# Patient Record
Sex: FEMALE | Race: BLACK OR AFRICAN AMERICAN | Employment: OTHER | URBAN - METROPOLITAN AREA
[De-identification: names, ages, dates, MRNs, and addresses within clinical notes are randomized per-mention and may not be internally consistent; named-entity substitution may affect disease eponyms.]

---

## 2019-01-07 ENCOUNTER — HOSPITAL ENCOUNTER (OUTPATIENT)
Dept: WOUND CARE | Age: 84
Discharge: HOME | End: 2019-01-07
Payer: MEDICARE

## 2019-01-07 VITALS — RESPIRATION RATE: 18 BRPM | OXYGEN SATURATION: 99 % | HEART RATE: 78 BPM | TEMPERATURE: 98.2 F

## 2019-01-07 PROCEDURE — 99201 HC NEW PT LEVEL I: CPT

## 2019-01-07 NOTE — WOUND CARE
01/07/19 1447 Wound Leg Lower Right;Lateral;Superior Date First Assessed/Time First Assessed: 01/07/19 1340   POA: Yes  Wound Type: Venous  Location: Leg Lower  Orientation: Right;Lateral;Superior DRESSING STATUS Other (comment) (home dressing) DRESSING TYPE Gauze wrap (sophie) Non-Pressure Injury Full thickness (subcut/muscle) Wound Length (cm) 2 cm Wound Width (cm) 2 cm Wound Depth (cm) 0.2 Wound Surface area (cm^2) 4 cm^2 Condition of Base Eschar;Slough Condition of Edges Open Tissue Type Percent Black 50 % Tissue Type Percent Yellow 50 Drainage Amount  Moderate Wound Odor Mild Periwound Skin Condition Erythema, blanchable Cleansing and Cleansing Agents  (foam wash, vashe) Dressing Type Applied (mesalt,mepitel one,4x4,sophie,softroll,coban) Procedure Tolerated Well Wound Leg Lower Lateral;Right; Lower Date First Assessed/Time First Assessed: 01/07/19 1000   POA: Yes  Wound Type: Venous  Location: Leg Lower  Orientation: Lateral;Right; Lower DRESSING STATUS Other (comment) (home dressing) DRESSING TYPE Gauze wrap (sophie) Non-Pressure Injury Full thickness (subcut/muscle) Wound Length (cm) 4.5 cm Wound Width (cm) 4.5 cm Wound Depth (cm) 0.2 Wound Surface area (cm^2) 20.25 cm^2 Condition of Base Eschar;Slough Condition of Edges Open Tissue Type Percent Black 50 % Tissue Type Percent Yellow 50 Drainage Amount  Moderate Drainage Color Serosanguinous Wound Odor Mild Periwound Skin Condition Erythema, blanchable Cleansing and Cleansing Agents  (foam wash,vashe) Dressing Type Applied (mesalt,mepitel one, 4x4,sophie,softroll,coban) Procedure Tolerated Well Wound Leg Lower Right; Anterior; Lower Date First Assessed/Time First Assessed: 01/07/19 1000   POA: Yes  Wound Type: Venous  Location: Leg Lower  Orientation: Right; Anterior; Lower DRESSING STATUS Other (comment) (home dressing) DRESSING TYPE Gauze wrap (sophie) Non-Pressure Injury Full thickness (subcut/muscle) Wound Length (cm) 2 cm Wound Width (cm) 2 cm Wound Depth (cm) 0.2 Wound Surface area (cm^2) 4 cm^2 Condition of Base Eschar;Slough Condition of Edges Open Tissue Type Percent Black 50 % Tissue Type Percent Yellow 50 Drainage Amount  Small Drainage Color Serosanguinous Wound Odor Mild Periwound Skin Condition Erythema, blanchable Cleansing and Cleansing Agents  (foam wash,vashe) Dressing Type Applied (mesalt,mepitel one,4x4,sophie,softroll,coban) Procedure Tolerated Well

## 2019-01-15 ENCOUNTER — HOSPITAL ENCOUNTER (OUTPATIENT)
Dept: VASCULAR SURGERY | Age: 84
Discharge: HOME | End: 2019-01-15
Payer: MEDICARE

## 2019-01-15 ENCOUNTER — HOSPITAL ENCOUNTER (OUTPATIENT)
Dept: WOUND CARE | Age: 84
Discharge: HOME | End: 2019-01-15
Payer: MEDICARE

## 2019-01-15 VITALS
OXYGEN SATURATION: 99 % | HEART RATE: 100 BPM | DIASTOLIC BLOOD PRESSURE: 109 MMHG | RESPIRATION RATE: 17 BRPM | SYSTOLIC BLOOD PRESSURE: 156 MMHG | TEMPERATURE: 97.6 F

## 2019-01-15 DIAGNOSIS — L97.812 ULCER OF RIGHT PRETIBIAL REGION, WITH FAT LAYER EXPOSED (HCC): ICD-10-CM

## 2019-01-15 LAB
LEFT ABI: 0.7
LEFT ANTERIOR TIBIAL: 138 MMHG
LEFT ARM BP: 198 MMHG
LEFT POSTERIOR TIBIAL: 122 MMHG
LEFT TBI: 0.36
LEFT TOE PRESSURE: 72 MMHG
RIGHT ABI: 1.05
RIGHT ANTERIOR TIBIAL: 208 MMHG
RIGHT ARM BP: 189 MMHG
RIGHT POSTERIOR TIBIAL: 208 MMHG
RIGHT TBI: 0.55
RIGHT TOE PRESSURE: 109 MMHG

## 2019-01-15 PROCEDURE — 99211 OFF/OP EST MAY X REQ PHY/QHP: CPT

## 2019-01-15 PROCEDURE — 93922 UPR/L XTREMITY ART 2 LEVELS: CPT

## 2019-01-15 NOTE — WOUND CARE
01/15/19 1100 Wound Leg Lower Right;Lateral;Superior Date First Assessed/Time First Assessed: 01/07/19 1340   POA: Yes  Wound Type: Venous  Location: Leg Lower  Orientation: Right;Lateral;Superior DRESSING STATUS Other (comment) 
(slipped down) DRESSING TYPE Other (Comment) (coban, soft roll, gauze, mepitel, mesalt) Non-Pressure Injury Full thickness (subcut/muscle) Wound Length (cm) 2 cm Wound Width (cm) 2 cm Wound Depth (cm) 0.2 Wound Surface area (cm^2) 4 cm^2 Change in Wound Size % 0 Condition of Base Slough;Granulation Condition of Edges Open Tissue Type Percent Red 25 Tissue Type Percent Yellow 75 Drainage Amount  Moderate Drainage Color Serosanguinous Wound Odor None Periwound Skin Condition Erythema, blanchable Cleansing and Cleansing Agents  Other (comment) (vashe) Dressing Type Applied Other (Comment) (silcone border, mesalt, tubigrip F) Procedure Tolerated Well Wound Leg Lower Lateral;Right; Lower Date First Assessed/Time First Assessed: 01/07/19 1000   POA: Yes  Wound Type: Venous  Location: Leg Lower  Orientation: Lateral;Right; Lower DRESSING STATUS Other (comment) 
(slipped down) DRESSING TYPE Other (Comment) (coban, soft roll, gauze, mepitel one, mesalt) Non-Pressure Injury Full thickness (subcut/muscle) Wound Length (cm) 4.5 cm Wound Width (cm) 5 cm Wound Depth (cm) 0.2 Wound Surface area (cm^2) 22.5 cm^2 Change in Wound Size % -11.11 Condition of Base Slough;Granulation Condition of Edges Open Tissue Type Percent Red 25 Tissue Type Percent Yellow 75 Drainage Amount  Moderate Drainage Color Serosanguinous Wound Odor None Periwound Skin Condition Erythema, blanchable Cleansing and Cleansing Agents  Other (comment) (vashe) Dressing Type Applied Other (Comment) (silcone border, mesalt, tubigrip f ) Procedure Tolerated Well Wound Leg Lower Right; Anterior; Lower Date First Assessed/Time First Assessed: 01/07/19 1000   POA: Yes  Wound Type: Venous  Location: Leg Lower  Orientation: Right; Anterior; Lower DRESSING STATUS Other (comment) 
(slipped down) DRESSING TYPE Other (Comment) (coban, soft roll, gauze, mepitel one, mesalt ) Non-Pressure Injury Full thickness (subcut/muscle) Wound Length (cm) 2.5 cm Wound Width (cm) 2.5 cm Wound Depth (cm) 0.2 Wound Surface area (cm^2) 6.25 cm^2 Change in Wound Size % -56.25 Condition of Base Slough;Granulation Condition of Edges Open Tissue Type Percent Red 25 Tissue Type Percent Yellow 75 Drainage Amount  Moderate Drainage Color Serosanguinous Wound Odor None Periwound Skin Condition Erythema, blanchable Cleansing and Cleansing Agents  Other (comment) (vashe) Dressing Type Applied Other (Comment) (silcone border, mesalt, tubigrip F) Procedure Tolerated Well Superior  
 
lateral 
 
Anterior

## 2019-01-15 NOTE — DISCHARGE INSTRUCTIONS
May shower, keep dressing dry and covered   Leave dressings in place until next visit    PLEASE CONTACT OFFICE AS SOON AS POSSIBLE IF UNABLE TO [address]. Inspect your wounds, looking for signs of infection which may include the following:  Increase in redness  Red \"streaks\" from wound  Increase in swelling  Fever  Unusual odor  Change in the amount of wound drainage. Should you experience any significant changes in your wound(s) or have any questions regarding your home care instructions please contact the wound center or your home health company. If after regular business hours, please call your family doctor or local emergency room. Edema Control:   Elevate legs as much as possible. Avoid standing in one position for more than 10 minutes. Avoid setting with legs down. Do not cross legs while sitting. Off-Loading:     Frequent position changes. Do not cross legs while sitting. Shift weight every 20 minutes or more when sitting for prolonged periods of time.

## 2019-01-18 ENCOUNTER — HOSPITAL ENCOUNTER (OUTPATIENT)
Dept: WOUND CARE | Age: 84
Discharge: HOME | End: 2019-01-18
Payer: MEDICARE

## 2019-01-18 VITALS
TEMPERATURE: 97.7 F | RESPIRATION RATE: 18 BRPM | DIASTOLIC BLOOD PRESSURE: 94 MMHG | OXYGEN SATURATION: 100 % | SYSTOLIC BLOOD PRESSURE: 147 MMHG | HEART RATE: 103 BPM

## 2019-01-18 PROCEDURE — 97597 DBRDMT OPN WND 1ST 20 CM/<: CPT

## 2019-01-18 PROCEDURE — 29581 APPL MULTLAYER CMPRN SYS LEG: CPT

## 2019-01-18 NOTE — DISCHARGE INSTRUCTIONS
Patient to return for wound care in: Wednesday    PLEASE CONTACT OFFICE AS SOON AS POSSIBLE IF UNABLE TO MAKE THIS APPOINTMENT. Inspect your wounds, looking for signs of infection which may include the following:  Increase in redness  Red \"streaks\" from wound  Increase in swelling  Fever  Unusual odor  Change in the amount of wound drainage. Should you experience any significant changes in your wound(s) or have any questions regarding your home care instructions please contact the wound center or your home health company. If after regular business hours, please call your family doctor or local emergency room. Edema Control:   Elevate legs as much as possible. Avoid standing in one position for more than 10 minutes. Avoid setting with legs down. Do not cross legs while sitting. Off-Loading:     Frequent position changes. Do not cross legs while sitting. Shift weight every 20 minutes or more when sitting for prolonged periods of time.

## 2019-01-18 NOTE — WOUND CARE
01/18/19 1618 Wound Leg Lower Right;Lateral;Superior Date First Assessed/Time First Assessed: 01/07/19 1340   POA: Yes  Wound Type: Venous  Location: Leg Lower  Orientation: Right;Lateral;Superior DRESSING STATUS Intact DRESSING TYPE Tubular dressing 
(tubigrip, allevyn, mesalt ) Non-Pressure Injury Full thickness (subcut/muscle) Wound Length (cm) 2 cm Wound Width (cm) 2 cm Wound Depth (cm) 0.2 Wound Surface area (cm^2) 4 cm^2 Change in Wound Size % 0 Condition of Base Slough;Granulation Condition of Edges Open Tissue Type Percent Red 25 Tissue Type Percent Yellow 75 Drainage Amount  Moderate Drainage Color Serosanguinous Wound Odor None Periwound Skin Condition Erythema, blanchable Cleansing and Cleansing Agents  Other (comment) (vashe) Dressing Type Applied Other (Comment) (profore w/o coban, mextra, sophie, mep 1, mesalt) Procedure Tolerated Well Wound Leg Lower Lateral;Right; Lower Date First Assessed/Time First Assessed: 01/07/19 1000   POA: Yes  Wound Type: Venous  Location: Leg Lower  Orientation: Lateral;Right; Lower DRESSING STATUS Clean, dry, and intact DRESSING TYPE Tubular dressing 
(Trent Lei) Non-Pressure Injury Full thickness (subcut/muscle) Wound Length (cm) 4.5 cm Wound Width (cm) 4.5 cm Wound Depth (cm) 0.2 Wound Surface area (cm^2) 20.25 cm^2 Change in Wound Size % 0 Condition of Base Slough;Granulation Condition of Edges Open Tissue Type Percent Red 25 Tissue Type Percent Yellow 75 Drainage Amount  Moderate Drainage Color Serosanguinous Wound Odor None Periwound Skin Condition Erythema, blanchable Cleansing and Cleansing Agents  Other (comment) (vashe) Dressing Type Applied Other (Comment) (profore w/o coban, mextra, sophie, mep 1, mesalt) Procedure Tolerated Well Wound Leg Lower Right; Anterior; Lower Date First Assessed/Time First Assessed: 01/07/19 1000   POA: Yes  Wound Type: Venous  Location: Leg Lower  Orientation: Right; Anterior; Lower DRESSING STATUS Clean, dry, and intact DRESSING TYPE Tubular dressing 
(Cheryle Richer) Non-Pressure Injury Full thickness (subcut/muscle) Wound Length (cm) 2.5 cm Wound Width (cm) 2.5 cm Wound Depth (cm) 0.2 Wound Surface area (cm^2) 6.25 cm^2 Change in Wound Size % -56.25 Condition of Base Slough;Granulation Condition of Edges Open Tissue Type Percent Red 25 Tissue Type Percent Yellow 75 Drainage Amount  Moderate Drainage Color Serosanguinous Wound Odor None Periwound Skin Condition Erythema, blanchable Cleansing and Cleansing Agents  Other (comment) (vashe) Dressing Type Applied Other (Comment) (profore w/o coban, rafiq, sophie, mep 1, mesalt) Procedure Tolerated Well

## 2019-01-23 ENCOUNTER — HOSPITAL ENCOUNTER (OUTPATIENT)
Dept: WOUND CARE | Age: 84
Discharge: HOME | End: 2019-01-23
Payer: MEDICARE

## 2019-01-23 VITALS
RESPIRATION RATE: 18 BRPM | HEART RATE: 70 BPM | TEMPERATURE: 98.1 F | OXYGEN SATURATION: 98 % | DIASTOLIC BLOOD PRESSURE: 71 MMHG | SYSTOLIC BLOOD PRESSURE: 144 MMHG

## 2019-01-23 PROCEDURE — 29581 APPL MULTLAYER CMPRN SYS LEG: CPT

## 2019-01-25 NOTE — WOUND CARE
01/23/19 1416 Wound Leg Lower Right;Lateral;Superior Date First Assessed/Time First Assessed: 01/07/19 1340   POA: Yes  Wound Type: Venous  Location: Leg Lower  Orientation: Right;Lateral;Superior DRESSING STATUS Clean, dry, and intact DRESSING TYPE (profore no coban, ace,mesalt,4x4,mextra,sophie) Non-Pressure Injury Full thickness (subcut/muscle) Wound Length (cm) 2 cm Wound Width (cm) 2 cm Wound Depth (cm) 0.2 Wound Surface area (cm^2) 4 cm^2 Change in Wound Size % 0 Condition of Base Slough;Granulation Condition of Edges Open Tissue Type Percent Red 25 Tissue Type Percent Yellow 75 Drainage Amount  Moderate Drainage Color Serosanguinous Wound Odor None Periwound Skin Condition Edematous; Erythema, blanchable Cleansing and Cleansing Agents  (vashe) Dressing Type Applied (profore no coban,ace,mextra,sophie,mesalt) Procedure Tolerated Well Wound Leg Lower Lateral;Right; Lower Date First Assessed/Time First Assessed: 01/07/19 1000   POA: Yes  Wound Type: Venous  Location: Leg Lower  Orientation: Lateral;Right; Lower DRESSING STATUS Clean, dry, and intact DRESSING TYPE (profore no coban,sophie,mextra,mesalt) Non-Pressure Injury Full thickness (subcut/muscle) Wound Length (cm) 4.4 cm Wound Width (cm) 4.5 cm Wound Depth (cm) 0.2 Wound Surface area (cm^2) 19.8 cm^2 Change in Wound Size % 2.22 Condition of Base Slough;Granulation Condition of Edges Open Tissue Type Percent Red 25 Tissue Type Percent Yellow 75 Drainage Amount  Moderate Wound Odor None Periwound Skin Condition Erythema, blanchable Cleansing and Cleansing Agents  (foam wash, vashe) Dressing Type Applied (profore no coban,ace,sophie,mextra,4x4,mesalt) Wound Leg Lower Right; Anterior; Lower Date First Assessed/Time First Assessed: 01/07/19 1000   POA: Yes  Wound Type: Venous  Location: Leg Lower  Orientation: Right; Anterior; Lower DRESSING STATUS Clean, dry, and intact Non-Pressure Injury Full thickness (subcut/muscle) Wound Length (cm) 2.5 cm Wound Width (cm) 2.5 cm Wound Depth (cm) 0.2 Wound Surface area (cm^2) 6.25 cm^2 Change in Wound Size % -56.25 Condition of Base Slough;Granulation Condition of Edges Open Tissue Type Percent Red 25 Tissue Type Percent Yellow 75 Drainage Amount  Moderate Drainage Color Serosanguinous Wound Odor None Periwound Skin Condition Erythema, blanchable Cleansing and Cleansing Agents  (profore no coban,ace,sophie,mextra,4x4,mesalt) Dressing Type Applied (vashe,foam wash)

## 2019-01-29 ENCOUNTER — HOSPITAL ENCOUNTER (OUTPATIENT)
Dept: WOUND CARE | Age: 84
Discharge: HOME | End: 2019-01-29
Payer: MEDICARE

## 2019-01-29 VITALS
TEMPERATURE: 97.4 F | RESPIRATION RATE: 18 BRPM | DIASTOLIC BLOOD PRESSURE: 94 MMHG | OXYGEN SATURATION: 98 % | HEART RATE: 70 BPM | SYSTOLIC BLOOD PRESSURE: 167 MMHG

## 2019-01-29 PROCEDURE — 29581 APPL MULTLAYER CMPRN SYS LEG: CPT

## 2019-01-30 NOTE — WOUND CARE
01/29/19 1203 Wound Leg Lower Right;Lateral;Superior Date First Assessed/Time First Assessed: 01/07/19 1340   POA: Yes  Wound Type: Venous  Location: Leg Lower  Orientation: Right;Lateral;Superior Dressing Status  Clean, dry, and intact Dressing Type  (ace wraps, multilayer wrap) Non-Pressure Injury Full thickness (subcut/muscle) Wound Length (cm) 7.3 cm Wound Width (cm) 4 cm Wound Depth (cm) 0.3 Wound Surface area (cm^2) 29.2 cm^2 Change in Wound Size % -630 Condition of Base Granulation;Slough Condition of Edges Open Tissue Type Percent Red 40 Tissue Type Percent Yellow 60 Drainage Amount  Moderate Drainage Color Serosanguinous Wound Odor None Periwound Skin Condition Edematous; Erythema, blanchable Cleansing and Cleansing Agents  (foam wash, vashe and barrier wipes) Dressing Type Applied (mesalt, gauze, mextra, profore wrap, no coban) Procedure Tolerated Well Wound Leg Lower Lateral;Right; Lower Date First Assessed/Time First Assessed: 01/07/19 1000   POA: Yes  Wound Type: Venous  Location: Leg Lower  Orientation: Lateral;Right; Lower Dressing Status  Clean, dry, and intact Dressing Type  (ace wraps, multilayer wrap) Splint Type/Material (wounds have merged) Wound Leg Lower Right; Anterior; Lower Date First Assessed/Time First Assessed: 01/07/19 1000   POA: Yes  Wound Type: Venous  Location: Leg Lower  Orientation: Right; Anterior; Lower Dressing Status  Clean, dry, and intact Non-Pressure Injury Full thickness (subcut/muscle) Wound Length (cm) 2.3 cm Wound Width (cm) 2 cm Wound Depth (cm) 0.2 Wound Surface area (cm^2) 4.6 cm^2 Change in Wound Size % -15 Condition of Base Granulation;Slough Condition of Edges Open Tissue Type Percent Red 30 Tissue Type Percent Yellow 70 Drainage Amount  Moderate Drainage Color Serosanguinous Wound Odor None Periwound Skin Condition Erythema, blanchable Cleansing and Cleansing Agents  (foam wash, vashe and barrier wipes) Dressing Type Applied (mesalt, gauze, mextra, profore wrap, no coban) Procedure Tolerated Well

## 2019-02-01 ENCOUNTER — HOSPITAL ENCOUNTER (OUTPATIENT)
Dept: WOUND CARE | Age: 84
Discharge: HOME | End: 2019-02-01
Attending: PODIATRIST
Payer: MEDICARE

## 2019-02-01 VITALS
DIASTOLIC BLOOD PRESSURE: 121 MMHG | RESPIRATION RATE: 18 BRPM | SYSTOLIC BLOOD PRESSURE: 193 MMHG | OXYGEN SATURATION: 100 % | TEMPERATURE: 98 F | HEART RATE: 110 BPM

## 2019-02-01 PROBLEM — L97.812 ULCER OF RIGHT PRETIBIAL REGION, WITH FAT LAYER EXPOSED (HCC): Status: ACTIVE | Noted: 2019-02-01

## 2019-02-01 PROCEDURE — 15271 SKIN SUB GRAFT TRNK/ARM/LEG: CPT

## 2019-02-01 PROCEDURE — 15272 SKIN SUB GRAFT T/A/L ADD-ON: CPT

## 2019-02-01 NOTE — WOUND CARE
02/01/19 1553 Wound Leg Lower Right;Lateral;Superior Date First Assessed/Time First Assessed: 01/07/19 1340   POA: Yes  Wound Type: Venous  Location: Leg Lower  Orientation: Right;Lateral;Superior Dressing Status  Intact; Old drainage Dressing Type  4 x 4;Elastic bandage Non-Pressure Injury Full thickness (subcut/muscle) Wound Length (cm) 7.3 cm Wound Width (cm) 4 cm Wound Depth (cm) 0.3 Wound Surface area (cm^2) 29.2 cm^2 Change in Wound Size % -630 Condition of Base Granulation;Slough Condition of Edges Open Tissue Type Percent Red 40 Tissue Type Percent Yellow 60 Drainage Amount  Moderate Drainage Color Serosanguinous Wound Odor None Periwound Skin Condition Erythema, blanchable Cleansing and Cleansing Agents  Dermal wound cleanser Dressing Type Applied Compression dressing; Other (Comment) (Mesalt, exudry, soft roll, Profore compression wrap.) Wound Procedure Type Skin Substitue Consent Obtained  Yes Procedure Instrument  Blade Debridement Procedure Performed by Sherri Francisco) Post-Procedure Length (cm) 7.3 cm Post-Procedure Width (cm) 4 cm Post-Procedure Depth (cm) 0.3 cm Post-Procedure Volume (cm^3) 8.76 cm^3 Post-Procedure Surface Area (cm^2) 29.2 cm^2 Skin Substitute Applied  Theraskin Skin Sub Application  3/10 Post Procedure Procedure Pain Scale Numeric 1/10 Assisted Physcian in procedure  Yes Procedure Tolerated Well Wound Leg Lower Lateral;Right; Lower Date First Assessed/Time First Assessed: 01/07/19 1000   POA: Yes  Wound Type: Venous  Location: Leg Lower  Orientation: Lateral;Right; Lower Dressing Status  Intact; Old drainage Dressing Type  (ace wraps multilayer wraps) Wound Leg Lower Right; Anterior; Lower Date First Assessed/Time First Assessed: 01/07/19 1000   POA: Yes  Wound Type: Venous  Location: Leg Lower  Orientation: Right; Anterior; Lower Dressing Status  Intact; Old drainage Non-Pressure Injury Full thickness (subcut/muscle) Wound Length (cm) 2.3 cm Wound Width (cm) 2 cm Wound Depth (cm) 0.2 Wound Surface area (cm^2) 4.6 cm^2 Change in Wound Size % -15 Condition of Base Granulation;Slough Condition of Edges Open Tissue Type Percent Red 30 Tissue Type Percent Yellow 70 Drainage Amount  Small Drainage Color Sanguinous Wound Odor None Periwound Skin Condition Erythema, blanchable Cleansing and Cleansing Agents  Dermal wound cleanser Dressing Type Applied Pressure dressing Theraskin:  Applied to right lower leg Lot# [id number] Exp: 2022-04-03 Normal Saline 0.9% Lot# [id number]  EXP: [id number].

## 2019-02-01 NOTE — PROGRESS NOTES
[address] [address] Subjective: Chief Complaint: Maylin Bolden is a 80 y.o. female who returns to [facility] today, for follow up treatment of open wound on the right leg. It started over a month ago and has continued to get bigger, despite proper wound care. She states that she would like to try a skin graft today. Past Medical History:  
Diagnosis Date  Menopause Past Surgical History:  
Procedure Laterality Date  HX BREAST BIOPSY Left br. Benign  HX HYSTERECTOMY Age 52 Current Medications: 
Prior to Admission medications Not on File Allergies Allergen Reactions  Petrolatum,White Rash Family History Problem Relation Age of Onset  Breast Cancer Maternal Aunt  Breast Cancer Maternal Grandmother Social History Socioeconomic History  Marital status:  Spouse name: Not on file  Number of children: Not on file  Years of education: Not on file  Highest education level: Not on file Social Needs  Financial resource strain: Not on file  Food insecurity - worry: Not on file  Food insecurity - inability: Not on file  Transportation needs - medical: Not on file  Transportation needs - non-medical: Not on file Occupational History  Not on file Tobacco Use  Smoking status: Not on file Substance and Sexual Activity  Alcohol use: Not on file  Drug use: Not on file  Sexual activity: Not on file Other Topics Concern [address] Service Not Asked  Blood Transfusions Not Asked  Caffeine Concern Not Asked  Occupational Exposure Not Asked Nancy Bui Hazards Not Asked  Sleep Concern Not Asked  Stress Concern Not Asked  Weight Concern Not Asked  Special Diet Not Asked  Back Care Not Asked  Exercise Not Asked  Bike Helmet Not Asked  Seat Belt Not Asked  Self-Exams Not Asked Social History Narrative  Not on file Objective:  
 
Physical Assessment: 
 
Vitals:  
 02/01/19 1545 BP: (!) 193/121 Pulse: (!) 110 Resp: 18 Temp: 98 °F (36.7 °C) SpO2: 100% Lower Extremity Exam: 
 
Vascular Exam: 
Dorsalis Pedis Pulse: 1/4  Bilateral Lower Extremities Posterior Tibial Pulse: 1/4 Bilateral Lower Extremities Capillary Refill is < 3 seconds Bilateral Lower Extremities Temperature of extremity from proximal to distal is warm and perfused Bilateral Lower Extremities Recent LYNNETTE results: Right=1.05       Left=0.7 on 1- Integumentary Exam: 
Skin Texture is WNL Bilateral Lower Extremities Pedal Hair is present Bilateral Lower Extremities Examination of lower extremity reveals open ulcers right leg Etiology: Started as a blister Wound Description: Wound Type: Venous stasis Indication:    Chronic >30days Status:   no change Measurements: 
  Wound Length: 7.3 cm, 2.3 cm Wound Width :4 cm, 2 cm Wound Depth :0.3, 0.2 Tissue Type: Ulcer bed: Red granular base Periwound: Surrounding erythema with intact dermal layer Exudate: No drainage noted Epithelial: with out necrosis Granulation: with out necrosis Subcutaneous: with out necrosis Slough: no 
Eschar: no 
Tendon: exposed no -with out necrosis Fascia: exposed no -with out necrosis Muscle: exposed no -with out necrosis Bone: exposed no -with out necrosis Drainage:  None Debridement:   --not required Infection: no 
Edema: no  
 
Compression: no  
Offloading:  No 
Nicotine/Tobacco Use:  No  
 
Nutrition: 
Status malnourished  WNL Laboratory Results: 
@ Basic Metabolic Profile@ Lab Results Component Value Date  09/15/2011 CO2 32 09/15/2011 BUN 17 09/15/2011 Data Review: No results found for this or any previous visit (from the past 24 hour(s)). Assessment:  
 
80 y.o. female with Patient Active Problem List  
Diagnosis Code  Ulcer of right pretibial region, with fat layer exposed ([facility] [address] H04.284 Open venous stasis ulcers right leg Venous insufficiency bilateral lower extremities. Recommendation:  
 
Plan:  
 
Plan/Procedure: 
Needs : 
 
Initial application: Bioengineered skin substitute product Theraskin was applied today on the Right leg. This is a chronic venous ulcer, that has not responded to standard wound treatment. The wound has not changed or has increased in size/depth, and has no indication of improvement towards healing. The following interventions have failed: More than 4 weeks of proper wound care consisting of debridement, compression, specialized wound care products. The patient has adequate circulation to support tissue growth and wound healing as evidence by Recent LYNNETTE results: Right=1.05       Left=0.7 on 1-. Patient is also compliant with the following areas: 
 
Smoking: patient has never smoked Edema control with compression, offloading appropriately, and the wound is free from infection or underlying osteomyelitis. The wound has been receiving appropriate debridement prior to application of bioengineered skin substitute. Time Taken out: yes Location: Right leg Wound assessment: as noted above Application area: 39 -sq cm Product applied:  39 -sq cm of total product applied Wasted: 0 -sq cm of any unused product Lot #: refer to nursing input information Order #:  refer to nursing input information Expiration date:  refer to nursing input information Was the product fenestrated?: yes The product was secured with steri-strips, sutures, and covered with a dressing. Pre procedural pain was 4/10 rating, and post procedural pain was 5/10 rating). Patient responded well to treatment. Good local wound care Nutrition optimization In wound care clinic today: 
Cleanse wound with commercial wound cleanser Apply the following topically to wound bed: Mepatel-1 Apply the following to juancho-wound: NA Apply the following dressings: Homero  Profore compressive dressing For Home Care/Self Care: 
Cleanse wound with NS or soap and water or commercial wound cleanser Keep dressing dry and intact when bathing Apply the same dressings as above. Leave dressings in place until next visit Patient to return for wound care in: 7  Days Follow up with Nurse visit as recommended. PLEASE CONTACT OFFICE AS SOON AS POSSIBLE IF UNABLE TO MAKE THIS APPOINTMENT. Inspect your wounds, looking for signs of infection which may include the following:  Increase in redness  Red \"streaks\" from wound  Increase in swelling Fever  Unusual odor Change in the amount of wound drainage. Should you experience any significant changes in your wound(s) or have any questions regarding your home care instructions please contact the wound center or your home health company. If after regular business hours, please call your family doctor or local emergency room. Edema Control:   Elevate legs as much as possible. Avoid standing in one position for more than 10 minutes. Avoid setting with legs down. Do not cross legs while sitting. Off-Loading:     Frequent position changes. Do not cross legs while sitting. Shift weight every 20 minutes or more when sitting for prolonged periods of time.  
 
 
Signed By: Cleveland Bolden DPM 
  
 February 1, 2019, 6:07 PM

## 2019-02-05 ENCOUNTER — HOSPITAL ENCOUNTER (OUTPATIENT)
Dept: WOUND CARE | Age: 84
Discharge: HOME | End: 2019-02-05
Attending: PODIATRIST
Payer: MEDICARE

## 2019-02-05 VITALS
TEMPERATURE: 97.6 F | SYSTOLIC BLOOD PRESSURE: 183 MMHG | OXYGEN SATURATION: 100 % | DIASTOLIC BLOOD PRESSURE: 116 MMHG | HEART RATE: 97 BPM | RESPIRATION RATE: 18 BRPM

## 2019-02-05 PROCEDURE — 29581 APPL MULTLAYER CMPRN SYS LEG: CPT

## 2019-02-05 NOTE — WOUND CARE
02/05/19 1404 Wound Leg Lower Right;Lateral;Superior Date First Assessed/Time First Assessed: 01/07/19 1340   POA: Yes  Wound Type: Venous  Location: Leg Lower  Orientation: Right;Lateral;Superior Dressing Status  Clean, dry, and intact Dressing Type  (sophie, mextra, profore wrap(no coban) ace wrap) Non-Pressure Injury Full thickness (subcut/muscle) Wound Length (cm) 7.3 cm Wound Width (cm) 4 cm Wound Depth (cm) 0.3 Wound Surface area (cm^2) 29.2 cm^2 Change in Wound Size % -630 Condition of Base (skin graft intact) Condition of Edges Open Tissue Type Percent Red (skin graft intact) Drainage Amount  Small Drainage Color Serous Wound Odor None Periwound Skin Condition Intact Cleansing and Cleansing Agents  (foam wash, saline and barrier wipes) Dressing Type Applied (mextra, sophie, profore wrap (no coban) ace wrap) Procedure Tolerated Well Wound Leg Lower Lateral;Right; Lower Date First Assessed/Time First Assessed: 01/07/19 1000   POA: Yes  Wound Type: Venous  Location: Leg Lower  Orientation: Lateral;Right; Lower Dressing Status  Clean, dry, and intact Dressing Type  (sophie, mextra, profore wrap(no coban) ace wrap) Wound Leg Lower Right; Anterior; Lower Date First Assessed/Time First Assessed: 01/07/19 1000   POA: Yes  Wound Type: Venous  Location: Leg Lower  Orientation: Right; Anterior; Lower Dressing Status  Clean, dry, and intact Dressing Type  (sophie, mextra, profore wrap(no coban) ace wrap) Non-Pressure Injury Full thickness (subcut/muscle) Wound Length (cm) 2.3 cm Wound Width (cm) 2 cm Wound Depth (cm) 0.2 Wound Surface area (cm^2) 4.6 cm^2 Change in Wound Size % -15 Condition of Base (skin graft) Condition of Edges Open Tissue Type Percent Red (skin graft) Drainage Amount  Small Drainage Color Serous Wound Odor None Periwound Skin Condition Intact Cleansing and Cleansing Agents  (foam wash, saline and barrier wipes) Dressing Type Applied (mextra. sophie, profore wrap ( no coban) ace wrap) Procedure Tolerated Well

## 2019-02-11 ENCOUNTER — HOSPITAL ENCOUNTER (OUTPATIENT)
Dept: WOUND CARE | Age: 84
Discharge: HOME | End: 2019-02-11
Attending: PODIATRIST
Payer: MEDICARE

## 2019-02-11 VITALS
OXYGEN SATURATION: 100 % | SYSTOLIC BLOOD PRESSURE: 175 MMHG | TEMPERATURE: 97.5 F | DIASTOLIC BLOOD PRESSURE: 112 MMHG | HEART RATE: 93 BPM | RESPIRATION RATE: 17 BRPM

## 2019-02-11 PROCEDURE — 29581 APPL MULTLAYER CMPRN SYS LEG: CPT

## 2019-02-11 NOTE — DISCHARGE INSTRUCTIONS
For Home Care/Self Care  Keep dressing dry and intact when bathing    Leave dressings in place until next visit    [facility]. Inspect your wounds, looking for signs of infection which may include the following:  Increase in redness  Red \"streaks\" from wound  Increase in swelling  Fever  Unusual odor  Change in the amount of wound drainage. Should you experience any significant changes in your wound(s) or have any questions regarding your home care instructions please contact the wound center or your home health company. If after regular business hours, please call your family doctor or local emergency room. Edema Control:   Elevate legs as much as possible. Avoid standing in one position for more than 10 minutes. Avoid setting with legs down. Do not cross legs while sitting. Off-Loading:     Frequent position changes. Do not cross legs while sitting. Shift weight every 20 minutes or more when sitting for prolonged periods of time.

## 2019-02-11 NOTE — WOUND CARE
02/11/19 1134 Wound Leg Lower Right;Lateral;Superior Date First Assessed/Time First Assessed: 01/07/19 1340   POA: Yes  Wound Type: Venous  Location: Leg Lower  Orientation: Right;Lateral;Superior Dressing Status  Clean, dry, and intact Dressing Type  Absorptive; Compression dressing Non-Pressure Injury Full thickness (subcut/muscle) Wound Length (cm) (Graft in place) Condition of Base Pink;Slough Condition of Edges Open Tissue Type Yellow;Pink Drainage Amount  Moderate Drainage Color Serosanguinous; Tan  
Wound Odor Mild Periwound Skin Condition Intact; Other (comment) (dry) Cleansing and Cleansing Agents  Soap and water;Normal saline Dressing Type Applied Other (Comment) (pita Starks with ACE wrap ) Procedure Tolerated Well

## 2019-02-15 ENCOUNTER — HOSPITAL ENCOUNTER (OUTPATIENT)
Dept: WOUND CARE | Age: 84
Discharge: HOME | End: 2019-02-15
Attending: PODIATRIST
Payer: MEDICARE

## 2019-02-15 VITALS
TEMPERATURE: 97.5 F | OXYGEN SATURATION: 100 % | HEART RATE: 101 BPM | SYSTOLIC BLOOD PRESSURE: 187 MMHG | RESPIRATION RATE: 18 BRPM | DIASTOLIC BLOOD PRESSURE: 96 MMHG

## 2019-02-15 PROCEDURE — 29581 APPL MULTLAYER CMPRN SYS LEG: CPT

## 2019-02-15 NOTE — WOUND CARE
02/15/19 1332 Wound Leg Lower Right;Lateral;Superior Date First Assessed/Time First Assessed: 01/07/19 1340   POA: Yes  Wound Type: Venous  Location: Leg Lower  Orientation: Right;Lateral;Superior Dressing Status  Clean, dry, and intact Dressing Type  Absorptive Non-Pressure Injury Full thickness (subcut/muscle) Condition of Base Pink;Slough Condition of Edges Open Tissue Type Pink;Yellow Drainage Amount  Moderate Drainage Color Serosanguinous; Tan  
Wound Odor Mild Periwound Skin Condition Intact; Other (comment) Cleansing and Cleansing Agents  Soap and water Dressing Type Applied Pressure dressing; Other (Comment) 
(sorbion sachet, sophie, coban) Procedure Tolerated Well Wound Leg Lower Right; Anterior; Lower Date First Assessed/Time First Assessed: 01/07/19 1000   POA: Yes  Wound Type: Venous  Location: Leg Lower  Orientation: Right; Anterior; Lower Dressing Status  Clean, dry, and intact Non-Pressure Injury Full thickness (subcut/muscle) Wound Length (cm) 2.3 cm Wound Width (cm) 2 cm Wound Depth (cm) 0.2 Wound Surface area (cm^2) 4.6 cm^2 Change in Wound Size % -15 Condition of Edges Open Tissue Type Percent Red 30 Tissue Type Percent Yellow 70 Drainage Amount  Small Drainage Color Serous Wound Odor None Periwound Skin Condition Intact Cleansing and Cleansing Agents  Dermal wound cleanser Dressing Type Applied Pressure dressing Procedure Tolerated Well Wound Leg Lower Lateral;Right; Lower Date First Assessed/Time First Assessed: 01/07/19 1000   POA: Yes  Wound Type: Venous  Location: Leg Lower  Orientation: Lateral;Right; Lower Dressing Status  Clean, dry, and intact Dressing Type  (sorbion sachet)

## 2019-02-22 ENCOUNTER — HOSPITAL ENCOUNTER (OUTPATIENT)
Dept: WOUND CARE | Age: 84
Discharge: HOME | End: 2019-02-22
Attending: PODIATRIST
Payer: MEDICARE

## 2019-02-22 VITALS
SYSTOLIC BLOOD PRESSURE: 187 MMHG | TEMPERATURE: 98 F | OXYGEN SATURATION: 100 % | HEART RATE: 93 BPM | DIASTOLIC BLOOD PRESSURE: 112 MMHG | RESPIRATION RATE: 19 BRPM | BODY MASS INDEX: 25.18 KG/M2 | WEIGHT: 170 LBS | HEIGHT: 69 IN

## 2019-02-22 PROCEDURE — 29581 APPL MULTLAYER CMPRN SYS LEG: CPT

## 2019-02-22 NOTE — WOUND CARE
02/22/19 1053 Wound Leg Lower Right;Lateral;Superior Date First Assessed/Time First Assessed: 01/07/19 1340   POA: Yes  Wound Type: Venous  Location: Leg Lower  Orientation: Right;Lateral;Superior Dressing Status  Intact Dressing Type  Absorptive Non-Pressure Injury Full thickness (subcut/muscle) Condition of Base Pink;Slough Condition of Edges Open Tissue Type Pink;Red Drainage Amount  Moderate Drainage Color Serosanguinous; Tan  
Wound Odor Mild Periwound Skin Condition Intact; Other (comment) Cleansing and Cleansing Agents  Soap and water Dressing Type Applied Pressure dressing 
(russ, sorbion sachet, profore lite, sophie) Wound Leg Lower Lateral;Right; Lower Date First Assessed/Time First Assessed: 01/07/19 1000   POA: Yes  Wound Type: Venous  Location: Leg Lower  Orientation: Lateral;Right; Lower Dressing Status  Intact; Old drainage Dressing Type  Absorptive; Other (Comment) (ace wraps) Wound Leg Lower Right; Anterior; Lower Date First Assessed/Time First Assessed: 01/07/19 1000   POA: Yes  Wound Type: Venous  Location: Leg Lower  Orientation: Right; Anterior; Lower Dressing Status  Intact; Old drainage 
(russ, profore compression wrap lite) Non-Pressure Injury Full thickness (subcut/muscle) Wound Length (cm) 2.3 cm Wound Width (cm) 2 cm Wound Depth (cm) 0.2 Wound Surface area (cm^2) 4.6 cm^2 Change in Wound Size % -15 Condition of Edges Open Tissue Type Percent Red 30 Tissue Type Percent Yellow 70 Drainage Amount  Small Drainage Color Serous Wound Odor None Periwound Skin Condition Intact Cleansing and Cleansing Agents  Dermal wound cleanser Dressing Type Applied Pressure dressing Procedure Tolerated Well  
 
 
 02/22/19 1053 Wound Leg Lower Right;Lateral;Superior Date First Assessed/Time First Assessed: 01/07/19 1340   POA: Yes  Wound Type: Venous  Location: Leg Lower  Orientation: Right;Lateral;Superior Dressing Status  Intact Dressing Type  Absorptive Non-Pressure Injury Full thickness (subcut/muscle) Condition of Base Pink;Slough Condition of Edges Open Tissue Type Pink;Red Drainage Amount  Moderate Drainage Color Serosanguinous; Tan  
Wound Odor Mild Periwound Skin Condition Intact; Other (comment) Cleansing and Cleansing Agents  Soap and water Dressing Type Applied Pressure dressing 
(russ, sorbion sachet, profore lite, sophie) Wound Leg Lower Lateral;Right; Lower Date First Assessed/Time First Assessed: 01/07/19 1000   POA: Yes  Wound Type: Venous  Location: Leg Lower  Orientation: Lateral;Right; Lower Dressing Status  Intact; Old drainage Dressing Type  Absorptive; Other (Comment) (ace wraps) Wound Leg Lower Right; Anterior; Lower Date First Assessed/Time First Assessed: 01/07/19 1000   POA: Yes  Wound Type: Venous  Location: Leg Lower  Orientation: Right; Anterior; Lower Dressing Status  Intact; Old drainage 
(russ, profore compression wrap lite) Non-Pressure Injury Full thickness (subcut/muscle) Wound Length (cm) 2.3 cm Wound Width (cm) 2 cm Wound Depth (cm) 0.2 Wound Surface area (cm^2) 4.6 cm^2 Change in Wound Size % -15 Condition of Edges Open Tissue Type Percent Red 30 Tissue Type Percent Yellow 70 Drainage Amount  Small Drainage Color Serous Wound Odor None Periwound Skin Condition Intact Cleansing and Cleansing Agents  Dermal wound cleanser Dressing Type Applied Pressure dressing Procedure Tolerated Well

## 2019-02-22 NOTE — DISCHARGE INSTRUCTIONS
Comments     In wound care clinic today:  Cleanse wound with NS or soap and water or commercial wound cleanser  Apply the following topically to wound bed: Graft in place  Apply the following dressings: Dry absorbent dressing, profore with ACE wrap    For Home Care/Self Care  Keep dressing dry and intact when bathing    Leave dressings in place until next visit    [facility]. Inspect your wounds, looking for signs of infection which may include the following:  Increase in redness  Red \"streaks\" from wound  Increase in swelling  Fever  Unusual odor  Change in the amount of wound drainage. Should you experience any significant changes in your wound(s) or have any questions regarding your home care instructions please contact the wound center or your home health company. If after regular business hours, please call your family doctor or local emergency room. Edema Control:   Elevate legs as much as possible. Avoid standing in one position for more than 10 minutes. Avoid setting with legs down. Do not cross legs while sitting. Off-Loading:     Frequent position changes. Do not cross legs while sitting.  Shift weight every 20 minutes or more when sitting for prolonged periods of time.

## 2019-02-28 ENCOUNTER — HOSPITAL ENCOUNTER (OUTPATIENT)
Dept: WOUND CARE | Age: 84
Discharge: HOME | End: 2019-02-28
Attending: PODIATRIST
Payer: MEDICARE

## 2019-02-28 VITALS
HEART RATE: 72 BPM | SYSTOLIC BLOOD PRESSURE: 130 MMHG | DIASTOLIC BLOOD PRESSURE: 56 MMHG | RESPIRATION RATE: 18 BRPM | TEMPERATURE: 98.6 F | OXYGEN SATURATION: 100 %

## 2019-02-28 PROCEDURE — 29581 APPL MULTLAYER CMPRN SYS LEG: CPT

## 2019-03-08 ENCOUNTER — HOSPITAL ENCOUNTER (OUTPATIENT)
Dept: WOUND CARE | Age: 84
Discharge: HOME | End: 2019-03-08
Payer: MEDICARE

## 2019-03-08 VITALS
TEMPERATURE: 98.2 F | SYSTOLIC BLOOD PRESSURE: 152 MMHG | DIASTOLIC BLOOD PRESSURE: 65 MMHG | OXYGEN SATURATION: 100 % | HEART RATE: 77 BPM | RESPIRATION RATE: 18 BRPM

## 2019-03-08 PROCEDURE — 29581 APPL MULTLAYER CMPRN SYS LEG: CPT

## 2019-03-08 NOTE — DISCHARGE INSTRUCTIONS
For Home Care/Self Care  Keep dressing dry and intact when bathing    Leave dressings in place until next visit    Patient to return for wound care in: [address] [place]. Inspect your wounds, looking for signs of infection which may include the following:  Increase in redness  Red \"streaks\" from wound  Increase in swelling  Fever  Unusual odor  Change in the amount of wound drainage. Should you experience any significant changes in your wound(s) or have any questions regarding your home care instructions please contact the wound center or your home health company. If after regular business hours, please call your family doctor or local emergency room. Edema Control:   Elevate legs as much as possible. Avoid standing in one position for more than 10 minutes. Avoid setting with legs down. Do not cross legs while sitting. Off-Loading:     Frequent position changes. Do not cross legs while sitting. Shift weight every 20 minutes or more when sitting for prolonged periods of time.

## 2019-03-08 NOTE — WOUND CARE
03/08/19 1341   Wound Leg Lower Right; Anterior; Lower   Date First Assessed/Time First Assessed: 01/07/19 1000   POA: Yes  Wound Type: Venous  Location: Leg Lower  Orientation: Right; Anterior; Lower   Dressing Status  Clean, dry, and intact   Dressing Type  Absorptive; Compression dressing   Non-Pressure Injury Full thickness (subcut/muscle)   Wound Length (cm) 1.2 cm   Wound Width (cm) 1 cm   Wound Depth (cm) 0.1   Wound Surface area (cm^2) 1.2 cm^2   Change in Wound Size % 70   Condition of Edges Open   Drainage Amount  Moderate   Drainage Color Serosanguinous; Tan   Wound Odor Mild   Periwound Skin Condition Macerated; Other (comment)  (dry)   Cleansing and Cleansing Agents  Other (comment); Soap and water  (vashe)   Dressing Type Applied Other (Comment)  (Proshield, WCL, exu-dry, sophie, profore)   Procedure Tolerated Well   Wound Leg Lower Lateral;Right; Lower   Date First Assessed/Time First Assessed: 01/07/19 1000   POA: Yes  Wound Type: Venous  Location: Leg Lower  Orientation: Lateral;Right; Lower   Dressing Status  Clean, dry, and intact   Dressing Type  Absorptive; Compression dressing   Non-Pressure Injury Full thickness (subcut/muscle)   Wound Length (cm) 3.7 cm   Wound Width (cm) 2.5 cm   Wound Depth (cm) 0.1   Wound Surface area (cm^2) 9.25 cm^2   Change in Wound Size % 54.32   Condition of Base Pink;Slough   Tissue Type Pink;Yellow   Drainage Amount  Moderate   Drainage Color Serosanguinous; Tan   Wound Odor Mild   Periwound Skin Condition Macerated   Cleansing and Cleansing Agents  Other (comment); Soap and water  (vashe)   Dressing Type Applied Other (Comment)  (Proshield, WCL, exu-dry, sophie, profore)   Procedure Tolerated Well   Wound Leg Lower Right;Lateral;Superior   Date First Assessed/Time First Assessed: 01/07/19 1340   POA: Yes  Wound Type: Venous  Location: Leg Lower  Orientation: Right;Lateral;Superior   Dressing Status  Clean, dry, and intact   Dressing Type  Absorptive; Compression dressing Non-Pressure Injury Full thickness (subcut/muscle)   Wound Length (cm) 2 cm   Wound Width (cm) 1.6 cm   Wound Depth (cm) 0.1   Wound Surface area (cm^2) 3.2 cm^2   Change in Wound Size % 20   Condition of Base Pink;Slough   Condition of Edges Open   Tissue Type Red;Pink;Yellow   Drainage Amount  Moderate   Drainage Color Serosanguinous; Tan   Wound Odor Mild   Periwound Skin Condition Macerated   Cleansing and Cleansing Agents  Other (comment); Soap and water  (vashe)   Dressing Type Applied Other (Comment)  (Proshield, WCL, exu-dry, sophie, pita)   Procedure Tolerated Well

## 2019-03-14 ENCOUNTER — HOSPITAL ENCOUNTER (OUTPATIENT)
Dept: WOUND CARE | Age: 84
Discharge: HOME | End: 2019-03-14
Payer: MEDICARE

## 2019-03-14 VITALS
OXYGEN SATURATION: 99 % | RESPIRATION RATE: 18 BRPM | SYSTOLIC BLOOD PRESSURE: 137 MMHG | TEMPERATURE: 97.9 F | DIASTOLIC BLOOD PRESSURE: 63 MMHG

## 2019-03-14 PROCEDURE — 29581 APPL MULTLAYER CMPRN SYS LEG: CPT

## 2019-03-14 NOTE — WOUND CARE
03/14/19 1419   Wound Leg Lower Right;Lateral;Superior   Date First Assessed/Time First Assessed: 01/07/19 1340   POA: Yes  Wound Type: Venous  Location: Leg Lower  Orientation: Right;Lateral;Superior   Dressing Status  Clean, dry, and intact   Dressing Type  Absorptive; Compression dressing   Non-Pressure Injury Full thickness (subcut/muscle)   Wound Length (cm) 1.5 cm   Wound Width (cm) 1 cm   Wound Depth (cm) 0.1   Wound Surface area (cm^2) 1.5 cm^2   Change in Wound Size % 62.5   Condition of Base Pink;Slough   Condition of Edges Open   Tissue Type Pink;Red;Yellow   Drainage Amount  Small    Drainage Color Serosanguinous   Wound Odor Mild   Periwound Skin Condition Intact   Cleansing and Cleansing Agents  (foam wash, vashe)   Dressing Type Applied (WCL,sorbian,sophie,profore)   Procedure Tolerated Well   Wound Leg Lower Lateral;Right; Lower   Date First Assessed/Time First Assessed: 01/07/19 1000   POA: Yes  Wound Type: Venous  Location: Leg Lower  Orientation: Lateral;Right; Lower   Dressing Status  Clean, dry, and intact   Dressing Type  Absorptive; Compression dressing   Non-Pressure Injury Full thickness (subcut/muscle)   Wound Length (cm) 3 cm   Wound Width (cm) 2 cm   Wound Depth (cm) 0.1   Wound Surface area (cm^2) 6 cm^2   Change in Wound Size % 70.37   Condition of Base Pink;Slough   Tissue Type Pink;Yellow   Drainage Amount  Small    Drainage Color Serosanguinous   Wound Odor Mild   Periwound Skin Condition Intact   Cleansing and Cleansing Agents  (foam wash, vashe)   Dressing Type Applied (WCL,sorbian,sophie,profore)   Procedure Tolerated Well   Wound Leg Lower Right; Anterior; Lower   Date First Assessed/Time First Assessed: 01/07/19 1000   POA: Yes  Wound Type: Venous  Location: Leg Lower  Orientation: Right; Anterior; Lower   Dressing Status  Clean, dry, and intact   Dressing Type  Absorptive; Compression dressing   Wound Length (cm) 0 cm   Wound Width (cm) 0 cm   Wound Depth (cm) 0   Wound Surface area (cm^2) 0 cm^2   Change in Wound Size % 100   Condition of Edges Closed   Drainage Amount  None   Cleansing and Cleansing Agents  (foam wash,vashe)   Procedure Tolerated Well

## 2019-03-21 ENCOUNTER — HOSPITAL ENCOUNTER (OUTPATIENT)
Dept: WOUND CARE | Age: 84
Discharge: HOME | End: 2019-03-21
Payer: MEDICARE

## 2019-03-21 VITALS
OXYGEN SATURATION: 100 % | HEART RATE: 110 BPM | DIASTOLIC BLOOD PRESSURE: 84 MMHG | TEMPERATURE: 97.6 F | RESPIRATION RATE: 18 BRPM | SYSTOLIC BLOOD PRESSURE: 154 MMHG

## 2019-03-21 PROCEDURE — 29581 APPL MULTLAYER CMPRN SYS LEG: CPT

## 2019-03-21 NOTE — WOUND CARE
03/21/19 1411 Wound Leg Lower Right;Lateral;Superior Date First Assessed/Time First Assessed: 01/07/19 1340   POA: Yes  Wound Type: Venous  Location: Leg Lower  Orientation: Right;Lateral;Superior Dressing Status  Clean, dry, and intact Dressing Type   
(sophie, sorbion satchet s, wcl, profore wrap) Non-Pressure Injury Full thickness (subcut/muscle) Wound Length (cm)  
(scabbed over) Condition of Base Pink;Slough Condition of Edges Open Tissue Type Pink;Yellow Drainage Amount  Small Drainage Color Serosanguinous Wound Odor None Periwound Skin Condition Intact Cleansing and Cleansing Agents (foam wash, vashe and barrier wipes) Dressing Type Applied (promogran, wcl, sorbion, sophie and profore wrap (no coban)) Procedure Tolerated Well Wound Leg Lower Lateral;Right; Lower Date First Assessed/Time First Assessed: 01/07/19 1000   POA: Yes  Wound Type: Venous  Location: Leg Lower  Orientation: Lateral;Right; Lower Dressing Status  Clean, dry, and intact Dressing Type   
(sophie, sorbion, wcl and profore wrap) Non-Pressure Injury Full thickness (subcut/muscle) Wound Length (cm) 3 cm Wound Width (cm) 1.9 cm Wound Depth (cm) 0.1 Wound Surface area (cm^2) 5.7 cm^2 Change in Wound Size % 71.85 Condition of Base Pink;Slough Tissue Type Pink;Yellow Drainage Amount  Small Drainage Color Serosanguinous Wound Odor Mild Periwound Skin Condition Intact Cleansing and Cleansing Agents (foam wash, vashe and barrier wipes) Dressing Type Applied  
(sophie, sorbion satchet s, wcl, promogran and profore wrap)

## 2019-03-28 ENCOUNTER — HOSPITAL ENCOUNTER (OUTPATIENT)
Dept: WOUND CARE | Age: 84
Discharge: HOME | End: 2019-03-28
Payer: MEDICARE

## 2019-03-28 VITALS
OXYGEN SATURATION: 90 % | DIASTOLIC BLOOD PRESSURE: 96 MMHG | TEMPERATURE: 98.1 F | HEART RATE: 82 BPM | SYSTOLIC BLOOD PRESSURE: 186 MMHG

## 2019-03-28 PROCEDURE — 29581 APPL MULTLAYER CMPRN SYS LEG: CPT

## 2019-03-28 NOTE — WOUND CARE
03/28/19 1134 Wound Leg Lower Right;Lateral;Superior Date First Assessed/Time First Assessed: 01/07/19 1340   POA: Yes  Wound Type: Venous  Location: Leg Lower  Orientation: Right;Lateral;Superior Dressing Status  Clean, dry, and intact Dressing Type  Absorptive Non-Pressure Injury (scabbed over) Condition of Base Epithelializing Condition of Edges Closed Tissue Type Pink Drainage Amount  None Wound Odor None Periwound Skin Condition Intact Cleansing and Cleansing Agents  Dermal wound cleanser Procedure Tolerated Well Wound Leg Lower Lateral;Right; Lower Date First Assessed/Time First Assessed: 01/07/19 1000   POA: Yes  Wound Type: Venous  Location: Leg Lower  Orientation: Lateral;Right; Lower Dressing Status  Intact; Old drainage Dressing Type  Absorptive Non-Pressure Injury Full thickness (subcut/muscle) Wound Length (cm) 3 cm Wound Width (cm) 1.3 cm Wound Depth (cm) 0.1 Wound Surface area (cm^2) 3.9 cm^2 Change in Wound Size % 80.74 Condition of Base Pink Tissue Type Pink Drainage Amount  Small Drainage Color Serosanguinous Wound Odor Mild Periwound Skin Condition Intact Cleansing and Cleansing Agents (vashe wound cleanser) Dressing Type Applied Absorptive; Compression dressing 
(sorbion sachet) Procedure Tolerated Well

## 2019-03-28 NOTE — DISCHARGE INSTRUCTIONS
Leave dressings in place until next visit    Patient to return for wound care in: 1 week for nurse assessment and dressing change    PLEASE CONTACT OFFICE AS SOON AS POSSIBLE IF UNABLE TO MAKE THIS APPOINTMENT. Inspect your wounds, looking for signs of infection which may include the following:  Increase in redness  Red \"streaks\" from wound  Increase in swelling  Fever  Unusual odor  Change in the amount of wound drainage. Should you experience any significant changes in your wound(s) or have any questions regarding your home care instructions please contact the wound center or your home health company. If after regular business hours, please call your family doctor or local emergency room. Edema Control:   Elevate legs as much as possible. Avoid standing in one position for more than 10 minutes. Avoid setting with legs down. Do not cross legs while sitting. Off-Loading:     Frequent position changes. Do not cross legs while sitting.  Shift weight every 20 minutes or more when sitting for prolonged periods of time.

## 2019-04-04 ENCOUNTER — HOSPITAL ENCOUNTER (OUTPATIENT)
Dept: WOUND CARE | Age: 84
Discharge: HOME | End: 2019-04-04
Payer: MEDICARE

## 2019-04-04 VITALS
OXYGEN SATURATION: 100 % | HEART RATE: 82 BPM | DIASTOLIC BLOOD PRESSURE: 89 MMHG | RESPIRATION RATE: 18 BRPM | SYSTOLIC BLOOD PRESSURE: 162 MMHG | TEMPERATURE: 97.5 F

## 2019-04-04 PROCEDURE — 29581 APPL MULTLAYER CMPRN SYS LEG: CPT

## 2019-04-04 NOTE — WOUND CARE
04/04/19 1147 Wound Leg Lower Lateral;Right; Lower Date First Assessed/Time First Assessed: 01/07/19 1000   POA: Yes  Wound Type: Venous  Location: Leg Lower  Orientation: Lateral;Right; Lower Dressing Status  Clean, dry, and intact Dressing Type   
(ace wrap, profore wrap, sophie, sorbion satchet s) Non-Pressure Injury Full thickness (subcut/muscle) Wound Length (cm) 2.6 cm Wound Width (cm) 1.3 cm Wound Depth (cm) 0.1 Wound Surface area (cm^2) 3.38 cm^2 Change in Wound Size % 83.31 Condition of Base Pink Tissue Type Pink;Red Drainage Amount  Scant Drainage Color Serosanguinous Wound Odor Mild Periwound Skin Condition Intact Cleansing and Cleansing Agents (fpam wash, vashe and barrier wipes) Dressing Type Applied  
(sorbion satchet s, sophie, profore wrap, ace wrap) Procedure Tolerated Well

## 2019-04-11 ENCOUNTER — HOSPITAL ENCOUNTER (OUTPATIENT)
Dept: WOUND CARE | Age: 84
Discharge: HOME | End: 2019-04-11
Payer: MEDICARE

## 2019-04-11 VITALS
RESPIRATION RATE: 17 BRPM | TEMPERATURE: 97.3 F | OXYGEN SATURATION: 99 % | SYSTOLIC BLOOD PRESSURE: 152 MMHG | HEART RATE: 79 BPM | DIASTOLIC BLOOD PRESSURE: 92 MMHG

## 2019-04-11 PROCEDURE — 29581 APPL MULTLAYER CMPRN SYS LEG: CPT

## 2019-04-12 ENCOUNTER — HOSPITAL ENCOUNTER (INPATIENT)
Age: 84
LOS: 4 days | Discharge: NURSING, REHAB OR LONG-TERM CARE | DRG: 292 | End: 2019-04-16
Attending: EMERGENCY MEDICINE | Admitting: INTERNAL MEDICINE
Payer: MEDICARE

## 2019-04-12 ENCOUNTER — APPOINTMENT (OUTPATIENT)
Dept: GENERAL RADIOLOGY | Age: 84
DRG: 292 | End: 2019-04-12
Attending: EMERGENCY MEDICINE
Payer: MEDICARE

## 2019-04-12 DIAGNOSIS — I50.9 CONGESTIVE HEART FAILURE, UNSPECIFIED HF CHRONICITY, UNSPECIFIED HEART FAILURE TYPE (HCC): ICD-10-CM

## 2019-04-12 DIAGNOSIS — R60.0 BILATERAL LOWER EXTREMITY EDEMA: Primary | ICD-10-CM

## 2019-04-12 DIAGNOSIS — J90 PLEURAL EFFUSION: ICD-10-CM

## 2019-04-12 DIAGNOSIS — I48.20 CHRONIC ATRIAL FIBRILLATION (HCC): ICD-10-CM

## 2019-04-12 LAB
ALBUMIN SERPL-MCNC: 3.1 G/DL (ref 3.4–5)
ALBUMIN/GLOB SERPL: 0.9 {RATIO} (ref 0.8–1.7)
ALP SERPL-CCNC: 97 U/L (ref 45–117)
ALT SERPL-CCNC: 21 U/L (ref 13–56)
ANION GAP SERPL CALC-SCNC: 7 MMOL/L (ref 3–18)
AST SERPL-CCNC: 41 U/L (ref 15–37)
BASOPHILS # BLD: 0 K/UL (ref 0–0.1)
BASOPHILS NFR BLD: 1 % (ref 0–2)
BILIRUB SERPL-MCNC: 2.6 MG/DL (ref 0.2–1)
BNP SERPL-MCNC: 8697 PG/ML (ref 0–1800)
BUN SERPL-MCNC: 23 MG/DL (ref 7–18)
BUN/CREAT SERPL: 22 (ref 12–20)
CALCIUM SERPL-MCNC: 8.9 MG/DL (ref 8.5–10.1)
CHLORIDE SERPL-SCNC: 112 MMOL/L (ref 100–108)
CK MB CFR SERPL CALC: 2.8 % (ref 0–4)
CK MB CFR SERPL CALC: 3.2 % (ref 0–4)
CK MB SERPL-MCNC: 2.2 NG/ML (ref 5–25)
CK MB SERPL-MCNC: 2.5 NG/ML (ref 5–25)
CK SERPL-CCNC: 68 U/L (ref 26–192)
CK SERPL-CCNC: 90 U/L (ref 26–192)
CO2 SERPL-SCNC: 27 MMOL/L (ref 21–32)
CREAT SERPL-MCNC: 1.03 MG/DL (ref 0.6–1.3)
DIFFERENTIAL METHOD BLD: ABNORMAL
EOSINOPHIL # BLD: 0 K/UL (ref 0–0.4)
EOSINOPHIL NFR BLD: 1 % (ref 0–5)
ERYTHROCYTE [DISTWIDTH] IN BLOOD BY AUTOMATED COUNT: 17.4 % (ref 11.6–14.5)
GLOBULIN SER CALC-MCNC: 3.6 G/DL (ref 2–4)
GLUCOSE SERPL-MCNC: 131 MG/DL (ref 74–99)
HCT VFR BLD AUTO: 39.2 % (ref 35–45)
HGB BLD-MCNC: 12.3 G/DL (ref 12–16)
LYMPHOCYTES # BLD: 0.7 K/UL (ref 0.9–3.6)
LYMPHOCYTES NFR BLD: 26 % (ref 21–52)
MAGNESIUM SERPL-MCNC: 2.3 MG/DL (ref 1.6–2.6)
MCH RBC QN AUTO: 28.7 PG (ref 24–34)
MCHC RBC AUTO-ENTMCNC: 31.4 G/DL (ref 31–37)
MCV RBC AUTO: 91.4 FL (ref 74–97)
MONOCYTES # BLD: 0.4 K/UL (ref 0.05–1.2)
MONOCYTES NFR BLD: 16 % (ref 3–10)
NEUTS SEG # BLD: 1.6 K/UL (ref 1.8–8)
NEUTS SEG NFR BLD: 56 % (ref 40–73)
PLATELET # BLD AUTO: 131 K/UL (ref 135–420)
PMV BLD AUTO: 11.7 FL (ref 9.2–11.8)
POTASSIUM SERPL-SCNC: 4.6 MMOL/L (ref 3.5–5.5)
PROT SERPL-MCNC: 6.7 G/DL (ref 6.4–8.2)
RBC # BLD AUTO: 4.29 M/UL (ref 4.2–5.3)
SODIUM SERPL-SCNC: 146 MMOL/L (ref 136–145)
TROPONIN I SERPL-MCNC: 0.06 NG/ML (ref 0–0.04)
TROPONIN I SERPL-MCNC: 0.06 NG/ML (ref 0–0.04)
WBC # BLD AUTO: 2.8 K/UL (ref 4.6–13.2)

## 2019-04-12 PROCEDURE — 71045 X-RAY EXAM CHEST 1 VIEW: CPT

## 2019-04-12 PROCEDURE — 82550 ASSAY OF CK (CPK): CPT

## 2019-04-12 PROCEDURE — 99285 EMERGENCY DEPT VISIT HI MDM: CPT

## 2019-04-12 PROCEDURE — 96374 THER/PROPH/DIAG INJ IV PUSH: CPT

## 2019-04-12 PROCEDURE — 65270000029 HC RM PRIVATE

## 2019-04-12 PROCEDURE — 74011250636 HC RX REV CODE- 250/636: Performed by: EMERGENCY MEDICINE

## 2019-04-12 PROCEDURE — 83735 ASSAY OF MAGNESIUM: CPT

## 2019-04-12 PROCEDURE — 93005 ELECTROCARDIOGRAM TRACING: CPT

## 2019-04-12 PROCEDURE — 83880 ASSAY OF NATRIURETIC PEPTIDE: CPT

## 2019-04-12 PROCEDURE — 80053 COMPREHEN METABOLIC PANEL: CPT

## 2019-04-12 PROCEDURE — 85025 COMPLETE CBC W/AUTO DIFF WBC: CPT

## 2019-04-12 RX ORDER — FUROSEMIDE 10 MG/ML
40 INJECTION INTRAMUSCULAR; INTRAVENOUS
Status: COMPLETED | OUTPATIENT
Start: 2019-04-12 | End: 2019-04-12

## 2019-04-12 RX ORDER — LABETALOL HCL 20 MG/4 ML
20 SYRINGE (ML) INTRAVENOUS
Status: DISCONTINUED | OUTPATIENT
Start: 2019-04-12 | End: 2019-04-12

## 2019-04-12 RX ORDER — FUROSEMIDE 20 MG/1
20 TABLET ORAL 2 TIMES DAILY
COMMUNITY
End: 2019-05-31

## 2019-04-12 RX ORDER — CARVEDILOL 25 MG/1
25 TABLET ORAL DAILY
COMMUNITY
End: 2019-05-24

## 2019-04-12 RX ADMIN — FUROSEMIDE 40 MG: 10 INJECTION, SOLUTION INTRAMUSCULAR; INTRAVENOUS at 19:29

## 2019-04-12 NOTE — ED TRIAGE NOTES
Triage: pt with BLE swelling that has worsened in the past 3 days. Pt with occasional SOB. Started on Lasix by PCP on 2/26. Pt with 3 ulcerations to RLE that is cared for by wound care 1x per week.

## 2019-04-12 NOTE — ED PROVIDER NOTES
EMERGENCY DEPARTMENT HISTORY AND PHYSICAL EXAM 
 
Date: 4/12/2019 Patient Name: Fran Hull History of Presenting Illness Chief Complaint Patient presents with  Leg Swelling History Provided By: Patient and Patient's Daughter Additional History (Context): Fran Hull is a 80 y.o. female with PMHX significant for atrial fibrillation not currently on anticoagulation, lymphedema on Lasix 20 mg twice a day presents to the emergency department C/O worsening swelling of her bilateral lower extremities. Patient's daughter reports that the daughter is currently being followed by wound care for a chronic right lower extremity wound. Patient states that she only takes 20 mg of Lasix occasionally because it makes her feel \"unsteady\". Patient reports intermittent shortness of breath however denies chest pain. pt denies fever, chills, nausea or vomiting, and any other sxs or complaints. PCP: Ling Soto MD 
 
Current Outpatient Medications Medication Sig Dispense Refill  furosemide (LASIX) 20 mg tablet Take 20 mg by mouth two (2) times a day.  carvedilol (COREG) 25 mg tablet Take 25 mg by mouth daily. Past History Past Medical History: 
Past Medical History:  
Diagnosis Date  [facility]fib ([facility]  [facility] 2014  Hypertension  Menopause Past Surgical History: 
Past Surgical History:  
Procedure Laterality Date  HX BREAST BIOPSY Left br. Benign  HX HYSTERECTOMY Age 52 Family History: 
Family History Problem Relation Age of Onset  Breast Cancer Maternal Aunt  Breast Cancer Maternal Grandmother Social History: 
Social History Tobacco Use  Smoking status: Never Smoker  Smokeless tobacco: Never Used Substance Use Topics  Alcohol use: Not Currently  Drug use: Not on file Allergies: Allergies Allergen Reactions  Petrolatum,White Rash Review of Systems Review of Systems Constitutional: Negative for chills and fever. HENT: Negative for congestion, ear pain, sinus pain and sore throat. Respiratory: Positive for shortness of breath. Negative for cough. Cardiovascular: Negative for chest pain and leg swelling. Gastrointestinal: Negative for abdominal pain, constipation, diarrhea, nausea and vomiting. Genitourinary: Negative for dysuria, hematuria, vaginal bleeding and vaginal discharge. Musculoskeletal: Positive for joint swelling. Negative for arthralgias, back pain and myalgias. Skin: Negative for pallor and rash. Neurological: Negative for weakness, light-headedness and numbness. All other systems reviewed and are negative. Physical Exam  
 
Vitals:  
 04/12/19 1715 BP: (!) 168/99 Pulse: 70 Resp: 16 Temp: 98 °F (36.7 °C) SpO2: 99% Weight: 81.2 kg (179 lb) Height: 5' 7\" (1.702 m) Physical Exam 
 
Nursing note and vitals reviewed Constitutional: Elderly -American female, no acute distress Head: Normocephalic, Atraumatic Eyes: Pupils are equal, round, and reactive to light, EOMI Neck: Supple, non-tender Cardiovascular: Regular rate and rhythm, no murmurs, rubs, or gallops Chest: Normal work of breathing and chest excursion bilaterally Lungs: Clear to ausculation bilaterally, no wheezes, no rhonchi Abdomen: Soft, non tender, non distended, normoactive bowel sounds Back: No evidence of trauma or deformity Extremities: No evidence of trauma or deformity, +3/+4 pitting edema bilaterally, right lower extremity in Ace wrap dressing, clean dry and intact Skin: Warm and dry, normal cap refill Neuro: Alert and appropriate, CN intact, normal speech, moving all 4 extremities freely and symmetrically Psychiatric: Normal mood and affect Diagnostic Study Results Labs - Recent Results (from the past 12 hour(s)) EKG, 12 LEAD, INITIAL Collection Time: 04/12/19  5:39 PM  
Result Value Ref Range Ventricular Rate 85 BPM  
 Atrial Rate 72 BPM  
 QRS Duration 96 ms  
 Q-T Interval 386 ms QTC Calculation (Bezet) 459 ms Calculated R Axis -45 degrees Calculated T Axis 87 degrees Diagnosis Atrial fibrillation Left axis deviation Incomplete right bundle branch block Anterior infarct (cited on or before 12-APR-2019) Abnormal ECG When compared with ECG of 15-SEP-2011 12:26, Incomplete right bundle branch block is now present Nonspecific T wave abnormality now evident in Lateral leads Radiologic Studies -  
XR CHEST PORT    (Results Pending) RADIOLOGY FINDINGS Chest x-ray shows vascular congestion with right-sided pleural effusion Pending review by Radiologist 
Recorded by Sy Root, DO 
 
 
CT Results  (Last 48 hours) None CXR Results  (Last 48 hours) None Medical Decision Making I am the first provider for this patient. I reviewed the vital signs, available nursing notes, past medical history, past surgical history, family history and social history. Vital Signs-Reviewed the patient's vital signs. Pulse Oximetry Analysis -99 % on room air EKG interpretation: (Preliminary) 5:41 PM   
Atrial fibrillation at 85 bpm.  QTc 459 ms. Right bundle branch block. No acute ST elevations. Records Reviewed: Nursing Notes and Old Medical Records Provider Notes:  
80 y.o. female with a history of atrial fibrillation not on anticoagulation, chronic bilateral lower extremity edema, noncompliant on her Lasix regimen coming with worsening lower extremity edema. On exam patient is in no respiratory distress. She is afebrile with appropriate vital signs. She does have +3/+4 pitting edema bilaterally. She has an ACE dressing clean, dry and intact in the right lower extremity. Will evaluate for acute exacerbation of her congestive heart failure. However if lab work is within normal limits, will encourage compliance on her Lasix. Marquise Nichole Procedures: 
Procedures ED Course:  
5:30 PM Initial assessment performed. The patients presenting problems have been discussed, and they are in agreement with the care plan formulated and outlined with them. I have encouraged them to ask questions as they arise throughout their visit. 
 
7:00 PM patient noted to be persistently hypertensive. Patient will be given her home dose of Coreg. Mild vascular congestion with right-sided pleural effusion on chest x-ray. Patient saturating 99% in room air, in no respiratory distress. Will give patient a dose of IV Lasix. Cardiac enzymes minimally elevated 0.06. Will repeat cardiac enzymes in 2 hours. 7:23 PM 
Patient's presentation, labs/imaging and plan of care was reviewed with Jaun Romero MD as part of sign out. They will review repeat cardiac enzymes as part of the plan discussed with the patient. Jaun Romero MD's assistance in completion of this plan is greatly appreciated but it should be noted that I will be the provider of record for this patient. Diagnosis and Disposition Critical Care Time: 30 minutes CLINICAL IMPRESSION: 
 
1. Bilateral lower extremity edema 2. Pleural effusion 3. Congestive heart failure, unspecified HF chronicity, unspecified heart failure type ([facility] 4. Chronic atrial fibrillation (HCC)   
 
 
____________________________________ Please note that this dictation was completed with [employer], the computer voice recognition software. Quite often unanticipated grammatical, syntax, homophones, and other interpretive errors are inadvertently transcribed by the computer software. Please disregard these errors. Please excuse any errors that have escaped final proofreading.

## 2019-04-13 ENCOUNTER — HOSPITAL ENCOUNTER (INPATIENT)
Dept: CT IMAGING | Age: 84
Discharge: HOME | DRG: 292 | End: 2019-04-13
Attending: INTERNAL MEDICINE
Payer: MEDICARE

## 2019-04-13 PROBLEM — R77.8 ELEVATED TROPONIN: Status: ACTIVE | Noted: 2019-04-13

## 2019-04-13 PROBLEM — R29.810 FACIAL DROOP: Status: ACTIVE | Noted: 2019-04-13

## 2019-04-13 PROBLEM — E11.9 DIABETES MELLITUS TYPE 2, DIET-CONTROLLED (HCC): Status: ACTIVE | Noted: 2019-04-13

## 2019-04-13 LAB
ALBUMIN SERPL-MCNC: 3 G/DL (ref 3.4–5)
ALBUMIN/GLOB SERPL: 0.9 {RATIO} (ref 0.8–1.7)
ALP SERPL-CCNC: 89 U/L (ref 45–117)
ALT SERPL-CCNC: 22 U/L (ref 13–56)
ANION GAP SERPL CALC-SCNC: 9 MMOL/L (ref 3–18)
AST SERPL-CCNC: 25 U/L (ref 15–37)
BILIRUB SERPL-MCNC: 2.2 MG/DL (ref 0.2–1)
BUN SERPL-MCNC: 21 MG/DL (ref 7–18)
BUN/CREAT SERPL: 22 (ref 12–20)
CALCIUM SERPL-MCNC: 9 MG/DL (ref 8.5–10.1)
CHLORIDE SERPL-SCNC: 110 MMOL/L (ref 100–108)
CK MB CFR SERPL CALC: 3.7 % (ref 0–4)
CK MB CFR SERPL CALC: 4.1 % (ref 0–4)
CK MB CFR SERPL CALC: 4.3 % (ref 0–4)
CK MB SERPL-MCNC: 2.1 NG/ML (ref 5–25)
CK MB SERPL-MCNC: 2.2 NG/ML (ref 5–25)
CK MB SERPL-MCNC: 2.3 NG/ML (ref 5–25)
CK SERPL-CCNC: 54 U/L (ref 26–192)
CK SERPL-CCNC: 54 U/L (ref 26–192)
CK SERPL-CCNC: 57 U/L (ref 26–192)
CO2 SERPL-SCNC: 28 MMOL/L (ref 21–32)
CREAT SERPL-MCNC: 0.95 MG/DL (ref 0.6–1.3)
ERYTHROCYTE [DISTWIDTH] IN BLOOD BY AUTOMATED COUNT: 17.4 % (ref 11.6–14.5)
EST. AVERAGE GLUCOSE BLD GHB EST-MCNC: 177 MG/DL
GLOBULIN SER CALC-MCNC: 3.2 G/DL (ref 2–4)
GLUCOSE BLD STRIP.AUTO-MCNC: 154 MG/DL (ref 70–110)
GLUCOSE BLD STRIP.AUTO-MCNC: 161 MG/DL (ref 70–110)
GLUCOSE BLD STRIP.AUTO-MCNC: 174 MG/DL (ref 70–110)
GLUCOSE BLD STRIP.AUTO-MCNC: 78 MG/DL (ref 70–110)
GLUCOSE SERPL-MCNC: 127 MG/DL (ref 74–99)
HBA1C MFR BLD: 7.8 % (ref 4.2–5.6)
HCT VFR BLD AUTO: 38.2 % (ref 35–45)
HGB BLD-MCNC: 11.8 G/DL (ref 12–16)
MAGNESIUM SERPL-MCNC: 2.1 MG/DL (ref 1.6–2.6)
MCH RBC QN AUTO: 28.3 PG (ref 24–34)
MCHC RBC AUTO-ENTMCNC: 30.9 G/DL (ref 31–37)
MCV RBC AUTO: 91.6 FL (ref 74–97)
PLATELET # BLD AUTO: 112 K/UL (ref 135–420)
PMV BLD AUTO: 11.6 FL (ref 9.2–11.8)
POTASSIUM SERPL-SCNC: 4 MMOL/L (ref 3.5–5.5)
PROT SERPL-MCNC: 6.2 G/DL (ref 6.4–8.2)
RBC # BLD AUTO: 4.17 M/UL (ref 4.2–5.3)
SODIUM SERPL-SCNC: 147 MMOL/L (ref 136–145)
TROPONIN I SERPL-MCNC: 0.06 NG/ML (ref 0–0.04)
WBC # BLD AUTO: 2 K/UL (ref 4.6–13.2)

## 2019-04-13 PROCEDURE — 85027 COMPLETE CBC AUTOMATED: CPT

## 2019-04-13 PROCEDURE — 82962 GLUCOSE BLOOD TEST: CPT

## 2019-04-13 PROCEDURE — 70450 CT HEAD/BRAIN W/O DYE: CPT

## 2019-04-13 PROCEDURE — 83036 HEMOGLOBIN GLYCOSYLATED A1C: CPT

## 2019-04-13 PROCEDURE — 74011636637 HC RX REV CODE- 636/637: Performed by: INTERNAL MEDICINE

## 2019-04-13 PROCEDURE — 36415 COLL VENOUS BLD VENIPUNCTURE: CPT

## 2019-04-13 PROCEDURE — 74011250637 HC RX REV CODE- 250/637: Performed by: HOSPITALIST

## 2019-04-13 PROCEDURE — 80053 COMPREHEN METABOLIC PANEL: CPT

## 2019-04-13 PROCEDURE — 65660000000 HC RM CCU STEPDOWN

## 2019-04-13 PROCEDURE — 82550 ASSAY OF CK (CPK): CPT

## 2019-04-13 PROCEDURE — 97116 GAIT TRAINING THERAPY: CPT

## 2019-04-13 PROCEDURE — 74011250636 HC RX REV CODE- 250/636: Performed by: INTERNAL MEDICINE

## 2019-04-13 PROCEDURE — 83735 ASSAY OF MAGNESIUM: CPT

## 2019-04-13 PROCEDURE — 97161 PT EVAL LOW COMPLEX 20 MIN: CPT

## 2019-04-13 RX ORDER — MAGNESIUM SULFATE 100 %
4 CRYSTALS MISCELLANEOUS AS NEEDED
Status: DISCONTINUED | OUTPATIENT
Start: 2019-04-13 | End: 2019-04-16 | Stop reason: HOSPADM

## 2019-04-13 RX ORDER — ACETAMINOPHEN 325 MG/1
650 TABLET ORAL
Status: DISCONTINUED | OUTPATIENT
Start: 2019-04-13 | End: 2019-04-16 | Stop reason: HOSPADM

## 2019-04-13 RX ORDER — INSULIN LISPRO 100 [IU]/ML
INJECTION, SOLUTION INTRAVENOUS; SUBCUTANEOUS
Status: DISCONTINUED | OUTPATIENT
Start: 2019-04-13 | End: 2019-04-16 | Stop reason: HOSPADM

## 2019-04-13 RX ORDER — DEXTROSE 50 % IN WATER (D50W) INTRAVENOUS SYRINGE
25-50 AS NEEDED
Status: DISCONTINUED | OUTPATIENT
Start: 2019-04-13 | End: 2019-04-16 | Stop reason: HOSPADM

## 2019-04-13 RX ORDER — FUROSEMIDE 10 MG/ML
40 INJECTION INTRAMUSCULAR; INTRAVENOUS EVERY 12 HOURS
Status: COMPLETED | OUTPATIENT
Start: 2019-04-13 | End: 2019-04-14

## 2019-04-13 RX ORDER — NALOXONE HYDROCHLORIDE 0.4 MG/ML
0.4 INJECTION, SOLUTION INTRAMUSCULAR; INTRAVENOUS; SUBCUTANEOUS AS NEEDED
Status: DISCONTINUED | OUTPATIENT
Start: 2019-04-13 | End: 2019-04-16 | Stop reason: HOSPADM

## 2019-04-13 RX ORDER — CARVEDILOL 12.5 MG/1
25 TABLET ORAL 2 TIMES DAILY WITH MEALS
Status: DISCONTINUED | OUTPATIENT
Start: 2019-04-13 | End: 2019-04-15

## 2019-04-13 RX ORDER — ONDANSETRON 2 MG/ML
4 INJECTION INTRAMUSCULAR; INTRAVENOUS
Status: DISCONTINUED | OUTPATIENT
Start: 2019-04-13 | End: 2019-04-16 | Stop reason: HOSPADM

## 2019-04-13 RX ORDER — HEPARIN SODIUM 5000 [USP'U]/ML
5000 INJECTION, SOLUTION INTRAVENOUS; SUBCUTANEOUS EVERY 8 HOURS
Status: DISCONTINUED | OUTPATIENT
Start: 2019-04-13 | End: 2019-04-16 | Stop reason: HOSPADM

## 2019-04-13 RX ADMIN — CARVEDILOL 25 MG: 12.5 TABLET, FILM COATED ORAL at 17:10

## 2019-04-13 RX ADMIN — INSULIN LISPRO 2 UNITS: 100 INJECTION, SOLUTION INTRAVENOUS; SUBCUTANEOUS at 22:09

## 2019-04-13 RX ADMIN — FUROSEMIDE 40 MG: 10 INJECTION, SOLUTION INTRAMUSCULAR; INTRAVENOUS at 22:08

## 2019-04-13 RX ADMIN — HEPARIN SODIUM 5000 UNITS: 5000 INJECTION INTRAVENOUS; SUBCUTANEOUS at 12:09

## 2019-04-13 RX ADMIN — HEPARIN SODIUM 5000 UNITS: 5000 INJECTION INTRAVENOUS; SUBCUTANEOUS at 22:08

## 2019-04-13 RX ADMIN — HEPARIN SODIUM 5000 UNITS: 5000 INJECTION INTRAVENOUS; SUBCUTANEOUS at 03:18

## 2019-04-13 RX ADMIN — INSULIN LISPRO 2 UNITS: 100 INJECTION, SOLUTION INTRAVENOUS; SUBCUTANEOUS at 06:47

## 2019-04-13 RX ADMIN — FUROSEMIDE 40 MG: 10 INJECTION, SOLUTION INTRAMUSCULAR; INTRAVENOUS at 09:47

## 2019-04-13 NOTE — WOUND CARE
Consulted for leg ulcers. Patient is routine with wound clinic, sees Dr. Desiree Wang as outpatient. Wound was assessed and treated on Thursday, during visit, not due to be changed until next Thursday 04/18/19. Wrap intact with no strikethrough drainage. Daughter at bedside and states she will call clinic to make a follow up appointment next week with Dr. Desiree Wang. Please consult wound care with any concerns or changes.

## 2019-04-13 NOTE — PROGRESS NOTES
0700: Assumed care of pt from [address] 
1030: Pt resting no c/o pain. 4 Ps addressed. 1230: pt resting no c/o pain 4 p's addressed. 1400: pt resting no c/o pain 4 ps addressed. 1700: pt b/p elevated coreg given per order. Pt has no c/o pain. 1830: pt up in rosas stretching legs no c/o pain. Walked back to room with pt.

## 2019-04-13 NOTE — PROGRESS NOTES
Problem: Mobility Impaired (Adult and Pediatric) Goal: *Acute Goals and Plan of Care (Insert Text) Description In 1-7 days pt will be able to perform: ST.  Bed mobility:  Rolling L to R to L modified independent for positioning. 2.  Supine to sit to supine S with HR for meals. 3.  Sit to stand to sit SBA with RW in prep for ambulation. LT.  Gait:  Ambulate >150ft S with RW, WBAT, for home/community mobility. 2.  Activity tolerance: Tolerate up in chair 1-2 hours for ADL?s. 
3.  Patient/Family Education:  Patient/family to be independent with HEP for follow-up care and safe discharge. Note: PHYSICAL THERAPY EVALUATION Patient: Socorro Andrade (80 y.o. female) Date: 2019 Primary Diagnosis: Pleural effusion [J90] CHF (congestive heart failure) ([facility] [I50.9] Chronic atrial fibrillation (HCC) [I48.2] CHF (congestive heart failure) ([facility] [I50.9] Pleural effusion [J90] Elevated troponin [R74.8] Facial droop [R29.810] Precautions:   Fall ASSESSMENT : 
Based on the objective data described below, the patient presents with decreased functional mobility and independence in regard to bed mobility, transfers, gt quality and tolerance, AROM, strength, pain, balance, activity tolerance, and safety. Pt rating pain on numerical pain scale 0/10 but c/o chronic pain in B knees. Pt uses rollator and lives in [facility]. Pt uses rollator for ambulation. Family present and voiced concerns pt has sustained numerous falls recently, most recent Tuesday, 2019. Pt required min A/CGAfor supine<>sit and min A for sit<>stand. Pt required multiple sit>stand attempts prior to success. Pt required vc for safe techniques. Pt able to participate in gt training w/ RW, WBAT, GB and CGA w/ antalgic pattern. Pt had [place] in place limiting gt distance. Pt returned to supine in bed w/ all needs within reach. Nurse aware and family present. Recommend rehab upon hospital d/c. Patient will benefit from skilled intervention to address the above impairments. Patient?s rehabilitation potential is considered to be Good Factors which may influence rehabilitation potential include:  
? None noted ? Mental ability/status ? Medical condition ? Home/family situation and support systems ? Safety awareness 
? Pain tolerance/management 
? Other: PLAN : 
Recommendations and Planned Interventions: 
?           Bed Mobility Training             ? Neuromuscular Re-Education ? Transfer Training                   ? Orthotic/Prosthetic Training 
? Gait Training                          ? Modalities ? Therapeutic Exercises          ? Edema Management/Control ? Therapeutic Activities            ? Patient and Family Training/Education ? Other (comment): Frequency/Duration: Patient will be followed by physical therapy 1-2 times per day/4-7 days per week to address goals. Discharge Recommendations: Rehab SNF Further Equipment Recommendations for Discharge: N/A  
 
SUBJECTIVE:  
Patient stated ? I haven't been out of bed yet. ? OBJECTIVE DATA SUMMARY:  
 
Past Medical History:  
Diagnosis Date A-fib ([facility] Breast cancer ([facility] 2014 Hypertension Menopause Past Surgical History:  
Procedure Laterality Date HX BREAST BIOPSY Left br. Benign HX HYSTERECTOMY Age 52 Barriers to Learning/Limitations: None Compensate with: visual, verbal, tactile, kinesthetic cues/model Prior Level of Function/Home Situation:  
Home Situation Home Environment: Independent living([facility]) # Steps to Enter: 0 One/Two Story Residence: One story # of Interior Steps: 0 Height of Each Step (in): 0 inches Interior Rails: None Lift Chair Available: No 
Living Alone:  Yes 
 Support Systems: Family member(s) Patient Expects to be Discharged to[de-identified] Rehabilitation facility Current DME Used/Available at Home: [employer], rollator Critical Behavior: 
Neurologic State: Alert; Appropriate for age Orientation Level: Oriented X4 Cognition: Appropriate decision making; Appropriate for age attention/concentration; Appropriate safety awareness; Follows commands Safety/Judgement: Awareness of environment Psychosocial 
Patient Behaviors: Calm; Cooperative Family  Behaviors: Supportive;Calm Purposeful Interaction: Yes Pt Identified Daily Priority: Clinical issues (comment) [employer] Process: Nurture loving kindness;Establish trust;Teaching/learning; Attend basic human needs;Create healing environment Caring Interventions: Reassure Reassure: Therapeutic listening; Informing;Caring rounds Skin Condition/Temp: Dry;Warm Family  Behaviors: Supportive;Calm Skin Integrity: Wound (add Wound LDA) Skin Integumentary Skin Color: Appropriate for ethnicity Skin Condition/Temp: Dry;Warm 
Skin Integrity: Wound (add Wound LDA) Turgor: Epidermis thin w/ loss of subcut tissue Hair Growth: Present Varicosities: Absent Strength:   
Strength: Generally decreased, functional 
Tone & Sensation:  
Tone: Normal 
Sensation: Impaired Range Of Motion: 
AROM: Generally decreased, functional 
Functional Mobility: 
Bed Mobility: 
Supine to Sit: Contact guard assistance; Additional time(vc) 
Sit to Supine: Minimum assistance; Additional time(vc) 
Scooting: Contact guard assistance; Additional time(vc) 
Transfers: 
Sit to Stand: Minimum assistance; Additional time(vc) 
Stand to Sit: Contact guard assistance(vc) 
Balance:  
Sitting: Intact Standing: Intact; Without support Ambulation/Gait Training: 
Distance (ft): 20 Feet (ft) Assistive Device: Walker, rolling Ambulation - Level of Assistance: Contact guard assistance(vc) 
Gait Abnormalities: Antalgic;Decreased step clearance Base of Support: Widened Stance: Weight shift;Time Speed/Berenice: Slow Step Length: Left shortened;Right shortened Swing Pattern: Left asymmetrical;Right asymmetrical 
Interventions: Safety awareness training; Tactile cues; Verbal cues; Visual/Demos Therapeutic Exercises:  
Pain: 
Pain Scale 1: Numeric (0 - 10) Pain Intensity 1: 0 Activity Tolerance:  
Fair Please refer to the flowsheet for vital signs taken during this treatment. After treatment:  
?         Patient left in no apparent distress sitting up in chair ? Patient left in no apparent distress in bed 
? Call bell left within reach ? Nursing notified ? Caregiver present ? Bed alarm activated COMMUNICATION/EDUCATION:  
?         Fall prevention education was provided and the patient/caregiver indicated understanding. ? Patient/family have participated as able in goal setting and plan of care. ?         Patient/family agree to work toward stated goals and plan of care. ?         Patient understands intent and goals of therapy, but is neutral about his/her participation. ? Patient is unable to participate in goal setting and plan of care. Thank you for this referral. 
Rosario Mejia, PT Time Calculation: 24 mins Eval Complexity: History: HIGH Complexity :3+ comorbidities / personal factors will impact the outcome/ POC Exam:MEDIUM Complexity : 3 Standardized tests and measures addressing body structure, function, activity limitation and / or participation in recreation  Presentation: LOW Complexity : Stable, uncomplicated  Clinical Decision Making:Low Complexity    Overall Complexity:LOW

## 2019-04-13 NOTE — PROGRESS NOTES
Chart reviewed by CM on call. Transition of care TBD. Met with pt at bedside. Daughter present. State pt lives in [place], [facility]. Uses a RW. Daughter reports that pt has had frequent falls and she is concerned that pt may need to move into assistant living. Discussed a possible SNF stay at discharge. Daughter is interested in the information. Left a list of local SNF. Provider please order PT/OT if appropriate. Care Management Interventions PCP Verified by CM: Yes Transition of Care Consult (CM Consult): Other Current Support Network: Other Confirm Follow Up Transport: Family Plan discussed with Pt/Family/Caregiver: Yes Reason for Admission:   CHF 
               
RRAT Score:    20 Do you (patient/family) have any concerns for transition/discharge? Interested in SNF Plan for utilizing home health:   TBD Current Advanced Directive/Advance Care Plan:   
 
Likelihood of readmission? Mod/high Transition of Care Plan:      TBD- left a list of SNF

## 2019-04-13 NOTE — ED NOTES
Verbal shift change report given to JT Kimbrough (oncoming nurse) by Ricardo Frank RN (offgoing nurse). Report included the following information SBAR, ED Summary, MAR and Recent Results.

## 2019-04-13 NOTE — H&P
History & Physical 
Patient: Michael Lama MRN: [id number]  CSN: [id number] YOB: 1926  Age: 80 y.o. Sex: female DOA: 4/12/2019 Chief Complaint:  
Chief Complaint Patient presents with  Leg Swelling HPI:  
 
Michael Lama is a 80 y.o.  female who has hx of HTN, Afib, Breast Cancer presents to ER for the second time in 2 weeks with complaints of dyspnea on exertion and worsening lower leg edema; Also with complaints of fall from knees giving out. 2 weeks ago went to [facility] ER Has had episodes of dysarthria and facial droop since then Family concerned about Stroke. She stooped AC for Atrial fibrillation due to GI bleeding; Has been off AC for 2 years now Currently lives in independent living [place] Requesting other options as they are afraid of her living along. Recently started on Lasix by Dr. Mara Johnson but stopped taking due to inability to get to bathroom on time In ER found to have mild to moderate right pleural effusion Given 40mg of IV lasix with diuresis of 500cc and some improvement of dyspnea Past Medical History:  
Diagnosis Date  A-fib ([facility] [address] [facility]  Breast cancer ([facility] [address] 2014  Hypertension  Menopause Past Surgical History:  
Procedure Laterality Date  HX BREAST BIOPSY Left br. Benign  HX HYSTERECTOMY Age 52 Family History Problem Relation Age of Onset  Breast Cancer Maternal Aunt  Breast Cancer Maternal Grandmother Social History Socioeconomic History  Marital status:  Spouse name: Not on file  Number of children: Not on file  Years of education: Not on file  Highest education level: Not on file Tobacco Use  Smoking status: Never Smoker  Smokeless tobacco: Never Used Substance and Sexual Activity  Alcohol use: Not Currently Other Topics Concern Prior to Admission medications Medication Sig Start Date End Date Taking? Authorizing Provider  
furosemide (LASIX) 20 mg tablet Take 20 mg by mouth two (2) times a day. Yes Other, MD Smith  
carvedilol (COREG) 25 mg tablet Take 25 mg by mouth daily. Yes Other, MD Smith  
 
 
Allergies Allergen Reactions  Petrolatum,White Rash Review of Systems GENERAL: Patient alert, awake and oriented times 3, able to communicate full sentences and not in distress. HEENT: No change in vision, no earache, tinnitus, sore throat or sinus congestion. Facial droop left noted worsening dysarthria falls NECK: No pain or stiffness. PULMONARY: +shortness of breath,  cough or wheeze. Cardiovascular: no pnd or orthopnea, no CP GASTROINTESTINAL: No abdominal pain, nausea, vomiting or diarrhea, melena or bright red blood per rectum. Hx of GI bleeding on blood thinners GENITOURINARY: No urinary frequency, urgency, hesitancy or dysuria. MUSCULOSKELETAL: + joint or muscle pain, no back pain, no recent trauma. Bilateral end stage knee arthritis DERMATOLOGIC: chronic leg ulcers followed by our wound clinic ENDOCRINE: No polyuria, polydipsia, no heat or cold intolerance. No recent change in weight. HEMATOLOGICAL: No anemia or easy bruising or bleeding. NEUROLOGIC: No headache, seizures, numbness, tingling or weakness. Both knees Physical Exam:  
 
Physical Exam: 
Visit Vitals /85 (BP 1 Location: Left arm, BP Patient Position: At rest) Pulse 73 Temp 97.5 °F (36.4 °C) Resp 16 Ht 5' 7\" (1.702 m) Wt 84.8 kg (187 lb) SpO2 100% BMI 29.29 kg/m² O2 Device: Room air Temp (24hrs), Av °F (36.7 °C), Min:97.5 °F (36.4 °C), Max:98.4 °F (36.9 °C) 
   1901 -  0700 In: -  
Out: 7335 [Urine:1275]   No intake/output data recorded. General:  Alert, cooperative, no distress, appears stated age. Mild dysarthria Head: Normocephalic, without obvious abnormality, atraumatic. Eyes:  Conjunctivae/corneas clear. PERRL, EOMs intact. Nose: Nares normal. No drainage or sinus tenderness. Neck: Supple, symmetrical, trachea midline, no adenopathy, thyroid: no enlargement, no carotid bruit and no JVD. Lungs:   Clear to auscultation bilaterally. diminished breath sounds on right Heart:  Irregularly irregular  rate and rhythm, S1, S2 normal.  
  Abdomen: Soft, non-tender. Bowel sounds normal.   
Extremities: Extremities normal, atraumatic, no cyanosis +3pitting edema  edema. Pulses: 2+ and symmetric all extremities. Skin: Right leg  Compression wrap with improving ulcers 2/3 not visulized Neurologic: AAOx3, No focal motor or sensory deficit. Facial droop finger to nose diminished right Labs Reviewed: 
Recent Results (from the past 24 hour(s)) EKG, 12 LEAD, INITIAL Collection Time: 04/12/19  5:39 PM  
Result Value Ref Range Ventricular Rate 85 BPM  
 Atrial Rate 72 BPM  
 QRS Duration 96 ms  
 Q-T Interval 386 ms QTC Calculation (Bezet) 459 ms Calculated R Axis -45 degrees Calculated T Axis 87 degrees Diagnosis Atrial fibrillation Left axis deviation Incomplete right bundle branch block Anterior infarct (cited on or before 12-APR-2019) Abnormal ECG When compared with ECG of 15-SEP-2011 12:26, Incomplete right bundle branch block is now present Nonspecific T wave abnormality now evident in Lateral leads CARDIAC PANEL,(CK, CKMB & TROPONIN) Collection Time: 04/12/19  5:55 PM  
Result Value Ref Range CK 90 26 - 192 U/L  
 CK - MB 2.5 <3.6 ng/ml CK-MB Index 2.8 0.0 - 4.0 % Troponin-I, QT 0.06 (H) 0.0 - 0.045 NG/ML  
CBC WITH AUTOMATED DIFF Collection Time: 04/12/19  6:20 PM  
Result Value Ref Range WBC 2.8 (L) 4.6 - 13.2 K/uL  
 RBC 4.29 4. 20 - 5.30 M/uL  
 HGB 12.3 12.0 - 16.0 g/dL HCT 39.2 35.0 - 45.0 % MCV 91.4 74.0 - 97.0 FL  
 MCH 28.7 24.0 - 34.0 PG  
 MCHC 31.4 31.0 - 37.0 g/dL  
 RDW 17.4 (H) 11.6 - 14.5 % PLATELET 688 (L) 430 - 420 K/uL MPV 11.7 9.2 - 11.8 FL  
 NEUTROPHILS 56 40 - 73 % LYMPHOCYTES 26 21 - 52 % MONOCYTES 16 (H) 3 - 10 % EOSINOPHILS 1 0 - 5 % BASOPHILS 1 0 - 2 %  
 ABS. NEUTROPHILS 1.6 (L) 1.8 - 8.0 K/UL  
 ABS. LYMPHOCYTES 0.7 (L) 0.9 - 3.6 K/UL  
 ABS. MONOCYTES 0.4 0.05 - 1.2 K/UL  
 ABS. EOSINOPHILS 0.0 0.0 - 0.4 K/UL  
 ABS. BASOPHILS 0.0 0.0 - 0.1 K/UL  
 DF AUTOMATED METABOLIC PANEL, COMPREHENSIVE Collection Time: 04/12/19  6:20 PM  
Result Value Ref Range Sodium 146 (H) 136 - 145 mmol/L Potassium 4.6 3.5 - 5.5 mmol/L Chloride 112 (H) 100 - 108 mmol/L  
 CO2 27 21 - 32 mmol/L Anion gap 7 3.0 - 18 mmol/L Glucose 131 (H) 74 - 99 mg/dL BUN 23 (H) 7.0 - 18 MG/DL Creatinine 1.03 0.6 - 1.3 MG/DL  
 BUN/Creatinine ratio 22 (H) 12 - 20 GFR est AA >60 >60 ml/min/1.73m2 GFR est non-AA 50 (L) >60 ml/min/1.73m2 Calcium 8.9 8.5 - 10.1 MG/DL Bilirubin, total 2.6 (H) 0.2 - 1.0 MG/DL  
 ALT (SGPT) 21 13 - 56 U/L  
 AST (SGOT) 41 (H) 15 - 37 U/L Alk. phosphatase 97 45 - 117 U/L Protein, total 6.7 6.4 - 8.2 g/dL Albumin 3.1 (L) 3.4 - 5.0 g/dL Globulin 3.6 2.0 - 4.0 g/dL A-G Ratio 0.9 0.8 - 1.7 NT-PRO BNP Collection Time: 04/12/19  6:20 PM  
Result Value Ref Range NT pro-BNP 8,697 (H) 0 - 1,800 PG/ML  
MAGNESIUM Collection Time: 04/12/19  6:20 PM  
Result Value Ref Range Magnesium 2.3 1.6 - 2.6 mg/dL CARDIAC PANEL,(CK, CKMB & TROPONIN) Collection Time: 04/12/19  9:15 PM  
Result Value Ref Range CK 68 26 - 192 U/L  
 CK - MB 2.2 <3.6 ng/ml CK-MB Index 3.2 0.0 - 4.0 % Troponin-I, QT 0.06 (H) 0.0 - 0.045 NG/ML All lab results for the last 24 hours reviewed. and EKG Procedures/imaging: see electronic medical records for all procedures/Xrays and details which were not copied into this note but were reviewed prior to creation of Plan Assessment/Plan Active Problems: CHF (congestive heart failure) ([facility] [address] (4/12/2019) Check cardiac enzymes Check echo IV lasix q 12 hrs for 4 doses then re- evaluate Check bmp and mag treat as needed Pleural effusion (4/12/2019) Check echo Repeat xray on Sunday If not improving consider IR drainage  Pulmonary eval  
 
  Chronic atrial fibrillation ([facility] [address] (4/12/2019) Rate control with Coreg Hold off on AC due to GI bleed Dysarthria and Facial droop frequent Falls Check dry CT if neg MRI then can decide on risk of aspirin with hx of  GIB 
 
DM diet controlled Sliding scale insulin Chronic Leg ulcers Wound care consult Leg edema Check venous ultrasound Hx of Breast cancer Curative surgery DVT/GI Prophylaxis: Hep SQ 
 
DNR Case management for Bedside commode Placement issues family working on improved placement from independent to maybe assisted living if bed available Discussed with patient at bedside about hospital admission and my plan care, who understood and agree with my plan care.  
 
Mala Cleaning MD 
4/13/2019 11:17 PM

## 2019-04-13 NOTE — ED NOTES
TRANSFER - OUT REPORT: 
 
Verbal report given to [place] RN (name) on vL Henao  being transferred to Tele(unit) for routine progression of care Report consisted of patients Situation, Background, Assessment and  
Recommendations(SBAR). Information from the following report(s) ED Summary was reviewed with the receiving nurse. Lines:  
Peripheral IV 04/12/19 Left Antecubital (Active) Site Assessment Clean, dry, & intact 4/12/2019  5:55 PM  
Phlebitis Assessment 0 4/12/2019  5:55 PM  
Infiltration Assessment 0 4/12/2019  5:55 PM  
Dressing Status Clean, dry, & intact 4/12/2019  5:55 PM  
Dressing Type Transparent 4/12/2019  5:55 PM  
Hub Color/Line Status Patent; Flushed 4/12/2019  5:55 PM  
Action Taken Blood drawn 4/12/2019  5:55 PM  
Alcohol Cap Used Yes 4/12/2019  5:55 PM  
  
 
Opportunity for questions and clarification was provided. Patient transported with: 
 [employer]

## 2019-04-13 NOTE — PROGRESS NOTES
0339 TRANSFER - IN REPORT: 
 
Verbal report received from [address] RN(name) on Martina Byrd  being received from ED(unit) for routine progression of care Report consisted of patients Situation, Background, Assessment and  
Recommendations(SBAR). Information from the following report(s) SBAR, Intake/Output, MAR and Recent Results was reviewed with the receiving nurse. Opportunity for questions and clarification was provided. Assessment completed upon patients arrival to unit and care assumed. 6421 Assessment completed and documented in flow sheet. Pt denies any further needs at this time. Pt in NAD with bed in low position, wheels locked and call bell within reach. Purposeful rounding completed. Pt resting quietly. No further needs voiced at this time. 3803 Reassessment completed with no changes noted. Bed locked, in lowest position, with call light within reach. Purposeful rounding completed. Pt resting quietly. No further needs voiced at this time. 0530 Purposeful rounding completed. Pt resting quietly. No further needs voiced at this time. 4061 Scheduled medications administered as ordered. Purposeful rounding completed. Pt resting quietly. No further needs voiced at this time. 0015 Bedside and Verbal shift change report given to [phone] (oncoming nurse) by ENRIQUE Mckee RN (offgoing nurse). Report included the following information SBAR, Procedure Summary, Intake/Output, MAR and Recent Results.

## 2019-04-13 NOTE — PROGRESS NOTES
Hospitalist Progress Note Patient: Fran Screen MRN: [id number]  CSN: [id number] YOB: 1926  Age: 80 y.o. Sex: female DOA: 4/12/2019 LOS:  LOS: 1 day Assessment/Plan Patient Active Problem List  
Diagnosis Code  Ulcer of right pretibial region, with fat layer exposed ([facility] [address] L97.812  
 CHF (congestive heart failure) (HCC) I50.9  Pleural effusion J90  Chronic atrial fibrillation (HCC) I48.2  Diabetes mellitus type 2, diet-controlled ([facility] [address] E11.9  Facial droop R29.810  Elevated troponin R74.8 79 yo female with history of breast ca, A-fib, HTN admitted for worsening LE swelling. Denies any complains. CHF - continue with diuresis, Last echo 12/2017 with EF of 50% Follow echo Pleural effusion - continue with diuresis, compensated respiratory status. A-fib - rate controlled with coreg. Not on anticoagulation due to GI bleed. DM - continue with SSI. troponin's flat. Concern for facial droop - CT head with no acute findings. Showed Age-indeterminate left parieto-occipital cortical infarct, and small high left frontal region infarct. 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 Age-indeterminate right thalamic lacunar infarct Disposition : 2-3 days Review of systems General: No fevers or chills. Cardiovascular: No chest pain or pressure. No palpitations. Pulmonary: No shortness of breath. Gastrointestinal: No nausea, vomiting. Physical Exam: 
General: Awake, cooperative, no acute distress   
HEENT: NC, Atraumatic. PERRLA, anicteric sclerae. Lungs: CTA Bilaterally. No Wheezing/Rhonchi/Rales. Heart:  S1 S2, Irregular,  No murmur, No Rubs, No Gallops Abdomen: Soft, Non distended, Non tender.  +Bowel sounds, Extremities: No c/c/e Psych:   Not anxious or agitated. Neurologic:  No acute neurological deficit. Vital signs/Intake and Output: 
Visit Vitals /87 (BP 1 Location: Left arm, BP Patient Position: At rest) Pulse 84 Temp 97.4 °F (36.3 °C) Resp 16 Ht 5' 7\" (1.702 m) Wt 84.9 kg (187 lb 2.7 oz) SpO2 100% Breastfeeding? No  
BMI 29.32 kg/m² Current Shift:  No intake/output data recorded. Last three shifts:  04/11 1901 - 04/13 0700 In: 480 [P.O.:480] Out: 1975 [[facility]:3726] Labs: Results:  
   
Chemistry Recent Labs 04/13/19 0315 04/12/19 
1820 * 131* * 146*  
K 4.0 4.6 * 112* CO2 28 27 BUN 21* 23* CREA 0.95 1.03  
CA 9.0 8.9 AGAP 9 7 BUCR 22* 22* AP 89 97  
TP 6.2* 6.7 ALB 3.0* 3.1*  
GLOB 3.2 3.6 AGRAT 0.9 0.9  
  
CBC w/Diff Recent Labs 04/13/19 0315 04/12/19 
1820 WBC 2.0* 2.8*  
RBC 4.17* 4.29 HGB 11.8* 12.3 HCT 38.2 39.2 * 131* GRANS  --  56  
LYMPH  --  26 EOS  --  1 Cardiac Enzymes Recent Labs 04/13/19 
0915 04/13/19 0315 CPK 54 57 CKND1 4.3* 3.7 Coagulation No results for input(s): PTP, INR, APTT in the last 72 hours. No lab exists for component: INREXT Lipid Panel Lab Results Component Value Date/Time Cholesterol, total 140 04/06/2011 03:50 AM  
 HDL Cholesterol 64 (H) 04/06/2011 03:50 AM  
 LDL, calculated 70.4 04/06/2011 03:50 AM  
 VLDL, calculated 5.6 04/06/2011 03:50 AM  
 Triglyceride 28 04/06/2011 03:50 AM  
 CHOL/HDL Ratio 2.2 04/06/2011 03:50 AM  
  
BNP No results for input(s): BNPP in the last 72 hours. Liver Enzymes Recent Labs 04/13/19 0315 TP 6.2* ALB 3.0* AP 89 SGOT 25 Thyroid Studies Lab Results Component Value Date/Time TSH 0.78 04/06/2011 03:50 AM  
    
Procedures/imaging: see electronic medical records for all procedures/Xrays and details which were not copied into this note but were reviewed prior to creation of Plan

## 2019-04-13 NOTE — ROUTINE PROCESS
Bedside shift change report given to [address] [place] [address] Rubens (oncoming nurse) by Una Lesches RN (offgoing nurse). Report included the following information SBAR, Kardex, Intake/Output and MAR.

## 2019-04-14 ENCOUNTER — APPOINTMENT (OUTPATIENT)
Dept: GENERAL RADIOLOGY | Age: 84
DRG: 292 | End: 2019-04-14
Attending: INTERNAL MEDICINE
Payer: MEDICARE

## 2019-04-14 ENCOUNTER — APPOINTMENT (OUTPATIENT)
Dept: NON INVASIVE DIAGNOSTICS | Age: 84
DRG: 292 | End: 2019-04-14
Attending: INTERNAL MEDICINE
Payer: MEDICARE

## 2019-04-14 LAB
ALBUMIN SERPL-MCNC: 2.8 G/DL (ref 3.4–5)
ALBUMIN/GLOB SERPL: 0.9 {RATIO} (ref 0.8–1.7)
ALP SERPL-CCNC: 89 U/L (ref 45–117)
ALT SERPL-CCNC: 19 U/L (ref 13–56)
ANION GAP SERPL CALC-SCNC: 7 MMOL/L (ref 3–18)
AST SERPL-CCNC: 24 U/L (ref 15–37)
BILIRUB SERPL-MCNC: 1.8 MG/DL (ref 0.2–1)
BUN SERPL-MCNC: 20 MG/DL (ref 7–18)
BUN/CREAT SERPL: 22 (ref 12–20)
CALCIUM SERPL-MCNC: 8.4 MG/DL (ref 8.5–10.1)
CHLORIDE SERPL-SCNC: 108 MMOL/L (ref 100–108)
CO2 SERPL-SCNC: 31 MMOL/L (ref 21–32)
CREAT SERPL-MCNC: 0.92 MG/DL (ref 0.6–1.3)
ERYTHROCYTE [DISTWIDTH] IN BLOOD BY AUTOMATED COUNT: 17.1 % (ref 11.6–14.5)
GLOBULIN SER CALC-MCNC: 3.2 G/DL (ref 2–4)
GLUCOSE BLD STRIP.AUTO-MCNC: 126 MG/DL (ref 70–110)
GLUCOSE BLD STRIP.AUTO-MCNC: 149 MG/DL (ref 70–110)
GLUCOSE BLD STRIP.AUTO-MCNC: 153 MG/DL (ref 70–110)
GLUCOSE BLD STRIP.AUTO-MCNC: 84 MG/DL (ref 70–110)
GLUCOSE SERPL-MCNC: 92 MG/DL (ref 74–99)
HCT VFR BLD AUTO: 40.2 % (ref 35–45)
HGB BLD-MCNC: 12.5 G/DL (ref 12–16)
MAGNESIUM SERPL-MCNC: 2 MG/DL (ref 1.6–2.6)
MCH RBC QN AUTO: 28.3 PG (ref 24–34)
MCHC RBC AUTO-ENTMCNC: 31.1 G/DL (ref 31–37)
MCV RBC AUTO: 91.2 FL (ref 74–97)
PLATELET # BLD AUTO: 117 K/UL (ref 135–420)
PMV BLD AUTO: 11.5 FL (ref 9.2–11.8)
POTASSIUM SERPL-SCNC: 3.2 MMOL/L (ref 3.5–5.5)
PROT SERPL-MCNC: 6 G/DL (ref 6.4–8.2)
RBC # BLD AUTO: 4.41 M/UL (ref 4.2–5.3)
SODIUM SERPL-SCNC: 146 MMOL/L (ref 136–145)
WBC # BLD AUTO: 2 K/UL (ref 4.6–13.2)

## 2019-04-14 PROCEDURE — 74011250637 HC RX REV CODE- 250/637: Performed by: HOSPITALIST

## 2019-04-14 PROCEDURE — 65660000000 HC RM CCU STEPDOWN

## 2019-04-14 PROCEDURE — 83735 ASSAY OF MAGNESIUM: CPT

## 2019-04-14 PROCEDURE — 80053 COMPREHEN METABOLIC PANEL: CPT

## 2019-04-14 PROCEDURE — 97116 GAIT TRAINING THERAPY: CPT

## 2019-04-14 PROCEDURE — 74011636637 HC RX REV CODE- 636/637: Performed by: INTERNAL MEDICINE

## 2019-04-14 PROCEDURE — 74011250636 HC RX REV CODE- 250/636: Performed by: INTERNAL MEDICINE

## 2019-04-14 PROCEDURE — 36415 COLL VENOUS BLD VENIPUNCTURE: CPT

## 2019-04-14 PROCEDURE — 85027 COMPLETE CBC AUTOMATED: CPT

## 2019-04-14 PROCEDURE — 71045 X-RAY EXAM CHEST 1 VIEW: CPT

## 2019-04-14 PROCEDURE — 82962 GLUCOSE BLOOD TEST: CPT

## 2019-04-14 RX ORDER — LISINOPRIL 20 MG/1
20 TABLET ORAL DAILY
Status: DISCONTINUED | OUTPATIENT
Start: 2019-04-14 | End: 2019-04-15

## 2019-04-14 RX ADMIN — FUROSEMIDE 40 MG: 10 INJECTION, SOLUTION INTRAMUSCULAR; INTRAVENOUS at 21:52

## 2019-04-14 RX ADMIN — HEPARIN SODIUM 5000 UNITS: 5000 INJECTION INTRAVENOUS; SUBCUTANEOUS at 12:06

## 2019-04-14 RX ADMIN — CARVEDILOL 25 MG: 12.5 TABLET, FILM COATED ORAL at 09:22

## 2019-04-14 RX ADMIN — CARVEDILOL 25 MG: 12.5 TABLET, FILM COATED ORAL at 17:35

## 2019-04-14 RX ADMIN — FUROSEMIDE 40 MG: 10 INJECTION, SOLUTION INTRAMUSCULAR; INTRAVENOUS at 09:22

## 2019-04-14 RX ADMIN — HEPARIN SODIUM 5000 UNITS: 5000 INJECTION INTRAVENOUS; SUBCUTANEOUS at 18:55

## 2019-04-14 RX ADMIN — INSULIN LISPRO 2 UNITS: 100 INJECTION, SOLUTION INTRAVENOUS; SUBCUTANEOUS at 12:07

## 2019-04-14 RX ADMIN — HEPARIN SODIUM 5000 UNITS: 5000 INJECTION INTRAVENOUS; SUBCUTANEOUS at 05:10

## 2019-04-14 NOTE — PROGRESS NOTES
Problem: Mobility Impaired (Adult and Pediatric) Goal: *Acute Goals and Plan of Care (Insert Text) Description In 1-7 days pt will be able to perform: ST.  Bed mobility:  Rolling L to R to L modified independent for positioning. 2.  Supine to sit to supine S with HR for meals. 3.  Sit to stand to sit SBA with RW in prep for ambulation. LT.  Gait:  Ambulate >150ft S with RW, WBAT, for home/community mobility. 2.  Activity tolerance: Tolerate up in chair 1-2 hours for ADL?s. 
3.  Patient/Family Education:  Patient/family to be independent with HEP for follow-up care and safe discharge. Outcome: Progressing Towards Goal 
 PHYSICAL THERAPY TREATMENT Patient: Luci Coyle (80 y.o. female) Date: 2019 Diagnosis: Pleural effusion [J90] CHF (congestive heart failure) ([facility] [address] [I50.9] Chronic atrial fibrillation (HCC) [I48.2] CHF (congestive heart failure) ([address] [I50.9] Pleural effusion [J90] Elevated troponin [R74.8] Facial droop [R29.810] CHF (congestive heart failure) ([address] Precautions: Fall Chart, physical therapy assessment, plan of care and goals were reviewed. ASSESSMENT: 
Pt supine in bed, NAD, family x8 present, willing to participate. Pt completed bed mob with CGA to sit EOB. Pt tolerated increased amb distance today with decreased step clearance and lissette. Min VC for RW neg with min carryover. Pt returned to sitting EOB, daughter present, NAD. Progression toward goals: 
?      Improving appropriately and progressing toward goals ? Improving slowly and progressing toward goals ? Not making progress toward goals and plan of care will be adjusted PLAN: 
Patient continues to benefit from skilled intervention to address the above impairments. Continue treatment per established plan of care. Discharge Recommendations:  Rehab Further Equipment Recommendations for Discharge:  N/A  
 
SUBJECTIVE:  
Patient stated ? Oh okay. ? OBJECTIVE DATA SUMMARY:  
Critical Behavior: 
Neurologic State: Alert Orientation Level: Oriented X4 Cognition: Follows commands Safety/Judgement: Awareness of environment Functional Mobility Training: 
Bed Mobility: 
Supine to Sit: Contact guard assistance Sit to Supine: Contact guard assistance Scooting: Contact guard assistance Transfers: 
Sit to Stand: Minimum assistance Stand to Sit: Contact guard assistance Balance: 
Sitting: Intact Standing: Intact; With support Ambulation/Gait Training: 
Distance (ft): 120 Feet (ft) Assistive Device: Gait belt;Walker, rolling Ambulation - Level of Assistance: Contact guard assistance Gait Abnormalities: Antalgic;Decreased step clearance;Hip Hike Base of Support: Widened Speed/Berenice: Slow;Shuffled;Pace decreased (<100 feet/min) Step Length: Right shortened;Left shortened Interventions: Tactile cues; Verbal cues Pain: 
Pain Scale 1: Numeric (0 - 10) Pain Intensity 1: 0 Activity Tolerance:  
good Please refer to the flowsheet for vital signs taken during this treatment. After treatment:  
? Patient left in no apparent distress sitting up in chair ? Patient left in no apparent distress sitting edge of bed 
? Call bell left within reach ? Nursing notified ? Daughter present ? Bed alarm activated [place] Time Calculation: 23 mins

## 2019-04-14 NOTE — PROGRESS NOTES
Hospitalist Progress Note Patient: Martina Byrd MRN: [id number]  CSN: [id number] YOB: 1926  Age: 80 y.o. Sex: female DOA: 4/12/2019 LOS:  LOS: 2 days Assessment/Plan Patient Active Problem List  
Diagnosis Code  Ulcer of right pretibial region, with fat layer exposed ([facility] [address] L97.812  
 CHF (congestive heart failure) (HCC) I50.9  Pleural effusion J90  Chronic atrial fibrillation (HCC) I48.2  Diabetes mellitus type 2, diet-controlled ([facility] [address] E11.9  Facial droop R29.810  Elevated troponin R74.8 81 yo female with history of breast ca, A-fib, HTN admitted for worsening LE swelling. Denies any complains. No SOB 
 
CHF - continue with diuresis, Last echo 12/2017 with EF of 50% Follow echo Pleural effusion - continue with diuresis, compensated respiratory status. A-fib - rate controlled with coreg. Not on anticoagulation due to GI bleed. HTN - uncontrolled, add lisinopril DM - continue with SSI. troponin's flat. Hypernatremia - encourage free water intake. Concern for facial droop - CT head with no acute findings. Showed Age-indeterminate left parieto-occipital cortical infarct, and small high left frontal region infarct. 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 Age-indeterminate right thalamic lacunar infarct PT/OT 
 
DVT prophylaxis with heparin Disposition : 2-3 days Review of systems General: No fevers or chills. Cardiovascular: No chest pain or pressure. No palpitations. Pulmonary: No shortness of breath. Gastrointestinal: No nausea, vomiting. Physical Exam: 
General: Awake, cooperative, no acute distress   
HEENT: NC, Atraumatic. PERRLA, anicteric sclerae. Lungs: CTA Bilaterally. No Wheezing/Rhonchi/Rales. Heart:  S1 S2, Irregular,  No murmur, No Rubs, No Gallops Abdomen: Soft, Non distended, Non tender.  +Bowel sounds, Extremities: No c/c/e Psych:   Not anxious or agitated. Neurologic:  No acute neurological deficit. Vital signs/Intake and Output: 
Visit Vitals /84 Pulse 76 Temp 97.2 °F (36.2 °C) Resp 16 Ht 5' 7\" (1.702 m) Wt 82.4 kg (181 lb 10.5 oz) SpO2 100% Breastfeeding? No  
BMI 28.45 kg/m² Current Shift:  04/14 0701 - 04/14 1900 In: 120 [P.O.:120] Out: - Last three shifts:  04/12 1901 - 04/14 0700 In: 720 [P.O.:720] Out: 9508 [Urine:2725] Labs: Results:  
   
Chemistry Recent Labs 04/14/19 0445 04/13/19 0315 04/12/19 
1820 GLU 92 127* 131* * 147* 146*  
K 3.2* 4.0 4.6  110* 112* CO2 31 28 27 BUN 20* 21* 23* CREA 0.92 0.95 1.03  
CA 8.4* 9.0 8.9 AGAP 7 9 7 BUCR 22* 22* 22* AP 89 89 97  
TP 6.0* 6.2* 6.7 ALB 2.8* 3.0* 3.1*  
GLOB 3.2 3.2 3.6 AGRAT 0.9 0.9 0.9  
  
CBC w/Diff Recent Labs 04/14/19 0445 04/13/19 0315 04/12/19 
1820 WBC 2.0* 2.0* 2.8*  
RBC 4.41 4.17* 4.29 HGB 12.5 11.8* 12.3 HCT 40.2 38.2 39.2 * 112* 131* GRANS  --   --  56  
LYMPH  --   --  26 EOS  --   --  1 Cardiac Enzymes Recent Labs 04/13/19 
1547 04/13/19 
0915 CPK 54 54 CKND1 4.1* 4.3* Coagulation No results for input(s): PTP, INR, APTT in the last 72 hours. No lab exists for component: INREXT, INREXT Lipid Panel Lab Results Component Value Date/Time Cholesterol, total 140 04/06/2011 03:50 AM  
 HDL Cholesterol 64 (H) 04/06/2011 03:50 AM  
 LDL, calculated 70.4 04/06/2011 03:50 AM  
 VLDL, calculated 5.6 04/06/2011 03:50 AM  
 Triglyceride 28 04/06/2011 03:50 AM  
 CHOL/HDL Ratio 2.2 04/06/2011 03:50 AM  
  
BNP No results for input(s): BNPP in the last 72 hours. Liver Enzymes Recent Labs 04/14/19 
0445  
TP 6.0* ALB 2.8* AP 89 SGOT 24 Thyroid Studies Lab Results Component Value Date/Time  TSH 0.78 04/06/2011 03:50 AM  
    
 Procedures/imaging: see electronic medical records for all procedures/Xrays and details which were not copied into this note but were reviewed prior to creation of Plan

## 2019-04-14 NOTE — PROGRESS NOTES
Problem: Falls - Risk of 
Goal: *Absence of Falls Description Document Lynne Slade Fall Risk and appropriate interventions in the flowsheet. Outcome: Progressing Towards Goal 
  
Problem: Patient Education: Go to Patient Education Activity Goal: Patient/Family Education Outcome: Progressing Towards Goal 
  
Problem: Diabetes Self-Management Goal: *Disease process and treatment process Description Define diabetes and identify own type of diabetes; list 3 options for treating diabetes. Outcome: Progressing Towards Goal 
Goal: *Incorporating nutritional management into lifestyle Description Describe effect of type, amount and timing of food on blood glucose; list 3 methods for planning meals. Outcome: Progressing Towards Goal 
Goal: *Incorporating physical activity into lifestyle Description State effect of exercise on blood glucose levels. Outcome: Progressing Towards Goal 
Goal: *Developing strategies to promote health/change behavior Description Define the ABC's of diabetes; identify appropriate screenings, schedule and personal plan for screenings. Outcome: Progressing Towards Goal 
Goal: *Using medications safely Description State effect of diabetes medications on diabetes; name diabetes medication taking, action and side effects. Outcome: Progressing Towards Goal 
Goal: *Monitoring blood glucose, interpreting and using results Description Identify recommended blood glucose targets  and personal targets. Outcome: Progressing Towards Goal 
Goal: *Prevention, detection, treatment of acute complications Description List symptoms of hyper- and hypoglycemia; describe how to treat low blood sugar and actions for lowering  high blood glucose level. Outcome: Progressing Towards Goal 
Goal: *Prevention, detection and treatment of chronic complications Description Define the natural course of diabetes and describe the relationship of blood glucose levels to long term complications of diabetes. Outcome: Progressing Towards Goal 
Goal: *Developing strategies to address psychosocial issues Description Describe feelings about living with diabetes; identify support needed and support network Outcome: Progressing Towards Goal 
Goal: *Insulin pump training Outcome: Progressing Towards Goal 
Goal: *Sick day guidelines Outcome: Progressing Towards Goal 
Goal: *Patient Specific Goal (EDIT GOAL, INSERT TEXT) Outcome: Progressing Towards Goal 
  
Problem: Patient Education: Go to Patient Education Activity Goal: Patient/Family Education Outcome: Progressing Towards Goal 
  
Problem: Patient Education: Go to Patient Education Activity Goal: Patient/Family Education Outcome: Progressing Towards Goal

## 2019-04-14 NOTE — PROGRESS NOTES
[address] for patient from Ken De La Rosa RN 
 
0900 Patient gown and linen changed Bedside shift change report given to Evelyne Hill RN (oncoming nurse) by Garfield Gaston RN (offgoing nurse). Report included the following information SBAR, Kardex and MAR.

## 2019-04-14 NOTE — PROGRESS NOTES
Problem: Falls - Risk of 
Goal: *Absence of Falls Description Document Royer Myers Fall Risk and appropriate interventions in the flowsheet. Outcome: Progressing Towards Goal 
  
Problem: Patient Education: Go to Patient Education Activity Goal: Patient/Family Education Outcome: Progressing Towards Goal 
  
Problem: Patient Education: Go to Patient Education Activity Goal: Patient/Family Education Outcome: Progressing Towards Goal 
  
Problem: Patient Education: Go to Patient Education Activity Goal: Patient/Family Education Outcome: Progressing Towards Goal

## 2019-04-14 NOTE — PROGRESS NOTES
1915 Pt received from offgoing nurse without any signs or symptoms of distress. Pt vitals are stable and within normal limits. Pt bed in low position with wheels locked and call bell within reach. 1925 notified by monitor tech pt [place] of [address]. Pt asymptomatic. 
1941 Assessment completed and documented in flow sheet. Pt denies any further needs at this time. Pt in NAD with bed in low position, wheels locked and call bell within reach. Purposeful rounding completed. Pt resting quietly. No further needs voiced at this time. 2000 page out to Dr Tyler Roque to update on pt incident. Return call received. Updated on event that happened on monitor. No new orders at this time. Continue to monitor pt. 
2208 Scheduled medications administered as ordered. Purposeful rounding completed. Pt resting quietly. No further needs voiced at this time. 2350 Reassessment completed with no changes noted. Bed locked, in lowest position, with call light within reach. Purposeful rounding completed. Pt resting quietly. No further needs voiced at this time. 0230 Purposeful rounding completed. Pt resting quietly. No further needs voiced at this time. 2994 Reassessment completed with no changes noted. Bed locked, in lowest position, with call light within reach. Purposeful rounding completed. Pt resting quietly. No further needs voiced at this time. [id number] Bedside and Verbal shift change report given to BRITTANY Butts RN (oncoming nurse) by Hipolito Schlatter RN (offgoing nurse). Report included the following information SBAR, Intake/Output, MAR and Recent Results.

## 2019-04-15 ENCOUNTER — APPOINTMENT (OUTPATIENT)
Dept: NON INVASIVE DIAGNOSTICS | Age: 84
DRG: 292 | End: 2019-04-15
Attending: INTERNAL MEDICINE
Payer: MEDICARE

## 2019-04-15 ENCOUNTER — APPOINTMENT (OUTPATIENT)
Dept: VASCULAR SURGERY | Age: 84
DRG: 292 | End: 2019-04-15
Attending: INTERNAL MEDICINE
Payer: MEDICARE

## 2019-04-15 PROBLEM — I50.33 DIASTOLIC CHF, ACUTE ON CHRONIC (HCC): Status: ACTIVE | Noted: 2019-04-15

## 2019-04-15 PROBLEM — Z86.73 HISTORY OF STROKE: Status: ACTIVE | Noted: 2019-04-15

## 2019-04-15 LAB
ALBUMIN SERPL-MCNC: 2.8 G/DL (ref 3.4–5)
ALBUMIN/GLOB SERPL: 0.9 {RATIO} (ref 0.8–1.7)
ALP SERPL-CCNC: 92 U/L (ref 45–117)
ALT SERPL-CCNC: 19 U/L (ref 13–56)
ANION GAP SERPL CALC-SCNC: 9 MMOL/L (ref 3–18)
AST SERPL-CCNC: 26 U/L (ref 15–37)
BILIRUB SERPL-MCNC: 1.6 MG/DL (ref 0.2–1)
BUN SERPL-MCNC: 19 MG/DL (ref 7–18)
BUN/CREAT SERPL: 22 (ref 12–20)
CALCIUM SERPL-MCNC: 8.7 MG/DL (ref 8.5–10.1)
CHLORIDE SERPL-SCNC: 103 MMOL/L (ref 100–108)
CO2 SERPL-SCNC: 32 MMOL/L (ref 21–32)
CREAT SERPL-MCNC: 0.88 MG/DL (ref 0.6–1.3)
ECHO AO ASC DIAM: 3.64 CM
ECHO AO ROOT DIAM: 3.18 CM
ECHO AV AREA PEAK VELOCITY: 1 CM2
ECHO AV AREA VTI: 1.1 CM2
ECHO AV AREA/BSA PEAK VELOCITY: 0.5 CM2/M2
ECHO AV AREA/BSA VTI: 0.6 CM2/M2
ECHO AV MEAN GRADIENT: 4.9 MMHG
ECHO AV PEAK GRADIENT: 10.4 MMHG
ECHO AV PEAK VELOCITY: 161.41 CM/S
ECHO AV VTI: 27.97 CM
ECHO IVC PROX: 1.7 CM
ECHO LA MAJOR AXIS: 4.35 CM
ECHO LA VOL 2C: 111.5 ML (ref 22–52)
ECHO LA VOL 4C: 62.82 ML (ref 22–52)
ECHO LA VOL BP: 90.17 ML (ref 22–52)
ECHO LA VOL/BSA BIPLANE: 45.89 ML/M2 (ref 16–28)
ECHO LA VOLUME INDEX A2C: 56.74 ML/M2 (ref 16–28)
ECHO LA VOLUME INDEX A4C: 31.97 ML/M2 (ref 16–28)
ECHO LV E' LATERAL VELOCITY: 8 CM/S
ECHO LV E' SEPTAL VELOCITY: 5 CM/S
ECHO LV EDV A2C: 65.6 ML
ECHO LV EDV A4C: 48.2 ML
ECHO LV EDV BP: 57.6 ML (ref 56–104)
ECHO LV EDV INDEX A4C: 24.5 ML/M2
ECHO LV EDV INDEX BP: 29.3 ML/M2
ECHO LV EDV NDEX A2C: 33.4 ML/M2
ECHO LV EJECTION FRACTION A2C: 67 %
ECHO LV EJECTION FRACTION A4C: 51 %
ECHO LV EJECTION FRACTION BIPLANE: 57.7 % (ref 55–100)
ECHO LV ESV A2C: 21.8 ML
ECHO LV ESV A4C: 23.7 ML
ECHO LV ESV BP: 24.4 ML (ref 19–49)
ECHO LV ESV INDEX A2C: 11.1 ML/M2
ECHO LV ESV INDEX A4C: 12.1 ML/M2
ECHO LV ESV INDEX BP: 12.4 ML/M2
ECHO LV INTERNAL DIMENSION DIASTOLIC: 4.83 CM (ref 3.9–5.3)
ECHO LV INTERNAL DIMENSION SYSTOLIC: 3.81 CM
ECHO LV IVSD: 1.08 CM (ref 0.6–0.9)
ECHO LV MASS 2D: 227.7 G (ref 67–162)
ECHO LV MASS INDEX 2D: 115.9 G/M2 (ref 43–95)
ECHO LV POSTERIOR WALL DIASTOLIC: 1.1 CM (ref 0.6–0.9)
ECHO LVOT DIAM: 1.98 CM
ECHO LVOT PEAK GRADIENT: 1.2 MMHG
ECHO LVOT PEAK VELOCITY: 54.62 CM/S
ECHO LVOT VTI: 10.16 CM
ECHO MV A VELOCITY: 2.26 CM/S
ECHO MV AREA PHT: 2.6 CM2
ECHO MV E DECELERATION TIME (DT): 294.3 MS
ECHO MV E POINT SEPTAL SEPARATION (EPSS): 11.1 CM
ECHO MV E VELOCITY: 93.93 CM/S
ECHO MV E/A RATIO: 41.56
ECHO MV E/E' LATERAL: 11.74
ECHO MV E/E' RATIO (AVERAGED): 15.26
ECHO MV E/E' SEPTAL: 18.79
ECHO MV PRESSURE HALF TIME (PHT): 85.3 MS
ECHO MV REGURGITANT PEAK GRADIENT: 79.8 MMHG
ECHO MV REGURGITANT PEAK VELOCITY: 446.59 CM/S
ECHO PULMONARY ARTERY SYSTOLIC PRESSURE (PASP): 40 MMHG
ECHO PV REGURGITANT MAX VELOCITY: 190.01 CM/S
ECHO RA AREA 4C: 26.87 CM2
ECHO TRICUSPID ANNULAR PEAK SYSTOLIC VELOCITY: 1.1 CM/S
ECHO TV REGURGITANT MAX VELOCITY: 272.66 CM/S
ECHO TV REGURGITANT PEAK GRADIENT: 29.7 MMHG
ERYTHROCYTE [DISTWIDTH] IN BLOOD BY AUTOMATED COUNT: 16.9 % (ref 11.6–14.5)
GLOBULIN SER CALC-MCNC: 3 G/DL (ref 2–4)
GLUCOSE BLD STRIP.AUTO-MCNC: 105 MG/DL (ref 70–110)
GLUCOSE BLD STRIP.AUTO-MCNC: 114 MG/DL (ref 70–110)
GLUCOSE BLD STRIP.AUTO-MCNC: 134 MG/DL (ref 70–110)
GLUCOSE BLD STRIP.AUTO-MCNC: 149 MG/DL (ref 70–110)
GLUCOSE SERPL-MCNC: 111 MG/DL (ref 74–99)
HCT VFR BLD AUTO: 38.2 % (ref 35–45)
HGB BLD-MCNC: 12.1 G/DL (ref 12–16)
MAGNESIUM SERPL-MCNC: 1.9 MG/DL (ref 1.6–2.6)
MCH RBC QN AUTO: 28.5 PG (ref 24–34)
MCHC RBC AUTO-ENTMCNC: 31.7 G/DL (ref 31–37)
MCV RBC AUTO: 90.1 FL (ref 74–97)
PLATELET # BLD AUTO: 123 K/UL (ref 135–420)
PMV BLD AUTO: 11.3 FL (ref 9.2–11.8)
POTASSIUM SERPL-SCNC: 3.3 MMOL/L (ref 3.5–5.5)
PROT SERPL-MCNC: 5.8 G/DL (ref 6.4–8.2)
RBC # BLD AUTO: 4.24 M/UL (ref 4.2–5.3)
SODIUM SERPL-SCNC: 144 MMOL/L (ref 136–145)
WBC # BLD AUTO: 1.9 K/UL (ref 4.6–13.2)

## 2019-04-15 PROCEDURE — 82962 GLUCOSE BLOOD TEST: CPT

## 2019-04-15 PROCEDURE — 74011250637 HC RX REV CODE- 250/637: Performed by: HOSPITALIST

## 2019-04-15 PROCEDURE — 93306 TTE W/DOPPLER COMPLETE: CPT

## 2019-04-15 PROCEDURE — 29581 APPL MULTLAYER CMPRN SYS LEG: CPT

## 2019-04-15 PROCEDURE — 85027 COMPLETE CBC AUTOMATED: CPT

## 2019-04-15 PROCEDURE — 93970 EXTREMITY STUDY: CPT

## 2019-04-15 PROCEDURE — 74011250636 HC RX REV CODE- 250/636: Performed by: HOSPITALIST

## 2019-04-15 PROCEDURE — 36415 COLL VENOUS BLD VENIPUNCTURE: CPT

## 2019-04-15 PROCEDURE — 74011250636 HC RX REV CODE- 250/636: Performed by: INTERNAL MEDICINE

## 2019-04-15 PROCEDURE — 97167 OT EVAL HIGH COMPLEX 60 MIN: CPT

## 2019-04-15 PROCEDURE — 83735 ASSAY OF MAGNESIUM: CPT

## 2019-04-15 PROCEDURE — 80053 COMPREHEN METABOLIC PANEL: CPT

## 2019-04-15 PROCEDURE — 97535 SELF CARE MNGMENT TRAINING: CPT

## 2019-04-15 PROCEDURE — 97530 THERAPEUTIC ACTIVITIES: CPT

## 2019-04-15 PROCEDURE — 65660000000 HC RM CCU STEPDOWN

## 2019-04-15 PROCEDURE — 97116 GAIT TRAINING THERAPY: CPT

## 2019-04-15 RX ORDER — FUROSEMIDE 10 MG/ML
20 INJECTION INTRAMUSCULAR; INTRAVENOUS ONCE
Status: COMPLETED | OUTPATIENT
Start: 2019-04-15 | End: 2019-04-15

## 2019-04-15 RX ORDER — HYDRALAZINE HYDROCHLORIDE 20 MG/ML
20 INJECTION INTRAMUSCULAR; INTRAVENOUS ONCE
Status: COMPLETED | OUTPATIENT
Start: 2019-04-15 | End: 2019-04-15

## 2019-04-15 RX ORDER — ACETAMINOPHEN 325 MG/1
650 TABLET ORAL
Qty: 1 TAB | Refills: 0 | Status: SHIPPED
Start: 2019-04-15 | End: 2019-05-24

## 2019-04-15 RX ORDER — CARVEDILOL PHOSPHATE 20 MG/1
20 CAPSULE, EXTENDED RELEASE ORAL
Status: DISCONTINUED | OUTPATIENT
Start: 2019-04-16 | End: 2019-04-15 | Stop reason: CLARIF

## 2019-04-15 RX ORDER — POTASSIUM CHLORIDE 20 MEQ/1
20 TABLET, EXTENDED RELEASE ORAL 2 TIMES DAILY
Status: DISCONTINUED | OUTPATIENT
Start: 2019-04-15 | End: 2019-04-16 | Stop reason: HOSPADM

## 2019-04-15 RX ORDER — CARVEDILOL 3.12 MG/1
6.25 TABLET ORAL 2 TIMES DAILY WITH MEALS
Status: DISCONTINUED | OUTPATIENT
Start: 2019-04-15 | End: 2019-04-16 | Stop reason: HOSPADM

## 2019-04-15 RX ORDER — FUROSEMIDE 40 MG/1
40 TABLET ORAL DAILY
Status: DISCONTINUED | OUTPATIENT
Start: 2019-04-15 | End: 2019-04-16 | Stop reason: HOSPADM

## 2019-04-15 RX ADMIN — HEPARIN SODIUM 5000 UNITS: 5000 INJECTION INTRAVENOUS; SUBCUTANEOUS at 03:50

## 2019-04-15 RX ADMIN — HEPARIN SODIUM 5000 UNITS: 5000 INJECTION INTRAVENOUS; SUBCUTANEOUS at 19:31

## 2019-04-15 RX ADMIN — POTASSIUM CHLORIDE 20 MEQ: 20 TABLET, EXTENDED RELEASE ORAL at 21:50

## 2019-04-15 RX ADMIN — POTASSIUM CHLORIDE 20 MEQ: 20 TABLET, EXTENDED RELEASE ORAL at 12:15

## 2019-04-15 RX ADMIN — HYDRALAZINE HYDROCHLORIDE 20 MG: 20 INJECTION INTRAMUSCULAR; INTRAVENOUS at 04:04

## 2019-04-15 RX ADMIN — FUROSEMIDE 40 MG: 40 TABLET ORAL at 12:15

## 2019-04-15 RX ADMIN — HEPARIN SODIUM 5000 UNITS: 5000 INJECTION INTRAVENOUS; SUBCUTANEOUS at 12:15

## 2019-04-15 RX ADMIN — FUROSEMIDE 20 MG: 10 INJECTION, SOLUTION INTRAMUSCULAR; INTRAVENOUS at 19:30

## 2019-04-15 RX ADMIN — CARVEDILOL 25 MG: 12.5 TABLET, FILM COATED ORAL at 08:46

## 2019-04-15 NOTE — PROGRESS NOTES
Transition of care: anticipate rehab Met with IDR team eloina d/c tomorrow. Met with patient and her daughter they have the list of rehabs but wanted cm just to submit and let them know what facilities could accept. Cm did as requested and provided them with the 4 that could accept. Daughter states they will go look at the facilities and let cm know which one. Informed them if they choose one to please let cm know plan for d/c to snf tomorrow. Patient and daughter verbalized understanding. Care Management Interventions PCP Verified by CM: Yes Transition of Care Consult (CM Consult): SNF Partner SNF: Yes Physical Therapy Consult: Yes Occupational Therapy Consult: Yes Current Support Network: Lives Alone(lives at [facility]) Confirm Follow Up Transport: Family Plan discussed with Pt/Family/Caregiver: Yes Freedom of Choice Offered: Yes Discharge Location Discharge Placement: Skilled nursing facility

## 2019-04-15 NOTE — DIABETES MGMT
Diabetes Patient/Family Education RecordFactors That  May Influence Patients Ability  to Learn or  Comply with Recommendations []   Language barrier    []   Cultural needs   []   Motivation  
 []   Cognitive limitation    []   Physical   []   Education  
 []   Physiological factors   []   Hearing/vision/speaking impairment   []   Yazdanism beliefs []   Financial factors   []  Other:   [x]  No factors identified at this time. Person Instructed: [x]   Patient   [x]   Family: Pt's daughter    []  Other Preference for Learning: 
 [x]   Verbal   []   Written   []  Demonstration Level of Comprehension & Competence:   
[x]  Good                                      [] Fair                                     []  Poor                             [x]  Needs Reinforcement  
[x]  Teachback completed Education Component: additionally, explained inpatient mgt of hyperglycemia. [x]  Medication management, including how to administer insulin (if appropriate) and potential medication interactions [x]  Nutritional management -obtain usual meal pattern: Prepared by  at independent living facility. Pt's family also will take pt meals. []  Exercise [x]  Signs, symptoms, and treatment of hyperglycemia and hypoglycemia  
[] Prevention, recognition and treatment of hyperglycemia and hypoglycemia [x]  Importance of blood glucose monitoring and how to obtain a blood glucose meter   
[]  Instruction on use of the blood glucose meter [x]  Discuss the importance of HbA1C monitoring   
[]  Sick day guidelines  
[]  Proper use and disposal of lancets, needles, syringes or insulin pens (if appropriate) []  Potential long-term complications (retinopathy, kidney disease, neuropathy, foot care)  
[] Information about whom to contact in case of emergency or for more information   
[x]  Goal:  Patient/family will demonstrate understanding of Diabetes Self Management Skills by: 4/30/19 Plan for post-discharge education or self-management support: 
  [x] Outpatient class schedule provided            [] Patient Declined 
  [] Scheduled for outpatient classes (date) _______ Verify: 
Does patient understand how diabetes medications work? N/A. Lifestyle managed. Does patient know what their most recent A1c is? Yes Does patient monitor glucose at home? Yes, 1x daily. Does patient have difficulty obtaining diabetes medications or testing supplies? No 
  
[facility] [phone]

## 2019-04-15 NOTE — PROGRESS NOTES
Problem: Self Care Deficits Care Plan (Adult) Goal: *Acute Goals and Plan of Care (Insert Text) Description Initial Occupational Therapy Goals (4/15/2019) Within 7 day(s): 1. Patient will perform grooming standing at sink with Supervision x 2-3 minutes for increased independence with ADLs. 2. Patient will perform UB dressing with setup for increased independence with ADLs. 3. Patient will perform LB dressing with setup/Mak & A/E PRN for increased independence with ADLs. 4. Patient will perform all aspects of toileting with Supervision for increased independence in ADLs 5. Patient will independently apply energy conservation techniques with 1 verbal cue(s) for increased independence with ADLs. 6. Patient will utilize good body mechanics during ADLs with 1 verbal cue(s). Outcome: Progressing Towards Goal 
 OCCUPATIONAL THERAPY EVALUATION Patient: Fran Hull (80 y.o. female) Date: 4/15/2019 Primary Diagnosis: Pleural effusion [J90] CHF (congestive heart failure) ([facility] [address] [I50.9] Chronic atrial fibrillation (HCC) [I48.2] CHF (congestive heart failure) ([facility] [address] [I50.9] Pleural effusion [J90] Elevated troponin [R74.8] Facial droop [R29.810] Precautions:  Fall ASSESSMENT : 
Based on the objective data described below, the patient presents with decreased functional strength, decreased functional balance, decreased overall activity tolerance limiting independence with ADLs. Patient with Primitivo Razor, but reports \"it's leaking\". Bed completely soaked including gown. Pt assisted to [facility], but required significant physical assist into standing w/ bed at normal height. Pt w/ improved sit/stand w/ BSC, but requires constant cues to push up from surface & reach back for surface. Pt reports use of incontinent wear d/t being on Lasix.  Pt encouraged to have staff assist to [facility] (w/ RN/Kimberly notified) to allow for measuring while on diuretic but still increase mobility as pt required less assist on previous date w/ PT. Pt greatly limited by B knee pain w/ \"bone-on-bone\". Candid discussion w/ pt/daughter on pt's advanced age and likely not surgical candidate and need for compensatory and adaptive strategies. Pt reports BLE 's give out and she lowers herself to the ground and appears related to fatigue. Pt reports long distance to dining rosas, but close to laundry room and preference to be able to do own laundry. Pt would benefit from continued OT services to maximize independence in ADLs. Education: Patient instructed on home safety, body mechanics for optimal respiratory effort, Energy Conservation/Work Simplification Techniques, adaptive strategies and adaptive dressing techniques including clothing modifications with patient  verbalizing understanding at this time. Patient will benefit from skilled intervention to address the above impairments. Patient?s rehabilitation potential is considered to be Good Factors which may influence rehabilitation potential include: ? None noted ? Mental ability/status ? Medical condition ? Home/family situation and support systems ? Safety awareness ? Pain tolerance/management ? Other: PLAN : 
Recommendations and Planned Interventions: 
?               Self Care Training                  ? Therapeutic Activities ? Functional Mobility Training    ? Cognitive Retraining 
? Therapeutic Exercises           ? Endurance Activities ? Balance Training                   ? Neuromuscular Re-Education ? Visual/Perceptual Training     ? Home Safety Training 
? Patient Education                 ? Family Training/Education ? Other (comment): Frequency/Duration: Patient will be followed by occupational therapy 1-2 times per day/3-5 days per week to address goals. Discharge Recommendations: Rehab Further Equipment Recommendations for Discharge: To Be Determined (TBD) at next level of care  (likely will need electric lift chair; unsure if pt has cognition for power chair, but would benefit from moving to [facility] and having  w/c to be pushed to dining & activities and community outings). SUBJECTIVE:  
Patient stated ? I ease on down as gently as I can. I don't try to catch myself.? (re: when LE's give out and \"fall\") OBJECTIVE DATA SUMMARY:  
 
Past Medical History:  
Diagnosis Date A-fib ([facility] [address] Breast cancer ([facility] [address] 2014 Hypertension Menopause Past Surgical History:  
Procedure Laterality Date HX BREAST BIOPSY Left br. Benign HX HYSTERECTOMY Age 52 Barriers to Learning/Limitations: yes;  cognitive and physical 
Compensate with: visual, verbal, tactile, kinesthetic cues/model Prior Level of Function/Home Situation: Pt in ILF w/ room long distance from dining rosas; supportive daughter Home Situation Home Environment: Independent living([facility]) # Steps to Enter: 0 One/Two Story Residence: One story # of Interior Steps: 0 Height of Each Step (in): 0 inches Interior Rails: None Lift Chair Available: No 
Living Alone: Yes Support Systems: Family member(s) Patient Expects to be Discharged to[de-identified] Rehabilitation facility Current DME Used/Available at Home: Frutoso Howard, [place], Frutoso Howard, rollator Tub or Shower Type: Shower ? Right hand dominant   ? Left hand dominant Cognitive/Behavioral Status: 
Neurologic State: Alert; Appropriate for age(decreased memory) Orientation Level: Oriented to person;Oriented to place;Oriented to situation;Oriented to time Cognition: Decreased command following;Memory loss Safety/Judgement: Awareness of environment;Decreased insight into deficits Skin: appears grossly intact Edema: mild generalized edema (on Lasix) Vision/Perceptual:   
  Limited visual scanning likely d/t cognition, but grossly intact; responds to cues Coordination: BUE Coordination: Within functional limits Fine Motor Skills-Upper: Left Intact; Right Intact Gross Motor Skills-Upper: Left Intact; Right Intact Balance: 
Sitting: Intact Standing: Intact; With support Strength: BUE Strength: Generally decreased, functional 
Tone & Sensation: BUE Tone: Normal 
Sensation: Intact Range of Motion: BUE 
AROM: Generally decreased, functional 
Functional Mobility and Transfers for ADLs: 
Bed Mobility: 
Supine to Sit: Contact guard assistance; Additional time; Adaptive equipment Sit to Supine: Contact guard assistance; Adaptive equipment; Additional time Scooting: Contact guard assistance Transfers: 
Sit to Stand: Moderate assistance(min w/ bed elevated) Bed to Chair: Contact guard assistance;Minimum assistance; Adaptive equipment ADL Assessment:  
Feeding: Setup Oral Facial Hygiene/Grooming: Setup; Additional time(seated) Bathing: Moderate assistance Upper Body Dressing: Minimum assistance Lower Body Dressing: Moderate assistance;Maximum assistance; Additional time Toileting: Minimum assistance; Additional time ADL Intervention: 
Feeding Drink to Mouth: Supervision/set-up Grooming Washing Hands: Supervision/set-up(seated with wipes) Upper Body Dressing Assistance Hospital Gown: Minimum  assistance Lower Body Dressing Assistance Underpants: Moderate assistance Socks: Maximum assistance; Total assistance (dependent) Toileting Toileting Assistance: Moderate assistance Bladder Hygiene: Contact guard assistance Bowel Hygiene: Contact guard assistance Clothing Management: Moderate assistance Cues: Tactile cues provided;Verbal cues provided;Visual cues provided;Physical assistance for pants down;Physical assistance for pants up Adaptive Equipment: Walker(BSC) Cognitive Retraining Orientation Retraining: Reorienting;Time Problem Solving: Inductive reason; Identifying the task; Identifying the problem;General alternative solution;Deductive reason; Awareness of environment Executive Functions: Executing cognitive plans Organizing/Sequencing: Breaking task down;Prioritizing Attention to Task: Distractibility; Single task Following Commands: Awareness of environment; Follows one step commands/directions Safety/Judgement: Awareness of environment;Decreased insight into deficits Cues: Verbal cues provided; Tactile cues provided;Visual cues provided Pain: 
Pre-treatment: 3/10 Post-treatment: 3/10 Activity Tolerance:  
Patient able to stand ~1 minute(s). Patient able to complete ADLs with frequent rest breaks. Patient limited by B knee pain (chronic), decreased memory, strength, balance. Patient unsteady Please refer to the flowsheet for vital signs taken during this treatment. After treatment:  
? Patient left in no apparent distress sitting up in chair ? Patient left in no apparent distress in bed/chair position ? Call bell left within reach ? Nursing notified/Kimberly ? Caregiver present/daughter ? Bed alarm activated COMMUNICATION/EDUCATION:  
? Home safety education was provided and the patient/caregiver indicated understanding. ? Patient/family have participated as able in goal setting and plan of care. ? Patient/family agree to work toward stated goals and plan of care. ? Patient understands intent and goals of therapy, but is neutral about his/her participation. ? Patient is unable to participate in goal setting and plan of care. Thank you for this referral. 
Margaux Barrett, OTR/L Time Calculation: 53 mins Eval Complexity: History: HIGH Complexity : Extensive review of history including physical, cognitive and psychosocial history ;   
Examination: HIGH Complexity : 5 or more performance deficits relating to physical, cognitive , or psychosocial skils that result in activity limitations and / or participation restrictions; Decision Making:HIGH Complexity : Patient presents with comorbidities that affect occupational performance. Signifigant modification of tasks or assistance (eg, physical or verbal) with assessment (s) is necessary to enable patient to complete evaluation

## 2019-04-15 NOTE — PROGRESS NOTES
Problem: Mobility Impaired (Adult and Pediatric) Goal: *Acute Goals and Plan of Care (Insert Text) Description In 1-7 days pt will be able to perform: ST.  Bed mobility:  Rolling L to R to L modified independent for positioning. 2.  Supine to sit to supine S with HR for meals. 3.  Sit to stand to sit SBA with RW in prep for ambulation. LT.  Gait:  Ambulate >150ft S with RW, WBAT, for home/community mobility. 2.  Activity tolerance: Tolerate up in chair 1-2 hours for ADL?s. 
3.  Patient/Family Education:  Patient/family to be independent with HEP for follow-up care and safe discharge. Outcome: Progressing Towards Goal 
 
PHYSICAL THERAPY TREATMENT Patient: Elizabeth Knapp (80 y.o. female) Date: 4/15/2019 Diagnosis: Pleural effusion [J90] CHF (congestive heart failure) ([facility] [address] [I50.9] Chronic atrial fibrillation (HCC) [I48.2] CHF (congestive heart failure) ([facility] [address] [I50.9] Pleural effusion [J90] Elevated troponin [R74.8] Facial droop [T37.761] Diastolic CHF, acute on chronic (HCC) Precautions: Fall Chart, physical therapy assessment, plan of care and goals were reviewed. ASSESSMENT: 
Pt found with IVAN Landeros present and requesting assist with pt to [facility]. Pt requires additional time and uses hand to self assist LE's off bed, but able to transition to sit EOB with supervision. Also needs additional time/min/CGA for completion of transfers sit<>stand at [address] [place] [address]. Able to void on [facility] and perform self hygiene care with SBA and continue GT/RW/CGA thereafter 68ft. Distance decreased from previous day most likely d/t fatigue from [facility] use. Berenice slow, step to, with increased LONNIE and genu valgus noted. Balance fair. Tendency for slightly forward posture, as pt looks to floor often; corrects briefly with vc.  Pt left up in chair at bedside with daughter present and all needs in reach. Advised to have staff assist for return to bed and comprehension expressed by both.   May benefit from rehab at discharge to increase LE strength, safety/independence with gt as pt lives at independent living at [facility] and reports h/o recent falls. Progression toward goals: 
?      Improving appropriately and progressing toward goals ? Improving slowly and progressing toward goals ? Not making progress toward goals and plan of care will be adjusted PLAN: 
Patient continues to benefit from skilled intervention to address the above impairments. Continue treatment per established plan of care. Discharge Recommendations:  SNF/Rehab Further Equipment Recommendations for Discharge:  N/A  
 
SUBJECTIVE:  
Patient stated ? You want me to walk huh?? 
 
OBJECTIVE DATA SUMMARY:  
Critical Behavior: 
Neurologic State: Alert, Appropriate for age(decreased memory) Orientation Level: Oriented to person, Oriented to place, Oriented to situation, Oriented to time Cognition: Decreased command following, Memory loss Safety/Judgement: Awareness of environment, Decreased insight into deficits Functional Mobility Training: 
Bed Mobility: 
Supine to Sit: Supervision; Additional time(self assist LE's with hands) Sit to Supine: Contact guard assistance; Adaptive equipment; Additional time Scooting: Contact guard assistance Transfers: 
Sit to Stand: Minimum assistance;Contact guard assistance; Additional time(bed elev) Stand to Sit: Contact guard assistance; Additional time(vc for hand placement) Bed to Chair: Contact guard assistance Balance: 
Sitting: Intact Standing: With support; Impaired Standing - Static: Good Standing - Dynamic : Fair Ambulation/Gait Training: 
Distance (ft): 66 Feet (ft) Assistive Device: Gait belt;Walker, rolling Ambulation - Level of Assistance: Minimal assistance;Contact guard assistance Gait Abnormalities: Decreased step clearance; Step to gait Base of Support: Widened(genu valgus bilat R>L) Stance: Time;Weight shift Speed/Berenice: Slow Step Length: Right shortened;Left shortened Swing Pattern: Right asymmetrical;Left asymmetrical 
Interventions: Safety awareness training;Verbal cues Pain: 
Pain Scale 1: Numeric (0 - 10) Pain Intensity 1: 0 Activity Tolerance:  
Fair Please refer to the flowsheet for vital signs taken during this treatment. After treatment:  
? Patient left in no apparent distress sitting up in chair ? Patient left in no apparent distress in bed 
? Call bell left within reach ? Nursing notified ? Caregiver present-daughter ? Bed alarm activated Shakira Meredith PT Time Calculation: 23 mins

## 2019-04-15 NOTE — PROGRESS NOTES
0208 Assumed care of the patient from 1650 [facility] (offgoing Nurse). Patient is alert and oriented X2. Has confusion. Pt denies any pain or discomfort at this moment. bed in low position, call bell within reach. 0845 Pt wound dressing had to cut off for the duplex of the leg. 1010 Called wound care regarding pt dressing status. 1035 Pt refused Lisinopril. Dr Heather Lange made aware of it. 1115 Wound care at bedside. 1700 Pt refused coreg. States she took 25 mg of coreg this am. Does not want anymore. She also states she takes  25 mg once at home. Spoke with Dr. Deacon Mayer about pt concerns. 1910 Bedside and Verbal shift change report given to Floridalma Guevara RN (oncoming nurse) by Linda Ch RN (offgoing nurse). Report included the following information SBAR, Kardex, Intake/Output, MAR, Accordion, Recent Results, Med Rec Status and Cardiac Rhythm .

## 2019-04-15 NOTE — DIABETES MGMT
NUTRITION / GLYCEMIC CONTROL PLAN OF CARE 
- known h/o T2DM, HbA1c within recommended range for age + comorbids diet and lifestyle controlled 
- recommend follow up with OP PCM DM management Diabetes Management:  
 - recommend: continue with - Humalog Normal Insulin Sensitivity Corrective Coverage 
- education: (see GC RD note) 
- goals: *BG will be in target range non-ICU: 110-180 mg/dl by 4/22 *PO intake will be at least 75% of meals offered by 4/22 *pt will follow up with OP PCM for DM management by 5/31 
- TDD: 2 units - Humalog Normal Insulin Sensitivity Corrective Coverage - BG range: 105-153 mg/dl - Hypo: no 
- BG in target range (non-ICU: <140; ICU<180): [x] Yes  [] No 
- Steroids: no 
- Intake:  
 Patient Vitals for the past 100 hrs: 
 % Diet Eaten 04/15/19 1012 50 % 04/14/19 1913 100 % 04/14/19 0922 50 % 04/13/19 1858 100 % Current Insulin regimen: - Humalog Normal Insulin Sensitivity Corrective Coverage Home medication/insulin regimen: No PTA DM medications Recent Glucose Results:  
Lab Results Component Value Date/Time  (H) 04/15/2019 03:38 AM  
 GLUCPOC 105 04/15/2019 06:14 AM  
 GLUCPOC 149 (H) 04/14/2019 09:51 PM  
 GLUCPOC 126 (H) 04/14/2019 04:07 PM  
 
 
Adequate glycemic control PTA:  [x] Yes  [] No 
 
HbA1c: equivalent  to ave BGlucose of 177 mg/dl for 2-3 months prior to admission Lab Results Component Value Date/Time Hemoglobin A1c 7.8 (H) 04/13/2019 03:15 AM  
Diet:  
Active Orders Diet DIET DIABETIC CONSISTENT CARB Regular Jakub Cage MS, RN, CDE Glycemic Control Team 
[phone] Pager [phone] (M-TH 8:00-4:30P) *After Hours pager [phone]

## 2019-04-15 NOTE — DIABETES MGMT
NUTRITIONAL ASSESSMENT GLYCEMIC CONTROL/ PLAN OF CARE Amanda Fall           80 y.o.           4/12/2019 1. Bilateral lower extremity edema 2. Pleural effusion 3. Congestive heart failure, unspecified HF chronicity, unspecified heart failure type ([facility] [address] 4. Chronic atrial fibrillation (HCC) PMHx: Type 2 Diabetes, A-fib, Breast cancer, HTN 
 
 
 INTERVENTIONS/PLAN:  
-Recommend continuing Diabetes Consistent Carbohydrate Diet  
-Provided Diabetes self-mgt Education ASSESSMENT:  
Nutritional Status: Overweight per BMI 28.1 kg/m2 (25.0-29.9 kg/m2) Chronic Leg ulcers Reports good appetite Diabetes Management:   
Recent Glucose Results:  
Lab Results Component Value Date/Time  (H) 04/15/2019 03:38 AM  
 GLUCPOC 134 (H) 04/15/2019 12:06 PM  
 GLUCPOC 105 04/15/2019 06:14 AM  
 GLUCPOC 149 (H) 04/14/2019 09:51 PM  
 
 
Within target range (non-ICU: <140; ICU<180): [x] Yes   []  No 
 
Current Insulin regimen:  
Humalog corrective normal insulin sensitivity Home medication/insulin regimen:  
None *lifestyle managed HbA1c:(4/13/19) 7.8% equivalent to average blood glucose level 177 mg/dL. Adequate glycemic control PTA:  [] Yes  [x] No 
  
 
SUBJECTIVE/OBJECTIVE: Information obtained from: Pt and pt's daughter present at time of visit. Shared current Hgb A1C w/ pt. Pt confirmed diabetes currently life style managed. Pt lives in independent living facility where meals are prepared her. Pt's family also provides meals for pt. Pt reports a good appetite. Pt SMBG 1x daily in the morning. Explained inpatient diabetes self mgt regimen to pt. Will continue to monitor and follow up per poliyc. Diet: DIET DIABETIC CONSISTENT CARB Patient Vitals for the past 100 hrs: 
 % Diet Eaten 04/15/19 1012 50 % 04/14/19 1913 100 % 04/14/19 0922 50 % 04/13/19 1858 100 % Medications: [x]                Reviewed Most Recent POC Glucose:  
Recent Labs 04/15/19 
9193 04/14/19 
0445 04/13/19 
0315 04/12/19 
1820 * 92 127* 131* Labs:  
Lab Results Component Value Date/Time Hemoglobin A1c 7.8 (H) 04/13/2019 03:15 AM  
 
Lab Results Component Value Date/Time Sodium 144 04/15/2019 03:38 AM  
 Potassium 3.3 (L) 04/15/2019 03:38 AM  
 Chloride 103 04/15/2019 03:38 AM  
 CO2 32 04/15/2019 03:38 AM  
 Anion gap 9 04/15/2019 03:38 AM  
 Glucose 111 (H) 04/15/2019 03:38 AM  
 BUN 19 (H) 04/15/2019 03:38 AM  
 Creatinine 0.88 04/15/2019 03:38 AM  
 Calcium 8.7 04/15/2019 03:38 AM  
 Magnesium 1.9 04/15/2019 03:38 AM  
 Albumin 2.8 (L) 04/15/2019 03:38 AM  
 
 
Anthropometrics:  BMI (calculated): 28.1 kg/m2 Wt Readings from Last 1 Encounters:  
04/15/19 84.8 kg (187 lb) Ht Readings from Last 1 Encounters:  
04/15/19 5' 7\" (1.702 m) Estimated Nutrition Needs:  1858  kcal/day (28 kcal/kg), 80 g PRO/day ( 1.2 g PRO/kg), 1858 mL fluid/day (1 mL fluid/  kcal) Based on:    [x]            Adjusted BW: 66.36 kg Nutrition Diagnoses: Altered nutrition-related lab value related to solely lifestyle managed DMT2 as evidenced by Hgb A1C 7.8%. Nutrition Interventions: 
-Recommend continuing Diabetic Consistent Carbohydrate Diet Goal:  
Blood glucose will be within target range of  mg/dL by 4/18/19. Nutrition Monitoring and Evaluation []     Monitor po intake on meal rounds 
[x]     Continue inpatient monitoring and intervention 
[]     Other: 
 
 
Nutrition Risk:  []   High     []  Moderate    [x]  Minimal/Uncompromised Phyllistine Bo 
pgr [phone]

## 2019-04-15 NOTE — PROGRESS NOTES
Problem: Falls - Risk of 
Goal: *Absence of Falls Description Document Tia Manus Fall Risk and appropriate interventions in the flowsheet. Outcome: Progressing Towards Goal 
  
Problem: Patient Education: Go to Patient Education Activity Goal: Patient/Family Education Outcome: Progressing Towards Goal 
  
Problem: Diabetes Self-Management Goal: *Disease process and treatment process Description Define diabetes and identify own type of diabetes; list 3 options for treating diabetes. Outcome: Progressing Towards Goal 
Goal: *Incorporating nutritional management into lifestyle Description Describe effect of type, amount and timing of food on blood glucose; list 3 methods for planning meals. Outcome: Progressing Towards Goal 
Goal: *Incorporating physical activity into lifestyle Description State effect of exercise on blood glucose levels. Outcome: Progressing Towards Goal 
Goal: *Developing strategies to promote health/change behavior Description Define the ABC's of diabetes; identify appropriate screenings, schedule and personal plan for screenings. Outcome: Progressing Towards Goal 
Goal: *Using medications safely Description State effect of diabetes medications on diabetes; name diabetes medication taking, action and side effects. Outcome: Progressing Towards Goal 
Goal: *Monitoring blood glucose, interpreting and using results Description Identify recommended blood glucose targets  and personal targets. Outcome: Progressing Towards Goal 
Goal: *Prevention, detection, treatment of acute complications Description List symptoms of hyper- and hypoglycemia; describe how to treat low blood sugar and actions for lowering  high blood glucose level. Outcome: Progressing Towards Goal 
Goal: *Prevention, detection and treatment of chronic complications Description Define the natural course of diabetes and describe the relationship of blood glucose levels to long term complications of diabetes. Outcome: Progressing Towards Goal 
Goal: *Developing strategies to address psychosocial issues Description Describe feelings about living with diabetes; identify support needed and support network Outcome: Progressing Towards Goal 
Goal: *Insulin pump training Outcome: Progressing Towards Goal 
Goal: *Sick day guidelines Outcome: Progressing Towards Goal 
Goal: *Patient Specific Goal (EDIT GOAL, INSERT TEXT) Outcome: Progressing Towards Goal 
  
Problem: Patient Education: Go to Patient Education Activity Goal: Patient/Family Education Outcome: Progressing Towards Goal 
  
Problem: Patient Education: Go to Patient Education Activity Goal: Patient/Family Education Outcome: Progressing Towards Goal 
  
Problem: Pressure Injury - Risk of 
Goal: *Prevention of pressure injury Description Document Ignacio Scale and appropriate interventions in the flowsheet. Outcome: Progressing Towards Goal 
  
Problem: Patient Education: Go to Patient Education Activity Goal: Patient/Family Education Outcome: Progressing Towards Goal

## 2019-04-15 NOTE — PROGRESS NOTES
Hospitalist Progress Note Patient: Michael Lama MRN: [id number]  CSN: [id number] YOB: 1926  Age: 80 y.o. Sex: female DOA: 4/12/2019 LOS:  LOS: 3 days Chief Complaint: SOB Assessment/Plan 81 yo AAF admitted for SOB c/w CHF, and recent falls prompting family to be concerned aboput her staying in independent apartment Hx of breast cancer, afib, not on AC due to GIB, leg swelling, and recent dysarthria Diastolic CHF-lasix daily, appears improved Chronic afib-stable rate, not on [facility] Hypomagnesemia-replete IV Hx of GIB Falls-PT consulted-she will need SNF placemant Leg edema-right is wrapped, left appears with mild edema, Duplex neg for DVT BL 
NIDDM-follow BG levels, SSI PRN, A1c in 7% range Leukopenia-repeat CBC Mild thrombocytopenia-repeat for trend History of CVA's-chronic changes c/w CVA on CT head-nothing acute PT Replete K PO for hypokalemia Placement in progress Can she take asa daily? Will ask daughter Disposition : 
Patient Active Problem List  
Diagnosis Code  Ulcer of right pretibial region, with fat layer exposed ([address] L97.812  
 CHF (congestive heart failure) (HCC) I50.9  Pleural effusion J90  Chronic atrial fibrillation (HCC) I48.2  Diabetes mellitus type 2, diet-controlled ([address] E11.9  Facial droop R29.810  Elevated troponin R74.8  Diastolic CHF, acute on chronic (HCC) I50.33 Subjective: She denies new issue No CP, SOB, HA, nausea Echo being done Review of systems: 
 
Constitutional: denies fevers Respiratory: denies cough Cardiovascular: deniespalpitations Gastrointestinal: deniesvomiting, diarrhea Vital signs/Intake and Output: 
Visit Vitals /64 Pulse (!) 55 Temp 97.5 °F (36.4 °C) Resp 16 Ht 5' 7\" (1.702 m) Wt 84.8 kg (187 lb) SpO2 100% Breastfeeding? No  
BMI 29.29 kg/m² Current Shift:  04/15 0701 - 04/15 1900 In: 120 [P.O.:120] Out: - Last three shifts:  04/13 1901 - 04/15 0700 In: 360 [P.O.:360] Out: 9779 [[id number]] Exam: 
 
General: Well developed, alert, NAD, OX3 
CVS:Regular rate and rhythm, no M/R/G, S1/S2 heard, no thrill Lungs:Clear to auscultation bilaterally, no wheezes, rhonchi, or rales Abdomen: Soft, Nontender, No distention, Normal Bowel sounds, No hepatomegaly Extremities: 2 plus edema chronic stasis edema LE BL 
Skin:normal texture and turgor, no rashes, no lesions Neuro:grossly normal , follows commands Psych:appropriate Labs: Results:  
   
Chemistry Recent Labs 04/15/19 
3810 04/14/19 
1097 04/13/19 
0315 * 92 127*  146* 147* K 3.3* 3.2* 4.0  
 108 110* CO2 32 31 28 BUN 19* 20* 21* CREA 0.88 0.92 0.95  
CA 8.7 8.4* 9.0 AGAP 9 7 9 BUCR 22* 22* 22* AP 92 89 89  
TP 5.8* 6.0* 6.2* ALB 2.8* 2.8* 3.0*  
GLOB 3.0 3.2 3.2 AGRAT 0.9 0.9 0.9  
  
CBC w/Diff Recent Labs 04/15/19 
0608 04/14/19 
0445 04/13/19 
0315 04/12/19 
1820 WBC 1.9* 2.0* 2.0* 2.8*  
RBC 4.24 4.41 4.17* 4.29 HGB 12.1 12.5 11.8* 12.3 HCT 38.2 40.2 38.2 39.2 * 117* 112* 131* GRANS  --   --   --  56  
LYMPH  --   --   --  26 EOS  --   --   --  1 Cardiac Enzymes Recent Labs 04/13/19 
1547 04/13/19 
0915 CPK 54 54 CKND1 4.1* 4.3* Coagulation No results for input(s): PTP, INR, APTT in the last 72 hours. No lab exists for component: INREXT Lipid Panel Lab Results Component Value Date/Time Cholesterol, total 140 04/06/2011 03:50 AM  
 HDL Cholesterol 64 (H) 04/06/2011 03:50 AM  
 LDL, calculated 70.4 04/06/2011 03:50 AM  
 VLDL, calculated 5.6 04/06/2011 03:50 AM  
 Triglyceride 28 04/06/2011 03:50 AM  
 CHOL/HDL Ratio 2.2 04/06/2011 03:50 AM  
  
BNP No results for input(s): BNPP in the last 72 hours. Liver Enzymes Recent Labs 04/15/19 
0933 TP 5.8* ALB 2.8* AP 92 SGOT 26 Thyroid Studies Lab Results Component Value Date/Time TSH 0.78 04/06/2011 03:50 AM  
    
Procedures/imaging: see electronic medical records for all procedures/Xrays and details which were not copied into this note but were reviewed prior to creation of Plan Esmer Maguire MD

## 2019-04-15 NOTE — WOUND CARE
Wound Care Progress Note Follow-up visit for dressing removed by vascular Assessment:  
Communication: A&O x 4 communicative Continent Independently repositions Diet: Diabetic Patient reports no pain. Bilateral heel, buttocks, and sacral skin intact and without erythema. Generalized edema and blanching erythema to lower legs and feet. 1. POA right lower leg wound (wound location & etiology) = 0.5 x 0.5 x 0.1 cm Base is 100% of granulation tissue. Scant serosanguinous exudate. Periwound intact & without erythema. Margins are attached. Treatment: Sorbion satchet S and Modified profore wrap. Wound Recommendations:   
Leave applied dressing intact, dressing to be changed Q7 days per Dr. Sterling Rodriguez, wound care doctor following pt in clinic. Skin Care & Pressure Relief Recommendations: 
Minimize layers of linen/pads under patient to optimize support surface. Turn/reposition approximately every 2 hours and offload heels pillows or Prevalon boots. Manage incontinence / promote continence; Proshield to buttocks and sacrum daily and as needed with incontinence care Discussed above plan with patient & RN 
 
Transition of Care: Plan to follow weekly and per product recommendations while admitted to hospital.

## 2019-04-15 NOTE — WOUND CARE
04/11/19 1128 Wound Leg Lower Right;Lateral;Superior Date First Assessed/Time First Assessed: 01/07/19 1340   POA: Yes  Wound Type: Venous  Location: Leg Lower  Orientation: Right;Lateral;Superior Dressing Status  Clean, dry, and intact Dressing Type   
(profore wrap, sophie, sorbion satchet s) Non-Pressure Injury (scabbed over) Condition of Base Epithelializing Condition of Edges Closed Tissue Type Pink;Red Drainage Amount  None Wound Odor None Periwound Skin Condition Intact Cleansing and Cleansing Agents (foam wash, vashe and barrier wipes) Procedure Tolerated Well Wound Leg Lower Lateral;Right; Lower Date First Assessed/Time First Assessed: 01/07/19 1000   POA: Yes  Wound Type: Venous  Location: Leg Lower  Orientation: Lateral;Right; Lower Dressing Status  Clean, dry, and intact Dressing Type   
(ace wrap, profore wrap, sophie, sorbion satchet s) Non-Pressure Injury Full thickness (subcut/muscle) Wound Length (cm) 1.4 cm Wound Width (cm) 0.9 cm Wound Depth (cm) 0.1 Wound Surface area (cm^2) 1.26 cm^2 Change in Wound Size % 93.78 Condition of Base Pink Tissue Type Pink;Red Drainage Amount  Scant Drainage Color Serosanguinous Wound Odor Mild Periwound Skin Condition Intact Dressing Type Applied (ace wrap, profore wrap, sophie, sorbion satchet s) Procedure Tolerated Well

## 2019-04-15 NOTE — PROGRESS NOTES
1900:Received report from Yaritza Leyva RN. White board updated. Pt is alert and oriented but has periodic confusion and memory loss. Pt currently does not report any pain or respiratory distress. Pt's questions and concerns addressed at this time. Will continue to monitor. 0000: Reassessment completed, no changes compared to initial assessment, pt in NAD Bed in lowest position, and call bell within reach. Will continue to monitor. 0404: Dr. Alla Oscar called about high BP, 161/91 and second reading of 170/100, one time hydralazine 20 mg IV ordered. 0456: BP recheck 126/69  
 
0730: Bedside and Verbal shift change report given to Brenda Bryan RN (oncoming nurse) by Hank Rodriguez RN 
 (offgoing nurse). Report included the following information SBAR, Kardex, Procedure Summary, Intake/Output, MAR and Recent Results.

## 2019-04-16 ENCOUNTER — APPOINTMENT (OUTPATIENT)
Dept: GENERAL RADIOLOGY | Age: 84
DRG: 292 | End: 2019-04-16
Attending: HOSPITALIST
Payer: MEDICARE

## 2019-04-16 VITALS
DIASTOLIC BLOOD PRESSURE: 71 MMHG | RESPIRATION RATE: 16 BRPM | HEIGHT: 67 IN | WEIGHT: 178.9 LBS | BODY MASS INDEX: 28.08 KG/M2 | HEART RATE: 73 BPM | OXYGEN SATURATION: 100 % | SYSTOLIC BLOOD PRESSURE: 147 MMHG | TEMPERATURE: 97.6 F

## 2019-04-16 LAB
ANION GAP SERPL CALC-SCNC: 6 MMOL/L (ref 3–18)
BUN SERPL-MCNC: 19 MG/DL (ref 7–18)
BUN/CREAT SERPL: 21 (ref 12–20)
CALCIUM SERPL-MCNC: 8.5 MG/DL (ref 8.5–10.1)
CHLORIDE SERPL-SCNC: 104 MMOL/L (ref 100–108)
CO2 SERPL-SCNC: 31 MMOL/L (ref 21–32)
CREAT SERPL-MCNC: 0.92 MG/DL (ref 0.6–1.3)
ERYTHROCYTE [DISTWIDTH] IN BLOOD BY AUTOMATED COUNT: 16.8 % (ref 11.6–14.5)
GLUCOSE BLD STRIP.AUTO-MCNC: 110 MG/DL (ref 70–110)
GLUCOSE BLD STRIP.AUTO-MCNC: 132 MG/DL (ref 70–110)
GLUCOSE SERPL-MCNC: 117 MG/DL (ref 74–99)
HCT VFR BLD AUTO: 36.6 % (ref 35–45)
HGB BLD-MCNC: 11.6 G/DL (ref 12–16)
MAGNESIUM SERPL-MCNC: 1.9 MG/DL (ref 1.6–2.6)
MCH RBC QN AUTO: 28.4 PG (ref 24–34)
MCHC RBC AUTO-ENTMCNC: 31.7 G/DL (ref 31–37)
MCV RBC AUTO: 89.7 FL (ref 74–97)
PLATELET # BLD AUTO: 135 K/UL (ref 135–420)
PMV BLD AUTO: 11.5 FL (ref 9.2–11.8)
POTASSIUM SERPL-SCNC: 3.5 MMOL/L (ref 3.5–5.5)
RBC # BLD AUTO: 4.08 M/UL (ref 4.2–5.3)
SODIUM SERPL-SCNC: 141 MMOL/L (ref 136–145)
WBC # BLD AUTO: 2.1 K/UL (ref 4.6–13.2)

## 2019-04-16 PROCEDURE — 74011250637 HC RX REV CODE- 250/637: Performed by: HOSPITALIST

## 2019-04-16 PROCEDURE — 3E02340 INTRODUCTION OF INFLUENZA VACCINE INTO MUSCLE, PERCUTANEOUS APPROACH: ICD-10-PCS | Performed by: INTERNAL MEDICINE

## 2019-04-16 PROCEDURE — 74011250636 HC RX REV CODE- 250/636: Performed by: INTERNAL MEDICINE

## 2019-04-16 PROCEDURE — 90686 IIV4 VACC NO PRSV 0.5 ML IM: CPT | Performed by: INTERNAL MEDICINE

## 2019-04-16 PROCEDURE — 90471 IMMUNIZATION ADMIN: CPT

## 2019-04-16 PROCEDURE — 80048 BASIC METABOLIC PNL TOTAL CA: CPT

## 2019-04-16 PROCEDURE — 71046 X-RAY EXAM CHEST 2 VIEWS: CPT

## 2019-04-16 PROCEDURE — 85027 COMPLETE CBC AUTOMATED: CPT

## 2019-04-16 PROCEDURE — 83735 ASSAY OF MAGNESIUM: CPT

## 2019-04-16 PROCEDURE — 82962 GLUCOSE BLOOD TEST: CPT

## 2019-04-16 PROCEDURE — 36415 COLL VENOUS BLD VENIPUNCTURE: CPT

## 2019-04-16 RX ADMIN — HEPARIN SODIUM 5000 UNITS: 5000 INJECTION INTRAVENOUS; SUBCUTANEOUS at 11:34

## 2019-04-16 RX ADMIN — POTASSIUM CHLORIDE 20 MEQ: 20 TABLET, EXTENDED RELEASE ORAL at 08:56

## 2019-04-16 RX ADMIN — HEPARIN SODIUM 5000 UNITS: 5000 INJECTION INTRAVENOUS; SUBCUTANEOUS at 03:28

## 2019-04-16 RX ADMIN — FUROSEMIDE 40 MG: 40 TABLET ORAL at 08:56

## 2019-04-16 RX ADMIN — CARVEDILOL 6.25 MG: 3.12 TABLET, FILM COATED ORAL at 08:56

## 2019-04-16 RX ADMIN — INFLUENZA VIRUS VACCINE 0.5 ML: 15; 15; 15; 15 SUSPENSION INTRAMUSCULAR at 15:34

## 2019-04-16 RX ADMIN — SITAGLIPTIN 25 MG: 25 TABLET, FILM COATED ORAL at 08:55

## 2019-04-16 NOTE — PROGRESS NOTES
Transition of care: anticipate d/c SNf today Daughter states she will drive patient to [facility]. Dr. Jimbo Kasper has done d/c summary and is available for [facility] to review. Telephone call with Samuel Kelley at [facility] she is able to see d/c summary now. Plan for d/c. Telephone all with daughter states she and her  will transport patient to [facility] informed her she has to arrive before [phone] verbalized understanding Samuel Kelley is able to accommodate today at [facility] Sonia Herrera RN is aware of plan of care. Please call report to [facility] at [address] [phone] for report. Prior to patint leaving [facility] Please include all hard scripts for controlled substances, med rec and dc summary in packet. Please medicate for pain prior to dc if possible and needed to help offset delay when patient first arrives to facility. Care Management Interventions PCP Verified by CM: Yes Mode of Transport at Discharge: Other (see comment)(family will trasnport) Transition of Care Consult (CM Consult): SNF Partner SNF: Yes Physical Therapy Consult: Yes Occupational Therapy Consult: Yes Current Support Network: Other Confirm Follow Up Transport: Family Plan discussed with Pt/Family/Caregiver: Yes Freedom of Choice Offered: Yes Discharge Location Discharge Placement: Skilled nursing facility

## 2019-04-16 NOTE — DISCHARGE SUMMARY
[address]     Name:  Juan Ta  MR#:   [id number]  :  1926  ACCOUNT #:  [de-identified]  ADMIT DATE:  2019  DISCHARGE DATE:  2019    DISCHARGE DIAGNOSES  1. Acute diastolic congestive heart failure. 2.  Chronic persistent atrial fibrillation. 3.  Hypomagnesemia. 4.  History of gastrointestinal bleeding. 5.  Noninsulin-dependent diabetes mellitus. 6.  Falls and weakness. 7.  Leukopenia. 8.  Mild thrombocytopenia. 9.  History of multiple strokes. DISCHARGE MEDICATIONS:  Include the following:  Coreg 25 mg daily, Lasix 20 mg twice daily, Tylenol 650 every 4 hours as needed, Accu-Cheks a.c. and at bedtime with sliding scale insulin as needed. HOSPITAL SUMMARY:  This is a 51-year-old lady who sees Dr. Ron Monreal, now retired, but sees Dr. Naren Palm instead. She has a do not resuscitate code status. She lives in an independent apartment [place], but has been progressively getting weaker over the last 2 weeks. She had increasing dyspnea on exertion. She had had two falls from her knees giving out. 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 She has had prior strokes. She is off anticoagulation for her chronic AFib due to a history of GI bleeding. She is not on aspirin either . She has a history of high blood pressure and breast cancer. When the patient came into the emergency room, it was for the above-noted issues of increasing dyspnea and falls. She was found to have a mild-to-moderate right pleural effusion. She was given IV Lasix, which resulted in good diuresis. She does have a history of diabetes mellitus. It does not appear that she is on any chronic medications for that.   Her hemoglobin A1c is 7.8%, and her most recent blood sugars are between 105 and 149.    Her H and H are stable at 12.1 and 38.2. Her white count is down at 1.9. It looks like it has been on the lower end of things since 2011 looking back at prior labs, not quite this low, but definitely below normal.  Her platelets are 668,742, which looks like she suffered from intermittent chronic thrombocytopenia as well. Urinalysis was not completed on admission. She has had no dysuria or urinary complaints. She did have 3 sets of cardiac markers, which are within normal limits. Troponin is slightly elevated at 0.06, the same time 3 checks. Creatinine is 0.88. LFTs were unremarkable. She had a CT scan of her head due to the falls and concern about possible dysarthria, but there is no acute findings of a stroke. She does have age indeterminate left parieto-occipital cortical infarct and a small high left frontal region infarct with chronic left parieto-occipital and lateral frontal cortical infarcts. There is an age indeterminate right thalamic lacunar infarct and volume loss, and I spoke with her daughter who confirms that she has been diagnosed with prior strokes, but she is not on any aspirin or blood thinner due to GI bleeding. Chest x-ray showed the cardiomegaly, congestion, and edema. She seems to be stable from a cardiac standpoint and respiratory standpoint. She is not on oxygen. She is breathing better. Her echo was done here, showed an EF of 56%-60%. She does have mild-to-moderate mitral valve regurgitation. There was mild pulmonary hypertension and a right pleural effusion. We plan to repeat her chest x-ray to see how she has done after diuresis. We will perform that this evening. I also plan on one more dose of IV Lasix tonight also, and she has had a mildly depressed potassium, which is being repleted by mouth in tablet form, and today, she is doing well.   The daughter has aligned with the  to arrange for her to go to [address] [place] [address] after discharge for strengthening. PT is recommending SNF rehab, but they note that she is improving slowly and progressing towards her goals. Today, the patient is awake and alert, sitting in a chair. She is in no acute distress. She is in good spirits. Her family is at the bedside. Pulse is 78, temperature 97.9, blood pressure 148/86, respiratory rate 16, SAO2 100% on room air. Lungs, diminished breath sounds at the bases, but no wheezes or rales noted. Cardiac exam, irregular rhythm, regular rate. Abdomen, soft, nontender, no distention. Lower extremities, 1 to 2+ ankle edema, right leg was wrapped. Distal pulses palpable. Labs are as noted. For her discharge, we will plan on her going to skilled nursing, make some final adjustments in her diuretics prior to her discharge. She has a do not resuscitate in place. She is on a diabetic, low-sodium diet. She should continue to have Accu-Cheks a.c. at bedtime. She would probably benefit from an oral hypoglycemic, potentially low-dose, such as Januvia considering she has an A1c over 7. We could start her on a very low dose of Januvia and start that tomorrow and continue that at the nursing facility as well. My anticipation is that she will be on Lasix at least 40 mg daily, her Coreg 6.25 mg daily or change back to her 25 mg daily, continue Januvia 25 mg daily. Replete further potassium as necessary depending on how her metabolic panel looks tomorrow morning for her discharge day, and we will repeat the chest x-ray tonight to ensure that right pleural effusion is not worsening. Anticipated discharge tomorrow on 04/16 to [facility]. Addendum will be made on discharge day. At this time, 35 minutes on discharge time.       Agustin Torres MD      [id number]  D:  04/15/2019 18:51  T:  04/15/2019 18:54  JOB #:  [id number]  CC:  Amrit Javier MD

## 2019-04-16 NOTE — ROUTINE PROCESS
9074 - Assumed care of pt at this time. Report received from  MICHAEL Redding, [address] [place] [address] -  Head to toe assessment performed at this time. Pt denies any chest pain or SOB. Pt denies any numbness or tingling to extremities. Pt educated on the side effects of medications taken. Pt left with call light within reach and bed in low position. Will continue to monitor. Shift summary - Pt had an uneventful night. Pt left with no signs of distress.

## 2019-04-16 NOTE — PROGRESS NOTES
1900:Received report from [address] [place] [address] Rd., [address]. White board updated. Pt is alert and oriented x3, family at bedside. Pt currently does not report any pain or respiratory distress. Pt's questions and concerns addressed at this time. Will continue to monitor. 2300: Bedside and Verbal shift change report given to Franky Casas RN (oncoming nurse) by Jazmyn Guo RN 
 (offgoing nurse). Report included the following information SBAR, Kardex, Intake/Output, MAR, Recent Results and Cardiac Rhythm Afib.

## 2019-04-16 NOTE — PROGRESS NOTES
[address] Subjective: Chief Complaint: Christen Kelly is a 80 y.o. NIDDM female who presents to [facility] today, for treatment of multiple open wounds on her right leg. She states they started over three months ago and is getting worse. She sees Dr. Veto Lucas for care of her diabetes, and her FBS this AM was unknown. Past Medical History:  
Diagnosis Date  A-fib ([facility] [address] [facility]  Breast cancer ([facility] [facility] 2014  Hypertension  Menopause Past Surgical History:  
Procedure Laterality Date  HX BREAST BIOPSY Left br. Benign  HX HYSTERECTOMY Age 52 Current Medications: 
Prior to Admission medications Medication Sig Start Date End Date Taking? Authorizing Provider  
acetaminophen (TYLENOL) 325 mg tablet Take 2 Tabs by mouth every four (4) hours as needed for Pain. 4/15/19   Rani Hill MD  
furosemide (LASIX) 20 mg tablet Take 20 mg by mouth two (2) times a day. Smith Butler MD  
carvedilol (COREG) 25 mg tablet Take 25 mg by mouth daily. Smith Butler MD  
 
Allergies Allergen Reactions  Lisinopril Cough  Petrolatum,White Rash Family History Problem Relation Age of Onset  Breast Cancer Maternal Aunt  Breast Cancer Maternal Grandmother Social History Socioeconomic History  Marital status:  Spouse name: Not on file  Number of children: Not on file  Years of education: Not on file  Highest education level: Not on file Occupational History  Not on file Social Needs  Financial resource strain: Not on file  Food insecurity:  
  Worry: Not on file Inability: Not on file  Transportation needs:  
  Medical: Not on file Non-medical: Not on file Tobacco Use  Smoking status: Never Smoker  Smokeless tobacco: Never Used Substance and Sexual Activity  Alcohol use: Not Currently  Drug use: Not on file  Sexual activity: Not on file Lifestyle  Physical activity:  
  Days per week: Not on file Minutes per session: Not on file  Stress: Not on file Relationships  Social connections:  
  Talks on phone: Not on file Gets together: Not on file Attends [employer] service: Not on file Active member of club or organization: Not on file Attends meetings of clubs or organizations: Not on file Relationship status: Not on file  Intimate partner violence:  
  Fear of current or ex partner: Not on file Emotionally abused: Not on file Physically abused: Not on file Forced sexual activity: Not on file Other Topics Concern [address] Service Not Asked  Blood Transfusions Not Asked  Caffeine Concern Not Asked  Occupational Exposure Not Asked [place] Darting Hazards Not Asked  Sleep Concern Not Asked  Stress Concern Not Asked  Weight Concern Not Asked  Special Diet Not Asked  Back Care Not Asked  Exercise Not Asked  Bike Helmet Not Asked  Seat Belt Not Asked  Self-Exams Not Asked Social History Narrative  Not on file Review of Systems: 
 
Gen: No fever, chills, malaise, weight loss/gain. Heent: No headache, rhinorrhea, epistaxis, ear pain, hearing loss, sinus pain, neck pain/stiffness, sore throat. Heart: No chest pain, palpitations, MEZA, pnd, or orthopnea. Resp: No cough, hemoptysis, wheezing and shortness of breath. GI: No nausea, vomiting, diarrhea, constipation, melena or hematochezia. : No urinary obstruction, dysuria or hematuria. Derm:  Open ulcers x3 right anterior lateral leg. Musc/skeletal: no bone or joint complains. Vasc: No edema, cyanosis or claudication. Endo: No heat/cold intolerance, no polyuria,polydipsia or polyphagia. Neuro: No unilateral weakness, numbness, tingling. No seizures. Heme: No easy bruising or bleeding. Objective:  
 
Physical Assessment: 
 
Vitals:  
 01/18/19 1309 BP: (!) 147/94 Pulse: (!) 103 Resp: 18  
 Temp: 97.7 °F (36.5 °C) SpO2: 100% Lower Extremity Exam: 
 
Vascular Exam: 
Dorsalis Pedis Pulse: 0/4  Bilateral Lower Extremities Posterior Tibial Pulse: 0/4 Bilateral Lower Extremities Capillary Refill is < 3 seconds Bilateral Lower Extremities Temperature of extremity from proximal to distal is warm and perfused Bilateral Lower Extremities Recent LYNNETTE results: None Integumentary Exam: 
Skin Texture is WNL Bilateral Lower Extremities Pedal Hair is present Bilateral Lower Extremities Examination of lower extremity reveals open ulcers x3 right anterior lateral leg- 
Etiology: Started as a blister - Wound Description: Wound Type: Venous stasis Indication: Acute <30days Status:   no change Measurements: 
  Wound Length: 2 cm, 4.5 cm, 2.5 cm Wound Width :2 cm, 4.5 cm, 2.5 cm Wound Depth :0.2, 0.2, 0.2 Tissue Type: Ulcer bed: yellow fibrotic necrotic base. Periwound: Surrounding erythema with intact dermal layer Exudate: None, Slight serous Purulent drainage noted Epithelial: with out necrosis Granulation: with out necrosis Subcutaneous: with out necrosis Slough: yes Eschar: no 
Tendon: exposed no -with out necrosis Fascia: exposed no -with out necrosis Muscle: exposed no -with out necrosis Bone: exposed no -with out necrosis Drainage: Stable Debridement: Required today to promote wound healing Infection: no  
Edema: no  
 
Compression: no 
Offloading:  No 
 
Nicotine/Tobacco Use:  No 
 
 
Orthopedic Exam: Musculoskeletal Exam: Muscle strength is 5/5 Bilateral Lower Extremities. Tendon, Muscle and Bone and joints and WNL Bilateral Lower Extremities. Range of motion and strength noted in both ankles is WNL. Neurological Exam: Ankle deep tendon reflexes: Are WNL Bilateral Lower Extremities Epicritic and Protective Sensation: Are Intact Bilateral Lower Extremities Evaluation of lower extremity nerves: Are Intact as seen with 5.07 mm monofilament at 5 points per foot. Laboratory Results: 
@ Basic Metabolic Profile@ Lab Results Component Value Date  04/16/2019 CO2 31 04/16/2019 BUN 19 (H) 04/16/2019 Data Review:  
Recent Results (from the past 24 hour(s)) GLUCOSE, POC Collection Time: 04/15/19  9:17 PM  
Result Value Ref Range Glucose (POC) 114 (H) 70 - 110 mg/dL METABOLIC PANEL, BASIC Collection Time: 04/16/19  3:45 AM  
Result Value Ref Range Sodium 141 136 - 145 mmol/L Potassium 3.5 3.5 - 5.5 mmol/L Chloride 104 100 - 108 mmol/L  
 CO2 31 21 - 32 mmol/L Anion gap 6 3.0 - 18 mmol/L Glucose 117 (H) 74 - 99 mg/dL BUN 19 (H) 7.0 - 18 MG/DL Creatinine 0.92 0.6 - 1.3 MG/DL  
 BUN/Creatinine ratio 21 (H) 12 - 20 GFR est AA >60 >60 ml/min/1.73m2 GFR est non-AA 57 (L) >60 ml/min/1.73m2 Calcium 8.5 8.5 - 10.1 MG/DL  
CBC W/O DIFF Collection Time: 04/16/19  3:45 AM  
Result Value Ref Range WBC 2.1 (L) 4.6 - 13.2 K/uL  
 RBC 4.08 (L) 4.20 - 5.30 M/uL  
 HGB 11.6 (L) 12.0 - 16.0 g/dL HCT 36.6 35.0 - 45.0 % MCV 89.7 74.0 - 97.0 FL  
 MCH 28.4 24.0 - 34.0 PG  
 MCHC 31.7 31.0 - 37.0 g/dL  
 RDW 16.8 (H) 11.6 - 14.5 % PLATELET 656 014 - 635 K/uL MPV 11.5 9.2 - 11.8 FL  
MAGNESIUM Collection Time: 04/16/19  3:45 AM  
Result Value Ref Range Magnesium 1.9 1.6 - 2.6 mg/dL GLUCOSE, POC Collection Time: 04/16/19  6:20 AM  
Result Value Ref Range Glucose (POC) 110 70 - 110 mg/dL GLUCOSE, POC Collection Time: 04/16/19 11:01 AM  
Result Value Ref Range Glucose (POC) 132 (H) 70 - 110 mg/dL Assessment:  
 
80 y.o. female with Patient Active Problem List  
Diagnosis Code  Ulcer of right pretibial region, with fat layer exposed ([facility] [address] L97.812  
 CHF (congestive heart failure) (HCC) I50.9  Pleural effusion J90  Chronic atrial fibrillation (HCC) I48.2  Diabetes mellitus type 2, diet-controlled ([facility] [address] E11.9  Facial droop R29.810  
  Elevated troponin R74.8  Diastolic CHF, acute on chronic (HCC) I50.33  
 History of stroke Z86.73 Type II Diabetes with ASO/PVD BLE. Open ulcers x3 right lower extremity. Recommendation:  
 
Plan:  
 
Plan/Procedure: 
 
Ulcer Debridement Ulcer Number 3; Right  Venous  Other part of lower leg To Fat Layer Character of Ulcer: Unchanged Indication for Debridement: Slough, Exudate Pre debridement measurements: 
  Wound Length: 2 cm, 4.5 cm, 2.5 cm Wound Width :2 cm, 4.5 cm, 2.5 cm Wound Depth :0.2, 0.2, 0.2 Risks of procedure were discussed with patient. Consent has been signed. Anesthetic: Lidocaine 2% topical gel Level of Debridement: Subctaneous Tissue Tissue and/or Material removed: Siddhartha Type of Tissue: Non- Viable Pre-debridement severity: Fat Layer Exposed Post debridement severity: Fat Layer exposed Instrument(s) used: Curette Bleeding controlled with: Pressure Estimated blood loss: None Post debridement measurements:Measurements: 
  Wound Length: 2 cm, 4.5 cm, 2.5 cm Wound Width :2 cm, 4.5 cm, 2.5 cm Wound Depth :0.2, 0.2, 0.2 Post procedural pain: none Patient tolerated procedure well. Needs : 
Serial debridement- debrided today- see note below Good local wound care In wound care clinic today: 
Cleanse wound with commercial wound cleanser Apply the following topically to wound bed: LupealHarjeet valle Apply the following to juancho-wound: NA Apply the following dressings: Kira Chick. Patient to return for wound care in: 7  Days Follow up with Nurse visit as recommended. PLEASE CONTACT OFFICE AS SOON AS POSSIBLE IF UNABLE TO MAKE THIS APPOINTMENT.  Inspect your wounds, looking for signs of infection which may include the following:  Increase in redness  Red \"streaks\" from wound  Increase in swelling Fever  Unusual odor Change in the amount of wound drainage. Should you experience any significant changes in your wound(s) or have any questions regarding your home care instructions please contact the wound center or your home health company. If after regular business hours, please call your family doctor or local emergency room.  
 
 
 
Signed By: Francesco Bangura DPM 
  
 April 16, 2019, 4:23 PM

## 2019-04-16 NOTE — PROGRESS NOTES
Attempted OT tx: 
 
Goals were ongoing at time of discharge. Will resolve this plan of care as patient transitioned to next level of care. Patient discharged to Rehab.  
 
Margaux Barrett, OTR/L

## 2019-04-16 NOTE — PROGRESS NOTES
Transition of care: SNf today Dr. Erin Martin has d/c summary in chart. Stephanie Woods from [facility] [place] is able to accomodate Daughter will transport patient she is aware patient has to be there before [phone]. Raj Gautam is aware of plan. JAGUAR/ Bobby Lowery at [address] [place] [address]. [phone] for report. Please call report prior to patient leaving [facility] Please include all hard scripts for controlled substances, med rec and dc summary in packet. Please medicate for pain prior to dc if possible and needed to help offset delay when patient first arrives to facility. Care Management Interventions PCP Verified by CM: Yes Mode of Transport at Discharge: Other (see comment)(family will trasnport) Transition of Care Consult (CM Consult): SNF Partner SNF: Yes Physical Therapy Consult: Yes Occupational Therapy Consult: Yes Current Support Network: Other Confirm Follow Up Transport: Family Plan discussed with Pt/Family/Caregiver: Yes Freedom of Choice Offered: Yes Discharge Location Discharge Placement: Skilled nursing facility

## 2019-04-16 NOTE — ROUTINE PROCESS
Bedside and Verbal shift change report given to Shima Fernando RN (oncoming nurse) by EHSAN Nelson RN (offgoing nurse). Report included the following information SBAR, Kardex, Intake/Output, MAR and Recent Results.

## 2019-04-16 NOTE — ROUTINE PROCESS
TRANSFER - OUT REPORT: 
 
Verbal report given to Vanda ARIAS RN(name) on Socorro Child  being transferred to [facility] (unit) for routine progression of care Report consisted of patients Situation, Background, Assessment and  
Recommendations(SBAR). Information from the following report(s) SBAR, Kardex, Procedure Summary, Intake/Output, MAR, Accordion, Recent Results and Med Rec Status was reviewed with the receiving nurse. Lines:  
Peripheral IV 04/12/19 Left Antecubital (Active) Site Assessment Clean, dry, & intact 4/16/2019 12:00 PM  
Phlebitis Assessment 0 4/16/2019 12:00 PM  
Infiltration Assessment 0 4/16/2019 12:00 PM  
Dressing Status Clean, dry, & intact 4/16/2019 12:00 PM  
Dressing Type Tape;Transparent 4/16/2019 12:00 PM  
Hub Color/Line Status Pink;Capped 4/16/2019 12:00 PM  
Action Taken Open ports on tubing capped 4/16/2019 12:00 PM  
Alcohol Cap Used Yes 4/16/2019 12:00 PM  
  
 
Opportunity for questions and clarification was provided. Patient transported with: 
 [employer]

## 2019-04-23 ENCOUNTER — HOSPITAL ENCOUNTER (OUTPATIENT)
Dept: LAB | Age: 84
Discharge: HOME | End: 2019-04-23

## 2019-04-23 LAB
ANION GAP SERPL CALC-SCNC: 7 MMOL/L (ref 3–18)
BUN SERPL-MCNC: 19 MG/DL (ref 7–18)
BUN/CREAT SERPL: 24 (ref 12–20)
CALCIUM SERPL-MCNC: 8.7 MG/DL (ref 8.5–10.1)
CHLORIDE SERPL-SCNC: 105 MMOL/L (ref 100–108)
CO2 SERPL-SCNC: 31 MMOL/L (ref 21–32)
CREAT SERPL-MCNC: 0.79 MG/DL (ref 0.6–1.3)
GLUCOSE SERPL-MCNC: 114 MG/DL (ref 74–99)
POTASSIUM SERPL-SCNC: 3.7 MMOL/L (ref 3.5–5.5)
SODIUM SERPL-SCNC: 143 MMOL/L (ref 136–145)

## 2019-04-23 PROCEDURE — 80048 BASIC METABOLIC PNL TOTAL CA: CPT

## 2019-04-26 ENCOUNTER — HOSPITAL ENCOUNTER (OUTPATIENT)
Dept: WOUND CARE | Age: 84
Discharge: HOME | End: 2019-04-26
Payer: MEDICARE

## 2019-04-26 VITALS
SYSTOLIC BLOOD PRESSURE: 95 MMHG | RESPIRATION RATE: 18 BRPM | TEMPERATURE: 97.6 F | HEART RATE: 64 BPM | DIASTOLIC BLOOD PRESSURE: 51 MMHG | OXYGEN SATURATION: 100 %

## 2019-04-26 PROCEDURE — 29581 APPL MULTLAYER CMPRN SYS LEG: CPT

## 2019-04-26 NOTE — WOUND CARE
04/26/19 1207 Wound Leg Lower Right;Lateral;Superior Date First Assessed/Time First Assessed: 01/07/19 1340   POA: Yes  Wound Type: Venous  Location: Leg Lower  Orientation: Right;Lateral;Superior Dressing Status  Breakthrough drainage; Intact Dressing Type   
(ace wrap, kerlex, abd pads) Non-Pressure Injury Partial thickness (epider/derm) 
(multiple blisters observed) Wound Length (cm) 2 cm Wound Width (cm) 6 cm Wound Depth (cm) 0.1 Wound Surface area (cm^2) 12 cm^2 Change in Wound Size % -200 Condition of Base Granulation Condition of Edges Open Tissue Type Red Drainage Amount  Small Wound Odor None Cleansing and Cleansing Agents (foam wash, vashe and barrier wipes) Dressing Type Applied (promogran, sorbion satchet s, sophie, profore wrap) Procedure Tolerated Well Wound Leg Lower Lateral;Right; Lower Date First Assessed/Time First Assessed: 01/07/19 1000   POA: Yes  Wound Type: Venous  Location: Leg Lower  Orientation: Lateral;Right; Lower Wound Length (cm)  
(multiple blisters observed) Procedure Tolerated Well

## 2019-05-02 ENCOUNTER — HOSPITAL ENCOUNTER (OUTPATIENT)
Dept: WOUND CARE | Age: 84
Discharge: HOME | End: 2019-05-02
Attending: PODIATRIST
Payer: MEDICARE

## 2019-05-02 ENCOUNTER — PATIENT OUTREACH (OUTPATIENT)
Dept: FAMILY MEDICINE CLINIC | Age: 84
End: 2019-05-02

## 2019-05-02 VITALS
TEMPERATURE: 98 F | OXYGEN SATURATION: 100 % | SYSTOLIC BLOOD PRESSURE: 119 MMHG | HEART RATE: 79 BPM | DIASTOLIC BLOOD PRESSURE: 52 MMHG | RESPIRATION RATE: 16 BRPM

## 2019-05-02 PROCEDURE — 29581 APPL MULTLAYER CMPRN SYS LEG: CPT

## 2019-05-02 NOTE — PROGRESS NOTES
Hospital Discharge Follow-Up Date/Time:  2019 2:23 PM 
 
 
Patient discharged from SNF on 19 DISCHARGE DIAGNOSES 1. Acute diastolic congestive heart failure. 2.  Chronic persistent atrial fibrillation. 3.  Hypomagnesemia. 4.  History of gastrointestinal bleeding. 5.  Noninsulin-dependent diabetes mellitus. 6.  Falls and weakness. 7.  Leukopenia. 8.  Mild thrombocytopenia. 9.  History of multiple strokes. Patient outreach made to patient. She states she is a little tired. She states she did too much today. She states she has a follow up appointment scheduled with her pcp. She does not know the date. Her daughter has that information. Patient was seen today at the wound care clinic. Patient states she has all her medications. Top Challenges reviewed with the provider  
none Method of communication with provider :none Nurse Navigator (NN) contacted the patient by telephone to perform post hospital discharge assessment. Verified name and  with patient as identifiers. Provided introduction to self, and explanation of the Nurse Navigator role. Patient given an opportunity to ask questions and does not have any further questions or concerns at this time. The patient agrees to contact the PCP office for questions related to their healthcare. NN provided contact information for future reference. Disease Specific:   CHF Home Health orders at discharge: PT, OT, SN Home Health company: [employer] Date of initial visit: 19 Barriers to care? none Advance Care Planning:  
Does patient have an Advance Directive:  not on file Current Outpatient Medications Medication Sig  
 acetaminophen (TYLENOL) 325 mg tablet Take 2 Tabs by mouth every four (4) hours as needed for Pain.  furosemide (LASIX) 20 mg tablet Take 20 mg by mouth two (2) times a day.  carvedilol (COREG) 25 mg tablet Take 25 mg by mouth daily. No current facility-administered medications for this visit. There are no discontinued medications. [facility] follow up appointment(s):  
Future Appointments Date Time Provider Dorcas Salinas 5/9/2019  2:45 PM WOUND NURSE [facility]

## 2019-05-02 NOTE — WOUND CARE
05/02/19 1218 Wound Leg Lower Right;Lateral;Superior Date First Assessed/Time First Assessed: 01/07/19 1340   POA: Yes  Wound Type: Venous  Location: Leg Lower  Orientation: Right;Lateral;Superior Dressing Status  Clean, dry, and intact Dressing Type   
(profore wrap, sophie, sorbion satchet s) Non-Pressure Injury Partial thickness (epider/derm) Wound Length (cm) 2 cm Wound Width (cm) 8 cm Wound Depth (cm) 0.1 Wound Surface area (cm^2) 16 cm^2 Change in Wound Size % -300 Condition of Base Granulation Condition of Edges Open Tissue Type Red Tissue Type Percent Red 100 Drainage Amount  Small Drainage Color Serosanguinous Wound Odor None Periwound Skin Condition Intact Cleansing and Cleansing Agents (foam wash, vashe and barrier wipes) Dressing Type Applied [facility] [employer] 
(promogran, aquacel ag, [facility]) Procedure Tolerated Well Wound Leg Lower Lateral;Right; Lower Date First Assessed/Time First Assessed: 01/07/19 1000   POA: Yes  Wound Type: Venous  Location: Leg Lower  Orientation: Lateral;Right; Lower Wound Length (cm)  
(blisters resolved)

## 2019-05-08 ENCOUNTER — APPOINTMENT (OUTPATIENT)
Dept: GENERAL RADIOLOGY | Age: 84
End: 2019-05-08
Attending: PHYSICIAN ASSISTANT
Payer: MEDICARE

## 2019-05-08 ENCOUNTER — APPOINTMENT (OUTPATIENT)
Dept: VASCULAR SURGERY | Age: 84
End: 2019-05-08
Attending: PHYSICIAN ASSISTANT
Payer: MEDICARE

## 2019-05-08 ENCOUNTER — HOSPITAL ENCOUNTER (EMERGENCY)
Age: 84
Discharge: HOME | End: 2019-05-08
Attending: EMERGENCY MEDICINE
Payer: MEDICARE

## 2019-05-08 VITALS
RESPIRATION RATE: 16 BRPM | HEIGHT: 69 IN | BODY MASS INDEX: 26.51 KG/M2 | DIASTOLIC BLOOD PRESSURE: 81 MMHG | WEIGHT: 179 LBS | SYSTOLIC BLOOD PRESSURE: 148 MMHG | HEART RATE: 62 BPM | TEMPERATURE: 97.7 F | OXYGEN SATURATION: 100 %

## 2019-05-08 DIAGNOSIS — M19.90 ARTHRITIS: ICD-10-CM

## 2019-05-08 DIAGNOSIS — M79.662 PAIN IN LEFT LOWER LEG: Primary | ICD-10-CM

## 2019-05-08 DIAGNOSIS — S81.802A WOUND OF LEFT LOWER EXTREMITY, INITIAL ENCOUNTER: ICD-10-CM
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
## 2019-05-08 DIAGNOSIS — R60.0 PEDAL EDEMA: ICD-10-CM

## 2019-05-08 PROCEDURE — 93971 EXTREMITY STUDY: CPT

## 2019-05-08 PROCEDURE — 99283 EMERGENCY DEPT VISIT LOW MDM: CPT

## 2019-05-08 PROCEDURE — 73590 X-RAY EXAM OF LOWER LEG: CPT

## 2019-05-08 PROCEDURE — 74011250637 HC RX REV CODE- 250/637: Performed by: PHYSICIAN ASSISTANT

## 2019-05-08 RX ORDER — ACETAMINOPHEN 500 MG
500 TABLET ORAL
Status: COMPLETED | OUTPATIENT
Start: 2019-05-08 | End: 2019-05-08

## 2019-05-08 RX ADMIN — ACETAMINOPHEN 500 MG: 500 TABLET, FILM COATED ORAL at 17:05

## 2019-05-08 NOTE — ED PROVIDER NOTES
EMERGENCY DEPARTMENT HISTORY AND PHYSICAL EXAM 
 
Date: 5/8/2019 Patient Name: Socorro Andrade History of Presenting Illness Chief Complaint Patient presents with  Leg Pain History Provided By: Patient and Patient's Daughter Chief Complaint: left lower leg pain Additional History (Context):  
1:45 PM 
Socorro Child is a 80 y.o. female with PMHX A. fib, hypertension, congestive heart failure presents to the emergency department C/O left lower leg pain that began his morning. Yesterday she noticed a large blister to the side of the lower leg. Today she states her leg gave out on her and she fell landing on her buttocks. Denies any head injury. No other injury. Patient was recently diagnosed with CHF and started on Lasix. She is followed by the wound clinic for the right leg. She denies any shortness of breath, chest pain or dizziness PCP: Brittney Domingo MD 
 
Current Outpatient Medications Medication Sig Dispense Refill  furosemide (LASIX) 20 mg tablet Take 20 mg by mouth two (2) times a day.  carvedilol (COREG) 25 mg tablet Take 25 mg by mouth daily.  acetaminophen (TYLENOL) 325 mg tablet Take 2 Tabs by mouth every four (4) hours as needed for Pain. 1 Tab 0 Past History Past Medical History: 
Past Medical History:  
Diagnosis Date  A-fib ([facility] [address]  Breast cancer ([facility] [address] 2014  Hypertension  Menopause Past Surgical History: 
Past Surgical History:  
Procedure Laterality Date  HX BREAST BIOPSY Left br. Benign  HX HYSTERECTOMY Age 52 Family History: 
Family History Problem Relation Age of Onset  Breast Cancer Maternal Aunt  Breast Cancer Maternal Grandmother Social History: 
Social History Tobacco Use  Smoking status: Never Smoker  Smokeless tobacco: Never Used Substance Use Topics  Alcohol use: Not Currently  Drug use: Not on file Allergies: Allergies Allergen Reactions  Lisinopril Cough  Petrolatum,White Rash Review of Systems Review of Systems Constitutional: Negative for chills and fever. Respiratory: Negative for shortness of breath. Cardiovascular: Negative for chest pain. Musculoskeletal: Positive for arthralgias (left leg pain ). Skin: Positive for wound (blister left lower leg ). Neurological: Negative for weakness and numbness. All other systems reviewed and are negative. Physical Exam  
 
Vitals:  
 05/08/19 1318 BP: 148/81 Pulse: 62 Resp: 16 Temp: 97.7 °F (36.5 °C) SpO2: 100% Weight: 81.2 kg (179 lb) Height: 5' 9\" (1.753 m) Physical Exam  
Constitutional: She is oriented to person, place, and time. She appears well-developed and well-nourished. HENT:  
Head: Normocephalic and atraumatic. Neck: Normal range of motion. Neck supple. Cardiovascular: Normal rate, regular rhythm, normal heart sounds and intact distal pulses. No murmur heard. Pulmonary/Chest: Effort normal and breath sounds normal. No respiratory distress. She has no wheezes. She has no rales. Abdominal: Soft. Bowel sounds are normal. There is no tenderness. Musculoskeletal:  
2+ pitting edema to the lower extremities bilaterally, she has a blister to the lateral aspect of the lower leg, partially ruptured, Neurological: She is alert and oriented to person, place, and time. Psychiatric: She has a normal mood and affect. Judgment normal.  
Nursing note and vitals reviewed. Diagnostic Study Results Labs: 
 No results found for this or any previous visit (from the past 12 hour(s)). Radiologic Studies: XR TIB/FIB LT Final Result IMPRESSION:  
  
Osteopenia, no fracture. Osteoarthritis of the left knee, particularly the  
lateral joint compartment. CT Results  (Last 48 hours) None CXR Results  (Last 48 hours) None Left lower ext PVL:  
 · No evidence of acute deep vein thrombosis in the left common femoral, superficial femoral, popliteal, posterior tibial, and peroneal veins. The veins were imaged in the transverse and longitudinal planes. The vessels showed normal color filling and compressibility. Doppler interrogation showed phasic and spontaneous flow. · As read by [facility] tech Medical Decision Making I am the first provider for this patient. I reviewed the vital signs, available nursing notes, past medical history, past surgical history, family history and social history. Vital Signs: Reviewed the patient's vital signs. Pulse Oximetry Analysis: 100% on RA Records Reviewed: Nursing Notes and Old Medical Records Procedures: 
Procedures ED Course:  
1:45 PM Initial assessment performed. The patients presenting problems have been discussed, and they are in agreement with the care plan formulated and outlined with them. I have encouraged them to ask questions as they arise throughout their visit. Discussion: Pt presents with left lower leg pain. She had noticed a blister on her leg yesterday. She has a history of edema and is followed by wound clinic for the right leg. Suspect blister due to weeping secondary to edema. Does not appear infected. PVL of the leg negative for DVT x-ray shows arthritis no fractures. She has no shortness of breath. Will have patient continue her Lasix and elevate her legs. Will have patient continue a strict Tylenol at home for pain and follow-up with wound clinic tomorrow as scheduled, Ortho and her PCP. Strict return precautions given, pt offering no questions or complaints. Diagnosis and Disposition DISCHARGE NOTE: 
5:12 PM 
Sadia ALBA Arian's  results have been reviewed with her. She has been counseled regarding her diagnosis, treatment, and plan.   She verbally conveys understanding and agreement of the signs, symptoms, diagnosis, treatment and prognosis and additionally agrees to follow up as discussed. She also agrees with the care-plan and conveys that all of her questions have been answered. I have also provided discharge instructions for her that include: educational information regarding their diagnosis and treatment, and list of reasons why they would want to return to the ED prior to their follow-up appointment, should her condition change. She has been provided with education for proper emergency department utilization. CLINICAL IMPRESSION: 
 
1. Pain in left lower leg 2. Pedal edema 3. Fall, initial encounter 4. Wound of left lower extremity, initial encounter 5. Arthritis PLAN: 
1. D/C Home 2. Discharge Medication List as of 5/8/2019  4:54 PM  
  
 
3. Follow-up Information Follow up With Specialties Details Why Contact Info Additional Information Letha Lyman MD Family Practice  call for follow up  [address] [facility] Justine Caballero [facility] 
[phone] [facility] EMERGENCY DEPT Emergency Medicine  If symptoms worsen 2 Jesus Alberto Downey 
[address] [address] [phone] [facility] OP WOUND CARE Wound Care  follow up tomorrow as scheduled  2 Jesus Alberto Downey 
[address] [address] [phone] Patients scheduled for OP Wound Care visits should enter the [facility], 2nd floor, [address] for check in. Isabella Bah MD Orthopedic Surgery  call for follow up and recheck  [address] [place] [address] [address] [place] [address] 
[phone] Please note that this dictation was completed with [employer], the computer voice recognition software. Quite often unanticipated grammatical, syntax, homophones, and other interpretive errors are inadvertently transcribed by the computer software. Please disregard these errors. Please excuse any errors that have escaped final proofreading.

## 2019-05-08 NOTE — DISCHARGE INSTRUCTIONS
Leg and Ankle Edema: Care Instructions  Your Care Instructions  Swelling in the legs, ankles, and feet is called edema. It is common after you sit or stand for a while. Long plane flights or car rides often cause swelling in the legs and feet. You may also have swelling if you have to stand for long periods of time at your job. Problems with the veins in the legs (varicose veins) and changes in hormones can also cause swelling. Sometimes the swelling in the ankles and feet is caused by a more serious problem, such as heart failure, infection, blood clots, or liver or kidney disease. Follow-up care is a key part of your treatment and safety. Be sure to make and go to all appointments, and call your doctor if you are having problems. It's also a good idea to know your test results and keep a list of the medicines you take. How can you care for yourself at home? · If your doctor gave you medicine, take it as prescribed. Call your doctor if you think you are having a problem with your medicine. · Whenever you are resting, raise your legs up. Try to keep the swollen area higher than the level of your heart. · Take breaks from standing or sitting in one position. ? Walk around to increase the blood flow in your lower legs. ? Move your feet and ankles often while you stand, or tighten and relax your leg muscles. · Wear support stockings. Put them on in the morning, before swelling gets worse. · Eat a balanced diet. Lose weight if you need to. · Limit the amount of salt (sodium) in your diet. Salt holds fluid in the body and may increase swelling. When should you call for help? Call 911 anytime you think you may need emergency care. For example, call if:    · You have symptoms of a blood clot in your lung (called a pulmonary embolism). These may include:  ? Sudden chest pain. ? Trouble breathing. ?  Coughing up blood.    Call your doctor now or seek immediate medical care if:    · You have signs of a blood clot, such as:  ? Pain in your calf, back of the knee, thigh, or groin. ? Redness and swelling in your leg or groin.     · You have symptoms of infection, such as:  ? Increased pain, swelling, warmth, or redness. ? Red streaks or pus. ? A fever.    Watch closely for changes in your health, and be sure to contact your doctor if:    · Your swelling is getting worse.     · You have new or worsening pain in your legs.     · You do not get better as expected. Where can you learn more? Go to http://girish-pedro.info/. Enter E696 in the search box to learn more about \"Leg and Ankle Edema: Care Instructions. \"  Current as of: September 23, 2018  Content Version: 11.9  © 4854-6771 [employer]. Care instructions adapted under license by [employer] (which disclaims liability or warranty for this information). If you have questions about a medical condition or this instruction, always ask your healthcare professional. [place] [address] any warranty or liability for your use of this information.

## 2019-05-08 NOTE — ED TRIAGE NOTES
Pt c/o left lower leg pain s/p fall today. Pt reports that she feels as if her knee gave out causing her to fall to the ground. Pt denies injury to head, denies LOC

## 2019-05-09 ENCOUNTER — HOSPITAL ENCOUNTER (OUTPATIENT)
Dept: WOUND CARE | Age: 84
Discharge: HOME | End: 2019-05-09
Attending: PODIATRIST
Payer: MEDICARE

## 2019-05-09 VITALS
HEART RATE: 77 BPM | OXYGEN SATURATION: 99 % | DIASTOLIC BLOOD PRESSURE: 95 MMHG | TEMPERATURE: 97.7 F | SYSTOLIC BLOOD PRESSURE: 160 MMHG | RESPIRATION RATE: 17 BRPM

## 2019-05-09 PROCEDURE — 29581 APPL MULTLAYER CMPRN SYS LEG: CPT

## 2019-05-09 NOTE — PROGRESS NOTES
[address] Subjective: Chief Complaint: Jenn Ahn is a 80 y.o. NIDDM female who returns to [facility] today, for follow up treatment of multiple open ulcers on her right leg. She is status post three weeks Theraskin graft procedure and it appears to be incorporating well. Past Medical History:  
Diagnosis Date  A-fib ([facility] [address]  Breast cancer ([facility] [address] 2014  Hypertension  Menopause Past Surgical History:  
Procedure Laterality Date  HX BREAST BIOPSY Left br. Benign  HX HYSTERECTOMY Age 52 Current Medications: 
Prior to Admission medications Medication Sig Start Date End Date Taking? Authorizing Provider  
acetaminophen (TYLENOL) 325 mg tablet Take 2 Tabs by mouth every four (4) hours as needed for Pain. 4/15/19   Eunice Lomeli MD  
furosemide (LASIX) 20 mg tablet Take 20 mg by mouth two (2) times a day. Smith Butler MD  
carvedilol (COREG) 25 mg tablet Take 25 mg by mouth daily. OtherSmith MD  
 
Allergies Allergen Reactions  Lisinopril Cough  Petrolatum,White Rash Family History Problem Relation Age of Onset  Breast Cancer Maternal Aunt  Breast Cancer Maternal Grandmother Social History Socioeconomic History  Marital status:  Spouse name: Not on file  Number of children: Not on file  Years of education: Not on file  Highest education level: Not on file Occupational History  Not on file Social Needs  Financial resource strain: Not on file  Food insecurity:  
  Worry: Not on file Inability: Not on file  Transportation needs:  
  Medical: Not on file Non-medical: Not on file Tobacco Use  Smoking status: Never Smoker  Smokeless tobacco: Never Used Substance and Sexual Activity  Alcohol use: Not Currently  Drug use: Not on file  Sexual activity: Not on file Lifestyle  Physical activity: Days per week: Not on file Minutes per session: Not on file  Stress: Not on file Relationships  Social connections:  
  Talks on phone: Not on file Gets together: Not on file Attends Yarsanism service: Not on file Active member of club or organization: Not on file Attends meetings of clubs or organizations: Not on file Relationship status: Not on file  Intimate partner violence:  
  Fear of current or ex partner: Not on file Emotionally abused: Not on file Physically abused: Not on file Forced sexual activity: Not on file Other Topics Concern [address] Service Not Asked  Blood Transfusions Not Asked  Caffeine Concern Not Asked  Occupational Exposure Not Asked Frederic Booker Hazards Not Asked  Sleep Concern Not Asked  Stress Concern Not Asked  Weight Concern Not Asked  Special Diet Not Asked  Back Care Not Asked  Exercise Not Asked  Bike Helmet Not Asked  Seat Belt Not Asked  Self-Exams Not Asked Social History Narrative  Not on file Objective:  
 
Physical Assessment: 
 
Vitals:  
 03/08/19 1340 BP: 152/65 Pulse: 77 Resp: 18 Temp: 98.2 °F (36.8 °C) SpO2: 100% Lower Extremity Exam: 
 
Vascular Exam: 
Dorsalis Pedis Pulse: 1/4  Bilateral Lower Extremities Posterior Tibial Pulse: 1/4 Bilateral Lower Extremities Capillary Refill is < 3 seconds Bilateral Lower Extremities Temperature of extremity from proximal to distal is warm and perfused Bilateral Lower Extremities Recent LYNNETTE results: Right=1.05       Left=0.7 on 1- Integumentary Exam: 
Skin Texture is WNL Bilateral Lower Extremities Pedal Hair is present Bilateral Lower Extremities Examination of lower extremity reveals open ulcers right leg Etiology: Started as a blister Wound Description: Wound Type: Venous stasis Indication:    Chronic >30days Status:  improving Measurements: 
  Wound Length: 1.2 cm, 3.7 cm, 2 cm Wound Width :1 cm, 2.5 cm, 1.6 cm Wound Depth :0.1, 0.1, 0.1 Tissue Type: Ulcer bed: Red granular base Periwound: Surrounding erythema with intact dermal layer Exudate: No drainage noted Epithelial: with out necrosis Granulation: with out necrosis Subcutaneous: with out necrosis Slough: no 
Eschar: no 
Tendon: exposed no -with out necrosis Fascia: exposed no -with out necrosis Muscle: exposed no -with out necrosis Bone: exposed no -with out necrosis Drainage:  None Debridement:   --not required Infection: no 
Edema: no  
 
Compression: no  
Offloading:  No 
Nicotine/Tobacco Use:  No  
 
Nutrition: 
Status  WNL Laboratory Results: 
@ Basic Metabolic Profile@ Lab Results Component Value Date  04/23/2019 CO2 31 04/23/2019 BUN 19 (H) 04/23/2019 Data Review: No results found for this or any previous visit (from the past 24 hour(s)). Assessment:  
 
80 y.o. female with Patient Active Problem List  
Diagnosis Code  Ulcer of right pretibial region, with fat layer exposed ([address] L97.812  
 CHF (congestive heart failure) ([facility] I50.9  Pleural effusion J90  Chronic atrial fibrillation ([facility]) I48.2  Diabetes mellitus type 2, diet-controlled ([address] E11.9  Facial droop R29.810  Elevated troponin R74.8  Diastolic CHF, acute on chronic ([facility]) I50.33  
 History of stroke Z86.73 Open venous stasis ulcers right leg Venous insufficiency bilateral lower extremities. Recommendation:  
 
Plan:  
 
Plan/Procedure: 
Needs : 
 
Good local wound care In wound care clinic today: 
Cleanse wound with commercial wound cleanser Apply the following topically to wound bed: WCL Proshield Apply the following to juancho-wound: NA Apply the following dressings: Exudry, Profore Patient to return for wound care in: 7  Days Follow up with Nurse visit as recommended.  
 
PLEASE CONTACT OFFICE AS SOON AS POSSIBLE IF UNABLE TO MAKE THIS APPOINTMENT. Inspect your wounds, looking for signs of infection which may include the following:  Increase in redness  Red \"streaks\" from wound  Increase in swelling Fever  Unusual odor Change in the amount of wound drainage. Should you experience any significant changes in your wound(s) or have any questions regarding your home care instructions please contact the wound center or your home health company. If after regular business hours, please call your family doctor or local emergency room. Edema Control:   Elevate legs as much as possible. Avoid standing in one position for more than 10 minutes. Avoid setting with legs down. Do not cross legs while sitting. Off-Loading:     Frequent position changes. Do not cross legs while sitting. Shift weight every 20 minutes or more when sitting for prolonged periods of time.  
 
 
Signed By: Abdifatah Otero DPM 
  
 May 9, 2019, 6:07 PM

## 2019-05-14 LAB
ATRIAL RATE: 72 BPM
CALCULATED R AXIS, ECG10: -45 DEGREES
CALCULATED T AXIS, ECG11: 87 DEGREES
DIAGNOSIS, 93000: NORMAL
Q-T INTERVAL, ECG07: 386 MS
QRS DURATION, ECG06: 96 MS
QTC CALCULATION (BEZET), ECG08: 459 MS
VENTRICULAR RATE, ECG03: 85 BPM

## 2019-05-17 ENCOUNTER — HOSPITAL ENCOUNTER (OUTPATIENT)
Dept: WOUND CARE | Age: 84
Discharge: HOME | End: 2019-05-17
Attending: PODIATRIST
Payer: MEDICARE

## 2019-05-17 ENCOUNTER — APPOINTMENT (OUTPATIENT)
Dept: GENERAL RADIOLOGY | Age: 84
End: 2019-05-17
Attending: NURSE PRACTITIONER
Payer: MEDICARE

## 2019-05-17 ENCOUNTER — HOSPITAL ENCOUNTER (EMERGENCY)
Age: 84
Discharge: HOME | End: 2019-05-17
Attending: EMERGENCY MEDICINE
Payer: MEDICARE

## 2019-05-17 ENCOUNTER — APPOINTMENT (OUTPATIENT)
Dept: VASCULAR SURGERY | Age: 84
End: 2019-05-17
Attending: NURSE PRACTITIONER
Payer: MEDICARE

## 2019-05-17 VITALS
RESPIRATION RATE: 16 BRPM | BODY MASS INDEX: 27.99 KG/M2 | TEMPERATURE: 98 F | WEIGHT: 189 LBS | HEART RATE: 90 BPM | SYSTOLIC BLOOD PRESSURE: 154 MMHG | OXYGEN SATURATION: 100 % | HEIGHT: 69 IN | DIASTOLIC BLOOD PRESSURE: 112 MMHG

## 2019-05-17 DIAGNOSIS — R10.31 RIGHT INGUINAL PAIN: Primary | ICD-10-CM

## 2019-05-17 PROCEDURE — 73502 X-RAY EXAM HIP UNI 2-3 VIEWS: CPT

## 2019-05-17 PROCEDURE — 29581 APPL MULTLAYER CMPRN SYS LEG: CPT

## 2019-05-17 PROCEDURE — 99282 EMERGENCY DEPT VISIT SF MDM: CPT

## 2019-05-17 PROCEDURE — 73552 X-RAY EXAM OF FEMUR 2/>: CPT

## 2019-05-17 PROCEDURE — 93926 LOWER EXTREMITY STUDY: CPT

## 2019-05-17 NOTE — DISCHARGE INSTRUCTIONS
For Home Care/Self Care     Keep dressing dry and intact when bathing     Leave dressings in place until next visit (Do not remove profore wrap)     Patient to return for wound care in:  1 week for nurse assessment and dressing change     PLEASE CONTACT OFFICE AS SOON AS POSSIBLE IF UNABLE TO MAKE THIS APPOINTMENT. Inspect your wounds, looking for signs of infection which may include the following:  Increase in redness  Red \"streaks\" from wound  Increase in swelling  Fever  Unusual odor  Change in the amount of wound drainage. Should you experience any significant changes in your wound(s) or have any questions regarding your home care instructions please contact the wound center or your home health company. If after regular business hours, please call your family doctor or local emergency room. Edema Control: Elevate legs as much as possible. Avoid standing in one position for more than 10 minutes. Avoid setting with legs down. Do not cross legs while sitting. Off-Loading:Frequent position changes. Do not cross legs while sitting.  Shift weight every 20 minutes or more when sitting for prolonged periods of time.         Collection Information     Verbal Order Info     Action Created on Order Mode Entered by Comment Responsible Provider Signed by Signed on   Ordering 04/26/19 1118 Verbal with readback JT Pettit DPM Danielle Blumenthal, DPM 05/02/19 1239   Additional Information     Associated Reports   View Encounter   Priority and Order Details   NPI Information         Electronically signed by Authorizing Provider: Barbara Mohan [id number]     Order start date/time: 4/26/2019 11:15 AM   Electronically signed by Co-signing Provider (if required):                 Order Report     Order Details    05/17/19 [id number]

## 2019-05-17 NOTE — ED PROVIDER NOTES
EMERGENCY DEPARTMENT HISTORY AND PHYSICAL EXAM 
 
Date: 5/17/2019 Patient Name: Piotr Sosa History of Presenting Illness Chief Complaint Patient presents with  Back Pain  Groin Pain History Provided By: Patient Additional History (Context):  
1:16 PM 
Piotr Sosa is a 80 y.o. female with PMHX A. fib, breast cancer, hypertension, menopause presents to the ED c/o right hip/groin pain starting on Wednesday. Per the patient she was seen at wound care this morning as she has venous stasis ulcers of her lower extremities when she expressed to her doctor that she was having extreme pain in her right hip. She was told to come to the emergency department for evaluation. She states that since Wednesday she has been having an 8 out of 10 pain in her right hip that is worse when she walks causing her to be unable to lift her leg. She has been taking Tylenol with minimal relief. She reports that she did fall approximately 2 weeks ago but denies any hip pain at that time. She states that she always has numbness in her bilateral lower legs from her venous stasis ulcers. Pt denies recent trauma, color change, fever, chest pain, shortness of breath, and any other sxs or complaints. PCP: Merrill Bonilla MD 
 
Current Outpatient Medications Medication Sig Dispense Refill  acetaminophen (TYLENOL) 325 mg tablet Take 2 Tabs by mouth every four (4) hours as needed for Pain. 1 Tab 0  
 furosemide (LASIX) 20 mg tablet Take 20 mg by mouth two (2) times a day.  carvedilol (COREG) 25 mg tablet Take 25 mg by mouth daily. Past History Past Medical History: 
Past Medical History:  
Diagnosis Date  A-fib ([facility] [address]  Breast cancer ([facility] [address] 2014  Hypertension  Menopause Past Surgical History: 
Past Surgical History:  
Procedure Laterality Date  HX BREAST BIOPSY Left br. Benign  HX HYSTERECTOMY Age 52 Family History: 
Family History Problem Relation Age of Onset  Breast Cancer Maternal Aunt  Breast Cancer Maternal Grandmother Social History: 
Social History Tobacco Use  Smoking status: Never Smoker  Smokeless tobacco: Never Used Substance Use Topics  Alcohol use: Not Currently  Drug use: Not Currently Allergies: Allergies Allergen Reactions  Lisinopril Cough  Petrolatum,White Rash Review of Systems Review of Systems Constitutional: Negative for chills and fever. Respiratory: Negative for chest tightness and shortness of breath. Cardiovascular: Negative for chest pain. Gastrointestinal: Negative for abdominal pain. Denies saddle paresthesia Genitourinary: Negative for dysuria. Musculoskeletal: Positive for arthralgias and back pain. Negative for neck pain and neck stiffness. Skin: Positive for wound. Positive wounds to the bilateral lower extremities Neurological: Positive for numbness. Negative for dizziness. All other systems reviewed and are negative. Physical Exam  
 
Vitals:  
 05/17/19 1310 BP: (!) 154/112 Pulse: 90 Resp: 16 Temp: 98 °F (36.7 °C) SpO2: 100% Weight: 85.7 kg (189 lb) Height: 5' 9\" (1.753 m) Physical Exam  
Constitutional: She is oriented to person, place, and time. Elderly, no acute distress HENT:  
Head: Normocephalic and atraumatic. Eyes: Conjunctivae are normal.  
Neck: Normal range of motion. Neck supple. Cardiovascular: Normal rate and regular rhythm. Pulmonary/Chest: Effort normal and breath sounds normal.  
Abdominal: Soft. Bowel sounds are normal. She exhibits no distension. There is no tenderness. There is no rebound and no guarding. No pulsatile mass present Musculoskeletal:  
Complaining of right hip pain, tenderness to palpation starting at the right femur radiating up to the hip, no obvious deformity, bilateral legs are wrapped extensively from the wound clinic, no midline lumbar tenderness to palpation or step-off noted, unable to flex leg past 10 degrees due to pain in the hip, positive femoral pulse palpated Neurological: She is alert and oriented to person, place, and time. Skin: Skin is warm and dry. Diagnostic Study Results Labs: 
 No results found for this or any previous visit (from the past 12 hour(s)). Radiologic Studies:  
 
1:16 PM 
RADIOLOGY FINDINGS 
 
XR HIP RT W OR WO PELV 2-3 VWS Final Result IMPRESSION:  
  
  
1. No acute bony abnormality. Degenerative changes. 2. Atherosclerosis. XR FEMUR RT 2 VS  
Final Result IMPRESSION:  
  
  
1. No acute bony abnormality. Osteopenia with degenerative changes. 2. Atherosclerosis. 3. Possible edematous changes of the thigh. CT Results  (Last 48 hours) None CXR Results  (Last 48 hours) None Medical Decision Making I am the first provider for this patient. I reviewed the vital signs, available nursing notes, past medical history, past surgical history, family history and social history. Vital Signs: Reviewed the patient's vital signs. Pulse Oximetry Analysis: 100% on RA Records Reviewed: REVIEWED OLD RECORDS AND NURSING NOTES Procedures: 
Procedures ED Course: 
1:16 PM: Initial Contact 3:01 PM Discussed patient's history, exam, and available diagnostics results with Dr. Liban Marks (ED attending) who will evaluate the patient FACE-TO-FACE PROGRESS NOTE: 
3:07 PM 
The patient presents with bilateral hip pain that radiates to her groin. Was sent here from wound clinic after dressings were changed My exam shows bilateral lower extremity edema that is +3 with clean dry and intact dressing Imp/plan: Doppler arterial studies within normal limits. PVL obtained on May 8 showing no DVT. X-rays today showing DJD, clinical suspicion for her underlying pain.  
I have personally seen and examined the patient, reviewed the RENZO's note and agree with findings and plan. Written by Jose E Arteaga DO 
 
 
 
3:01 PM: Patient and family updated on results Provider Notes (Medical Decision Making): Patient presents emergency department complaining of right groin/hip pain without obvious injury starting on Wednesday. No signs of neurovascular compromise on exam.  She is very difficult to examine due to venous ulcers and leg wrapping from wound care. X-ray showed no acute process and arterial study showed no significant stenosis. Will discharge with strict return precautions as well as PCP and orthopedic follow-up. Pt understands reasons to return and is offering no questions or concerns. Diagnosis and Disposition Discharge Note: 
3:05 PM: 
Carrie Don's  results have been reviewed with her. She has been counseled regarding her diagnosis, treatment, and plan. She verbally conveys understanding and agreement of the signs, symptoms, diagnosis, treatment and prognosis and additionally agrees to follow up as discussed. She also agrees with the care-plan and conveys that all of her questions have been answered. I have also provided discharge instructions for her that include: educational information regarding their diagnosis and treatment, and list of reasons why they would want to return to the ED prior to their follow-up appointment, should her condition change. She has been provided with education for proper emergency department utilization. Clinical Impression: 1. Right inguinal pain Plan: 
1. D/C Home 2. Discharge Medication List as of 5/17/2019  3:17 PM  
  
 
3. Follow-up Information Follow up With Specialties Details Why Contact Info Jamie Cruz MD Family Practice Schedule an appointment as soon as possible for a visit in 1 day  [address] [facility] 
[phone]  Ori Hector MD Orthopedic Surgery Schedule an appointment as soon as possible for a visit in 3 days  [address] [place] [address] [address] [facility] 
[phone] [facility] EMERGENCY DEPT Emergency Medicine  As needed, If symptoms worsen [address] [address] 
[phone] Please note that this dictation was completed with Spindle, the computer voice recognition software. Quite often unanticipated grammatical, syntax, homophones, and other interpretive errors are inadvertently transcribed by the computer software. Please disregard these errors. Please excuse any errors that have escaped final proofreading.  
 
Cari Romo, MILA-BC

## 2019-05-17 NOTE — WOUND CARE
05/17/19 1616 Wound Leg Lower Right;Lateral;Superior Date First Assessed/Time First Assessed: 01/07/19 1340   POA: Yes  Wound Type: Venous  Location: Leg Lower  Orientation: Right;Lateral;Superior Dressing Status  Clean, dry, and intact Dressing Type  Compression dressing Non-Pressure Injury Partial thickness (epider/derm) Wound Length (cm) 2 cm Wound Width (cm) 8 cm Wound Depth (cm) 0.1 Wound Surface area (cm^2) 16 cm^2 Change in Wound Size % -300 Condition of Base Granulation Condition of Edges Open Tissue Type Red Tissue Type Percent Red 100 Drainage Amount  Small Drainage Color Serosanguinous Wound Odor None Periwound Skin Condition Intact Cleansing and Cleansing Agents  Dermal wound cleanser Dressing Type Applied Compression dressing Procedure Tolerated Well Wound Leg Lower Lateral;Right; Lower Date First Assessed/Time First Assessed: 01/07/19 1000   POA: Yes  Wound Type: Venous  Location: Leg Lower  Orientation: Lateral;Right; Lower Dressing Status  Intact; Old drainage Dressing Type  Absorptive; Compression dressing Non-Pressure Injury Full thickness (subcut/muscle) Wound Length (cm) 1.5 cm Wound Width (cm) 0.9 cm Wound Depth (cm) 0.1 Wound Surface area (cm^2) 1.35 cm^2 Change in Wound Size % 93.33 Condition of Base Pink Tissue Type Pink;Red Drainage Amount  Scant Drainage Color Serosanguinous Wound Odor Mild Periwound Skin Condition Intact Dressing Type Applied Absorptive; Compression dressing

## 2019-05-17 NOTE — DISCHARGE INSTRUCTIONS
Follow-up as directed  Take Tylenol as needed for pain  Return to emergency department for increased pain, numbness, loss of bowel or bladder or worsening of symptoms

## 2019-05-20 LAB
RIGHT CFA DIST SYS PSV: 83.8 CM/S
RIGHT POPLITEAL MID SYS PSV: 37.7 CM/S
RIGHT PROX PFA A PSV: 58.6 CM/S
RIGHT SFA DIST VEL RATIO: 1.31
RIGHT SFA MID VEL RATIO: 0.7
RIGHT SFA PROX VEL RATIO: 0.5
RIGHT SUPER FEMORAL DIST SYS PSV: 39.3 CM/S
RIGHT SUPER FEMORAL MID SYS PSV: 29.9 CM/S
RIGHT SUPER FEMORAL PROX SYS PSV: 45.7 CM/S

## 2019-05-21 NOTE — PROGRESS NOTES
[address]    Subjective:         Chief Complaint: Luci Coyle is a 80 y.o. NIDDM female who presents to [facility] today, with her daughter, for treatment of new blisters and open wound on her right leg. Her daughter states that two days ago, a nurse at [facility] took the dressing off of her right leg and applied six new dressings against the daughter's advice, and it caused multiple blisters and open wound on her right leg. Past Medical History:   Diagnosis Date    A-fib ([facility] [address]     Breast cancer ([facility] [address] 2014    Hypertension     Menopause      Past Surgical History:   Procedure Laterality Date    HX BREAST BIOPSY      Left br. Benign    HX HYSTERECTOMY      Age 52     Current Medications:  Prior to Admission medications    Medication Sig Start Date End Date Taking? Authorizing Provider   acetaminophen (TYLENOL) 325 mg tablet Take 2 Tabs by mouth every four (4) hours as needed for Pain. 4/15/19   Dayna Fonseca MD   furosemide (LASIX) 20 mg tablet Take 20 mg by mouth two (2) times a day. Smith Butler MD   carvedilol (COREG) 25 mg tablet Take 25 mg by mouth daily.     Smith Butler MD     Allergies   Allergen Reactions    Lisinopril Cough    Petrolatum,White Rash     Family History   Problem Relation Age of Onset    Breast Cancer Maternal Aunt     Breast Cancer Maternal Grandmother      Social History     Socioeconomic History    Marital status:      Spouse name: Not on file    Number of children: Not on file    Years of education: Not on file    Highest education level: Not on file   Occupational History    Not on file   Social Needs    Financial resource strain: Not on file    Food insecurity:     Worry: Not on file     Inability: Not on file    Transportation needs:     Medical: Not on file     Non-medical: Not on file   Tobacco Use    Smoking status: Never Smoker    Smokeless tobacco: Never Used   Substance and Sexual Activity    Alcohol use: Not Currently    Drug use: Not Currently    Sexual activity: Not on file   Lifestyle    Physical activity:     Days per week: Not on file     Minutes per session: Not on file    Stress: Not on file   Relationships    Social connections:     Talks on phone: Not on file     Gets together: Not on file     Attends Jainism service: Not on file     Active member of club or organization: Not on file     Attends meetings of clubs or organizations: Not on file     Relationship status: Not on file    Intimate partner violence:     Fear of current or ex partner: Not on file     Emotionally abused: Not on file     Physically abused: Not on file     Forced sexual activity: Not on file   Other Topics Concern     Service Not Asked    Blood Transfusions Not Asked    Caffeine Concern Not Asked    Occupational Exposure Not Asked    Hobby Hazards Not Asked    Sleep Concern Not Asked    Stress Concern Not Asked    Weight Concern Not Asked    Special Diet Not Asked    Back Care Not Asked    Exercise Not Asked    Bike Helmet Not Asked   [address] Not Asked    Self-Exams Not Asked   Social History Narrative    Not on file         Objective:     Physical Assessment:    Vitals:    04/26/19 1206   BP: 95/51   Pulse: 64   Resp: 18   Temp: 97.6 °F (36.4 °C)   SpO2: 100%     Lower Extremity Exam:    Vascular Exam:  Dorsalis Pedis Pulse: 1/4  Bilateral Lower Extremities  Posterior Tibial Pulse: 1/4 Bilateral Lower Extremities  Capillary Refill is < 3 seconds Bilateral Lower Extremities  Temperature of extremity from proximal to distal is warm and perfused Bilateral Lower Extremities  Recent LYNNETTE results: Right=1.05       Left=0.7 on 1-    Integumentary Exam:  Skin Texture is WNL Bilateral Lower Extremities  Pedal Hair is present Bilateral Lower Extremities  Examination of lower extremity reveals multiple fluid filled blisters x10, open ulcer right leg  Etiology: Started as a blister Wound Description:   Wound Type: Venous stasis     Indication:    Acute    Status:  unchanged     Measurements:    Wound Length: 2 cm, (multiple blisters observed)      Wound Width :6 cm      Wound Depth :0.1     Tissue Type: Ulcer bed: Red granular base    Periwound: Surrounding erythema with intact dermal layer    Exudate: No drainage noted  Epithelial: with out necrosis  Granulation: with out necrosis  Subcutaneous: with out necrosis  Slough: no  Eschar: no  Tendon: exposed no -with out necrosis  Fascia: exposed no -with out necrosis  Muscle: exposed no -with out necrosis  Bone: exposed no -with out necrosis   Drainage:  decreased    Debridement:   --not required    Infection: no  Edema: no     Compression: no   Offloading:  No  Nicotine/Tobacco Use:  No     Nutrition:  Status  WNL       Laboratory Results:  @ Basic Metabolic Profile@  Lab Results   Component Value Date     04/23/2019    CO2 31 04/23/2019    BUN 19 (H) 04/23/2019       Data Review: No results found for this or any previous visit (from the past 24 hour(s)). Assessment:     80 y.o. female with   Patient Active Problem List   Diagnosis Code    Ulcer of right pretibial region, with fat layer exposed ([facility] [address] L97.812    CHF (congestive heart failure) ([facility]) I50.9    Pleural effusion J90    Chronic atrial fibrillation ([facility]) I48.2    Diabetes mellitus type 2, diet-controlled ([facility]) E11.9    Facial droop R29.810    Elevated troponin S21.9    Diastolic CHF, acute on chronic ([facility]) I50.33    History of stroke Z86.73     Open venous stasis ulcer right leg  Multiple fluid filled blisters x10 right lower extremity. Recommendation:     Plan:     Plan/Procedure:    Betadine prep all blisters and josiane/drain all blisters.     Needs :    Good local wound care    In wound care clinic today:  Cleanse wound with commercial wound cleanser  Apply the following topically to wound bed: pamagram,     Apply the following to juancho-wound: NA   Apply the following dressings: profore, gauze      Patient to return for wound care in: 7  Days  Follow up with Nurse visit as recommended. PLEASE CONTACT OFFICE AS SOON AS POSSIBLE IF UNABLE TO MAKE THIS APPOINTMENT. Inspect your wounds, looking for signs of infection which may include the following:  Increase in redness  Red \"streaks\" from wound  Increase in swelling Fever  Unusual odor Change in the amount of wound drainage. Should you experience any significant changes in your wound(s) or have any questions regarding your home care instructions please contact the wound center or your home health company. If after regular business hours, please call your family doctor or local emergency room.           Signed By: Wale Lara DPM      May 21, 2019, 6:07 PM

## 2019-05-22 ENCOUNTER — HOSPITAL ENCOUNTER (OUTPATIENT)
Dept: WOUND CARE | Age: 84
Discharge: HOME | End: 2019-05-22
Attending: PODIATRIST
Payer: MEDICARE

## 2019-05-22 VITALS
TEMPERATURE: 98 F | OXYGEN SATURATION: 99 % | HEART RATE: 94 BPM | DIASTOLIC BLOOD PRESSURE: 103 MMHG | RESPIRATION RATE: 19 BRPM | SYSTOLIC BLOOD PRESSURE: 165 MMHG

## 2019-05-22 PROCEDURE — 29581 APPL MULTLAYER CMPRN SYS LEG: CPT

## 2019-05-22 PROCEDURE — 2W1QX6Z COMPRESSION OF RIGHT LOWER LEG USING PRESSURE DRESSING: ICD-10-PCS | Performed by: HOSPITALIST

## 2019-05-22 PROCEDURE — 2W1RX6Z COMPRESSION OF LEFT LOWER LEG USING PRESSURE DRESSING: ICD-10-PCS | Performed by: HOSPITALIST

## 2019-05-22 NOTE — PROGRESS NOTES
[address]    Subjective:         Chief Complaint: Asad Judge is a 80 y.o. NIDDM female who [place] daughter, complaining of new leg ulcers that were caused by increased swelling last week. Her FBS this AM was unknown. Past Medical History:   Diagnosis Date    [facility] ([facility] [address]     Breast cancer ([facility] [address] 2014    Hypertension     Menopause      Past Surgical History:   Procedure Laterality Date    HX BREAST BIOPSY      Left br. Benign    HX HYSTERECTOMY      Age 52     Current Medications:  Prior to Admission medications    Medication Sig Start Date End Date Taking? Authorizing Provider   acetaminophen (TYLENOL) 325 mg tablet Take 2 Tabs by mouth every four (4) hours as needed for Pain. 4/15/19  Yes Maikel Riggs MD   furosemide (LASIX) 20 mg tablet Take 20 mg by mouth two (2) times a day. Yes Other, MD Smith   carvedilol (COREG) 25 mg tablet Take 25 mg by mouth daily.    Yes Other, MD Smith     Allergies   Allergen Reactions    Lisinopril Cough    Petrolatum,White Rash     Family History   Problem Relation Age of Onset    Breast Cancer Maternal Aunt     Breast Cancer Maternal Grandmother      Social History     Socioeconomic History    Marital status:      Spouse name: Not on file    Number of children: Not on file    Years of education: Not on file    Highest education level: Not on file   Occupational History    Not on file   Social Needs    Financial resource strain: Not on file    Food insecurity:     Worry: Not on file     Inability: Not on file    Transportation needs:     Medical: Not on file     Non-medical: Not on file   Tobacco Use    Smoking status: Never Smoker    Smokeless tobacco: Never Used   Substance and Sexual Activity    Alcohol use: Not Currently    Drug use: Not Currently    Sexual activity: Not on file   Lifestyle    Physical activity:     Days per week: Not on file Minutes per session: Not on file    Stress: Not on file   Relationships    Social connections:     Talks on phone: Not on file     Gets together: Not on file     Attends Yarsanism service: Not on file     Active member of club or organization: Not on file     Attends meetings of clubs or organizations: Not on file     Relationship status: Not on file    Intimate partner violence:     Fear of current or ex partner: Not on file     Emotionally abused: Not on file     Physically abused: Not on file     Forced sexual activity: Not on file   Other Topics Concern     Service Not Asked    Blood Transfusions Not Asked    Caffeine Concern Not Asked    Occupational Exposure Not Asked   Carletha Cordia Hazards Not Asked    Sleep Concern Not Asked    Stress Concern Not Asked    Weight Concern Not Asked    Special Diet Not Asked    Back Care Not Asked    Exercise Not Asked    Bike Helmet Not Asked   [place] Not Asked    Self-Exams Not Asked   Social History Narrative    Not on file         Objective:     Physical Assessment:    There were no vitals filed for this visit. Lower Extremity Exam:    Vascular Exam:  Dorsalis Pedis Pulse: 1/4  Bilateral Lower Extremities  Posterior Tibial Pulse: 1/4 Bilateral Lower Extremities  Capillary Refill is < 3 seconds Bilateral Lower Extremities  Temperature of extremity from proximal to distal is warm and perfused Bilateral Lower Extremities  Recent LYNNETTE results: Right=1.05       Left=0.7 on 1-    Integumentary Exam:  Skin Texture is WNL Bilateral Lower Extremities  Pedal Hair is present Bilateral Lower Extremities  Examination of lower extremity reveals open ulcers bilateral lower extremities.   Etiology: Started from swelling     Wound Description:   Wound Type: Venous stasis     Indication:    Acute    Status:  unchanged     Measurements: (R, L)    Wound Length: 9.8cm, 1.5cm      Wound Width : 8.5cm, 4.5cm      Wound Depth : 0.2cm, 0.2cm     Tissue Type: Ulcer bed: loose slough base with positive pain on palpation    Periwound: Surrounding erythema with intact dermal layer    Exudate: serosanguinous drainage noted  Epithelial: with out necrosis  Granulation: with out necrosis  Subcutaneous: with out necrosis  Slough: yes  Eschar: no  Tendon: exposed no -with out necrosis  Fascia: exposed no -with out necrosis  Muscle: exposed no -with out necrosis  Bone: exposed no -with out necrosis   Drainage:  decreased    Debridement:   --not required    Infection: no  Edema: no     Compression: no   Offloading:  No  Nicotine/Tobacco Use:  No     Nutrition:  Status  WNL       Laboratory Results:  @ Basic Metabolic Profile@  Lab Results   Component Value Date     04/23/2019    CO2 31 04/23/2019    BUN 19 (H) 04/23/2019       Data Review: No results found for this or any previous visit (from the past 24 hour(s)). Assessment:     80 y.o. female with   Patient Active Problem List   Diagnosis Code    Ulcer of right pretibial region, with fat layer exposed ([facility] [address] L97.812    CHF (congestive heart failure) ([facility] I50.9    Pleural effusion J90    Chronic atrial fibrillation ([facility]) I48.2    Diabetes mellitus type 2, diet-controlled ([facility]) E11.9    Facial droop R29.810    Elevated troponin V64.6    Diastolic CHF, acute on chronic ([facility]) I50.33    History of stroke Z86.73     Open ulcers bilateral lower extremities  Type II DM  Venous insufficiency bilateral lower extremities with edema. Recommendation:     Plan:     Plan/Procedure:    Patient saw her PCP one week ago and changed her Lasix    Needs :    Good local wound care    In wound care clinic today:  Cleanse wound with commercial wound cleanser  Apply the following topically to wound bed: cutimed,     Apply the following to juancho-wound: NA   Apply the following dressings: profore, gauze      Patient to return for wound care in: 7  Days  Follow up with Nurse visit as recommended.     PLEASE CONTACT OFFICE AS SOON AS POSSIBLE IF UNABLE TO MAKE THIS APPOINTMENT. Inspect your wounds, looking for signs of infection which may include the following:  Increase in redness  Red \"streaks\" from wound  Increase in swelling Fever  Unusual odor Change in the amount of wound drainage. Should you experience any significant changes in your wound(s) or have any questions regarding your home care instructions please contact the wound center or your home health company. If after regular business hours, please call your family doctor or local emergency room.           Signed By: Purvi Bello DPM      May 22, 2019, 6:07 PM

## 2019-05-22 NOTE — WOUND CARE
05/22/19 1309 Wound No Date First Assessed or Time First Assessed found. Dressing Status Breakthrough drainage; Intact; Old drainage; Saturated Dressing Type Absorptive; Compression dressing Wound Length (cm) 9.5 cm Wound Width (cm) 6.5 cm Wound Depth (cm) 0.1 cm Wound Volume (cm^3) 6.18 cm^3 Condition of Base Granulation;Pink Condition of Edges Closed Assessment Drainage Tissue Type Percent Pink 50 Tissue Type Percent Red 50 Drainage Amount Copious Wound Odor Musty;Pungent Isaura-wound Assessment Intact Cleansing and Cleansing Agents  Other (comment) (vashe wound cleanser) Dressing Changed Changed/New Dressing Type Applied Absorptive; Compression dressing Procedure Tolerated Well Wound Leg Lower Right;Lateral;Superior Date First Assessed/Time First Assessed: 01/07/19 1340   POA: Yes  Wound Type: Venous  Location: Leg Lower  Orientation: Right;Lateral;Superior Dressing Status  Breakthrough drainage; Intact; Old drainage; Saturated Dressing Type  Compression dressing Non-Pressure Injury Partial thickness (epider/derm) Wound Length (cm) 1.5 cm Wound Width (cm) 2 cm Wound Depth (cm) 0.1 Wound Surface area (cm^2) 3 cm^2 Change in Wound Size % 25 Condition of Base Pink; White Condition of Edges Open Tissue Type Pink Tissue Type Percent Pink 100 Drainage Amount  Large Drainage Color Clear Wound Odor Pungent;Strong Periwound Skin Condition Intact; Macerated Cleansing and Cleansing Agents  Other (comment) (Vashe wound cleanser) Dressing Type Applied Compression dressing (mextra, soft roll, exudry, ace wrap 4 and 6 inch) Procedure Tolerated Well Wound Leg Lower Lateral;Right; Lower Date First Assessed/Time First Assessed: 01/07/19 1000   POA: Yes  Wound Type: Venous  Location: Leg Lower  Orientation: Lateral;Right; Lower Dressing Status  Breakthrough drainage; Intact Dressing Type  Absorptive; Compression dressing Non-Pressure Injury Full thickness (subcut/muscle) Wound Length (cm) 0.8 cm Wound Width (cm) 1 cm Wound Depth (cm) 0.1 Wound Surface area (cm^2) 0.8 cm^2 Change in Wound Size % 96.05 Condition of Base Pink Tissue Type Pink;Red Drainage Amount  Large Drainage Color Clear Wound Odor Pungent;Strong Periwound Skin Condition Intact Dressing Type Applied Absorptive; Compression dressing Procedure Tolerated Well Wound Leg Lower Right; Anterior; Lower Date First Assessed/Time First Assessed: 01/07/19 1000   POA: Yes  Wound Type: Venous  Location: Leg Lower  Orientation: Right; Anterior; Lower Dressing Status  Breakthrough drainage; Intact Dressing Type  Absorptive; Compression dressing Non-Pressure Injury Full thickness (subcut/muscle) Wound Length (cm) 4 cm Wound Width (cm) 5 cm Wound Depth (cm) 0.1 Wound Surface area (cm^2) 20 cm^2 Change in Wound Size % -400 Condition of Base Epithelializing Condition of Edges Rolled/curled Tissue Type Pink Tissue Type Percent Pink (20) Drainage Amount  Large Drainage Color Clear Wound Odor Pungent;Strong Cleansing and Cleansing Agents  Dermal wound cleanser Dressing Type Applied Absorptive; Compression dressing Procedure Tolerated Well

## 2019-05-24 ENCOUNTER — HOSPITAL ENCOUNTER (EMERGENCY)
Age: 84
Discharge: HOME | DRG: 871 | End: 2019-05-24
Attending: EMERGENCY MEDICINE | Admitting: EMERGENCY MEDICINE
Payer: MEDICARE

## 2019-05-24 ENCOUNTER — APPOINTMENT (OUTPATIENT)
Dept: GENERAL RADIOLOGY | Age: 84
DRG: 871 | End: 2019-05-24
Attending: EMERGENCY MEDICINE
Payer: MEDICARE

## 2019-05-24 VITALS
DIASTOLIC BLOOD PRESSURE: 90 MMHG | TEMPERATURE: 98.5 F | RESPIRATION RATE: 16 BRPM | SYSTOLIC BLOOD PRESSURE: 156 MMHG | HEIGHT: 69 IN | WEIGHT: 189 LBS | OXYGEN SATURATION: 100 % | HEART RATE: 89 BPM | BODY MASS INDEX: 27.99 KG/M2

## 2019-05-24 DIAGNOSIS — M25.511 ACUTE PAIN OF RIGHT SHOULDER: Primary | ICD-10-CM

## 2019-05-24 LAB
ATRIAL RATE: 28 BPM
CALCULATED R AXIS, ECG10: -47 DEGREES
CALCULATED T AXIS, ECG11: 119 DEGREES
DIAGNOSIS, 93000: NORMAL
Q-T INTERVAL, ECG07: 350 MS
QRS DURATION, ECG06: 98 MS
QTC CALCULATION (BEZET), ECG08: 435 MS
VENTRICULAR RATE, ECG03: 93 BPM

## 2019-05-24 PROCEDURE — A4565 SLINGS: HCPCS

## 2019-05-24 PROCEDURE — 74011250637 HC RX REV CODE- 250/637: Performed by: EMERGENCY MEDICINE

## 2019-05-24 PROCEDURE — 93005 ELECTROCARDIOGRAM TRACING: CPT

## 2019-05-24 PROCEDURE — 99284 EMERGENCY DEPT VISIT MOD MDM: CPT

## 2019-05-24 PROCEDURE — 73030 X-RAY EXAM OF SHOULDER: CPT

## 2019-05-24 RX ORDER — CARVEDILOL 12.5 MG/1
12.5 TABLET ORAL 2 TIMES DAILY
COMMUNITY

## 2019-05-24 RX ORDER — HYDROCODONE BITARTRATE AND ACETAMINOPHEN 5; 325 MG/1; MG/1
1 TABLET ORAL
Qty: 12 TAB | Refills: 0 | Status: SHIPPED | OUTPATIENT
Start: 2019-05-24 | End: 2019-05-31

## 2019-05-24 RX ORDER — LIDOCAINE 50 MG/G
PATCH TOPICAL
Qty: 1 EACH | Refills: 0 | Status: SHIPPED | OUTPATIENT
Start: 2019-05-24

## 2019-05-24 RX ORDER — OXYCODONE HYDROCHLORIDE 5 MG/1
5 TABLET ORAL
Status: COMPLETED | OUTPATIENT
Start: 2019-05-24 | End: 2019-05-24

## 2019-05-24 RX ADMIN — OXYCODONE HYDROCHLORIDE 5 MG: 5 TABLET ORAL at 11:37

## 2019-05-24 NOTE — ED PROVIDER NOTES
EMERGENCY DEPARTMENT HISTORY AND PHYSICAL EXAM    Date: 5/24/2019  Patient Name: Shawn Church    History of Presenting Illness     Chief Complaint   Patient presents with    Joint Pain         History Provided By: Patient and Patient's Daughter    10:48 AM  Shawn Church is a 80 y.o. female with PMHX of chronic leg wounds who presents to the emergency department C/O right shoulder pain. Per the patient she started having pain in her right shoulder last night and could not sleep due to the pain. She tried some Tylenol without relief. She denies any trauma or injury. She states the pain is worse with touching the shoulder or moving the shoulder at all and radiates down the right arm. She also notes some right hip pain that she had for a few weeks but states that has been getting better recently. She has dressings on bilateral legs that are weeping but she states that has been going on for many weeks and that wound care has instructed that no one else is to change her dressing. She denies any chest pain, shortness of breath, fever, cough, or any other complaints. PCP: Pearl Echevarria MD    Current Outpatient Medications   Medication Sig Dispense Refill    carvedilol (COREG) 12.5 mg tablet Take 12.5 mg by mouth two (2) times a day.  acetaminophen (TYLENOL) 325 mg tablet Take 2 Tabs by mouth every four (4) hours as needed for Pain. 1 Tab 0    furosemide (LASIX) 20 mg tablet Take 20 mg by mouth two (2) times a day. Past History     Past Medical History:  Past Medical History:   Diagnosis Date    [facility]fib ([facility]     Breast cancer ([facility] [address] [facility] 2014    Chronic pain     Hypertension     Menopause        Past Surgical History:  Past Surgical History:   Procedure Laterality Date    HX BREAST BIOPSY      Left br.   Benign    HX HYSTERECTOMY      Age 52       Family History:  Family History   Problem Relation Age of Onset    Breast Cancer Maternal Aunt     Breast Cancer Maternal Grandmother Social History:  Social History     Tobacco Use    Smoking status: Never Smoker    Smokeless tobacco: Never Used   Substance Use Topics    Alcohol use: Not Currently    Drug use: Not Currently       Allergies: Allergies   Allergen Reactions    Lisinopril Cough    Petrolatum,White Rash         Review of Systems   Review of Systems   Constitutional: Negative for fever. Respiratory: Negative for shortness of breath. Cardiovascular: Positive for leg swelling. Negative for chest pain. Musculoskeletal: Positive for arthralgias. All other systems reviewed and are negative. Physical Exam     Vitals:    05/24/19 1041   BP: 156/90   Pulse: 89   Resp: 16   Temp: 98.5 °F (36.9 °C)   SpO2: 100%   Weight: 85.7 kg (189 lb)   Height: 5' 9\" (1.753 m)     Physical Exam    Nursing notes and vital signs reviewed    Constitutional: Non toxic appearing, with a, mild distress  Head: Normocephalic, Atraumatic  Eyes: EOMI  Neck: Supple  Cardiovascular: Regular rate and rhythm, no murmurs, rubs, or gallops  Chest: Normal work of breathing and chest excursion bilaterally  Lungs: Clear to ausculation bilaterally  Back: No evidence of trauma or deformity  Extremities: Exquisitely tender over the right shoulder with pain with any range of motion, distal neurovascular intact. Severe bilateral lower extremity edema with wet dressings from weeping bilaterally but distal neurovascular intact and instructed by daughter the dressings are not to be removed.   Skin: Warm and dry, normal cap refill  Neuro: Alert and appropriate  Psychiatric: Normal mood and affect      Diagnostic Study Results     Labs -     Recent Results (from the past 12 hour(s))   EKG, 12 LEAD, INITIAL    Collection Time: 05/24/19 11:08 AM   Result Value Ref Range    Ventricular Rate 93 BPM    Atrial Rate 28 BPM    QRS Duration 98 ms    Q-T Interval 350 ms    QTC Calculation (Bezet) 435 ms    Calculated R Axis -47 degrees    Calculated T Axis 119 degrees Diagnosis       Atrial fibrillation with premature ventricular or aberrantly conducted   complexes  Left axis deviation  Incomplete right bundle branch block  Anterior infarct (cited on or before 12-APR-2019)  Abnormal ECG  When compared with ECG of 12-APR-2019 17:39,  No significant change was found  Confirmed by Yina Garcia MD, -- ([id number]) on 5/24/2019 11:19:06 AM         Radiologic Studies -   XR SHOULDER RT AP/LAT MIN 2 V   Final Result   No acute or overtly significant abnormality identified. CT Results  (Last 48 hours)    None        CXR Results  (Last 48 hours)    None          Medications given in the ED-  Medications   oxyCODONE IR (ROXICODONE) tablet 5 mg (5 mg Oral Given 5/24/19 7159)         Medical Decision Making   I am the first provider for this patient. I reviewed the vital signs, available nursing notes, past medical history, past surgical history, family history and social history. Vital Signs-Reviewed the patient's vital signs. EKG interpretation: (Preliminary)  EKG read by Dr. Isaiah Owusu at 11:21 AM  Atrial fibrillation at a rate of 93 bpm, NV duration of 98 [place] QT corrected 435 ms, not significantly changed from comparison to April 12, 2018    Records Reviewed: Nursing Notes, Old Medical Records and Previous electrocardiograms    Provider Notes (Medical Decision Making): Dany Cordero is a 80 y.o. female presenting with right shoulder pain that is reproducible on palpation and range of motion, degenerative changes on x-ray, EKG unchanged from prior, plan for discharge with pain management, PCP and orthopedic follow-up, and return precautions. Patient and her family understand agree with this plan. Procedures:  Procedures    ED Course:   Patient reports the pain is slightly better and is still very reproducible on palpation and range of motion. Diagnosis and Disposition       DISCHARGE NOTE:    Vanita Don's  results have been reviewed with her.   She has been counseled regarding her diagnosis, treatment, and plan. She verbally conveys understanding and agreement of the signs, symptoms, diagnosis, treatment and prognosis and additionally agrees to follow up as discussed. She also agrees with the care-plan and conveys that all of her questions have been answered. I have also provided discharge instructions for her that include: educational information regarding their diagnosis and treatment, and list of reasons why they would want to return to the ED prior to their follow-up appointment, should her condition change. She has been provided with education for proper emergency department utilization. CLINICAL IMPRESSION:    1. Acute pain of right shoulder        PLAN:  1. D/C Home  2. Current Discharge Medication List        3. Follow-up Information    None       _______________________________      Please note that this dictation was completed with Qianmi, the computer voice recognition software. Quite often unanticipated grammatical, syntax, homophones, and other interpretive errors are inadvertently transcribed by the computer software. Please disregard these errors. Please excuse any errors that have escaped final proofreading.

## 2019-05-24 NOTE — ED TRIAGE NOTES
Pt arrived per EMS from [facility] w/ c/o of RIGHT shoulder and RIGHT hip chronic joint pain. Pt was given Tylenol last night w/ no relief. Pt is in NAD at this time.

## 2019-05-24 NOTE — DISCHARGE INSTRUCTIONS

## 2019-05-25 ENCOUNTER — APPOINTMENT (OUTPATIENT)
Dept: GENERAL RADIOLOGY | Age: 84
DRG: 871 | End: 2019-05-25
Attending: EMERGENCY MEDICINE
Payer: MEDICARE

## 2019-05-25 ENCOUNTER — APPOINTMENT (OUTPATIENT)
Dept: CT IMAGING | Age: 84
DRG: 871 | End: 2019-05-25
Attending: EMERGENCY MEDICINE
Payer: MEDICARE

## 2019-05-25 ENCOUNTER — HOSPITAL ENCOUNTER (INPATIENT)
Age: 84
LOS: 6 days | Discharge: NURSING, REHAB OR LONG-TERM CARE | DRG: 871 | End: 2019-05-31
Attending: EMERGENCY MEDICINE | Admitting: HOSPITALIST
Payer: MEDICARE

## 2019-05-25 DIAGNOSIS — A41.9 SEPSIS, DUE TO UNSPECIFIED ORGANISM: Primary | ICD-10-CM

## 2019-05-25 DIAGNOSIS — D72.825 BANDEMIA: ICD-10-CM

## 2019-05-25 DIAGNOSIS — L03.115 CELLULITIS OF RIGHT LOWER EXTREMITY: ICD-10-CM

## 2019-05-25 DIAGNOSIS — L97.929 ULCERS OF BOTH LOWER LEGS (HCC): ICD-10-CM

## 2019-05-25 DIAGNOSIS — L97.919 ULCERS OF BOTH LOWER LEGS (HCC): ICD-10-CM

## 2019-05-25 PROBLEM — R77.8 ELEVATED TROPONIN: Status: RESOLVED | Noted: 2019-04-13 | Resolved: 2019-05-25

## 2019-05-25 PROBLEM — J90 PLEURAL EFFUSION: Status: RESOLVED | Noted: 2019-04-12 | Resolved: 2019-05-25

## 2019-05-25 PROBLEM — R29.810 FACIAL DROOP: Status: RESOLVED | Noted: 2019-04-13 | Resolved: 2019-05-25

## 2019-05-25 PROBLEM — L03.90 CELLULITIS: Status: ACTIVE | Noted: 2019-05-25

## 2019-05-25 LAB
ALBUMIN SERPL-MCNC: 2.1 G/DL (ref 3.4–5)
ALBUMIN/GLOB SERPL: 0.7 {RATIO} (ref 0.8–1.7)
ALP SERPL-CCNC: 63 U/L (ref 45–117)
ALT SERPL-CCNC: 21 U/L (ref 13–56)
AMMONIA PLAS-SCNC: 28 UMOL/L (ref 11–32)
ANION GAP SERPL CALC-SCNC: 15 MMOL/L (ref 3–18)
APPEARANCE UR: ABNORMAL
AST SERPL-CCNC: 21 U/L (ref 15–37)
BACTERIA URNS QL MICRO: ABNORMAL /HPF
BASOPHILS # BLD: 0 K/UL (ref 0–0.1)
BASOPHILS NFR BLD: 0 % (ref 0–3)
BILIRUB SERPL-MCNC: 3.8 MG/DL (ref 0.2–1)
BILIRUB UR QL: ABNORMAL
BNP SERPL-MCNC: ABNORMAL PG/ML (ref 0–1800)
BUN SERPL-MCNC: 43 MG/DL (ref 7–18)
BUN/CREAT SERPL: 34 (ref 12–20)
CALCIUM SERPL-MCNC: 8.5 MG/DL (ref 8.5–10.1)
CHLORIDE SERPL-SCNC: 107 MMOL/L (ref 100–108)
CK MB CFR SERPL CALC: 1.3 % (ref 0–4)
CK MB SERPL-MCNC: 1.2 NG/ML (ref 5–25)
CK SERPL-CCNC: 96 U/L (ref 26–192)
CO2 SERPL-SCNC: 23 MMOL/L (ref 21–32)
COLOR UR: ABNORMAL
CREAT SERPL-MCNC: 1.27 MG/DL (ref 0.6–1.3)
DIFFERENTIAL METHOD BLD: ABNORMAL
EOSINOPHIL # BLD: 0 K/UL (ref 0–0.4)
EOSINOPHIL NFR BLD: 0 % (ref 0–5)
EPITH CASTS URNS QL MICRO: ABNORMAL /LPF (ref 0–5)
ERYTHROCYTE [DISTWIDTH] IN BLOOD BY AUTOMATED COUNT: 18.7 % (ref 11.6–14.5)
EST. AVERAGE GLUCOSE BLD GHB EST-MCNC: 137 MG/DL
GLOBULIN SER CALC-MCNC: 3.1 G/DL (ref 2–4)
GLUCOSE SERPL-MCNC: 202 MG/DL (ref 74–99)
GLUCOSE UR STRIP.AUTO-MCNC: NEGATIVE MG/DL
HBA1C MFR BLD: 6.4 % (ref 4.2–5.6)
HCT VFR BLD AUTO: 32.2 % (ref 35–45)
HGB BLD-MCNC: 10.4 G/DL (ref 12–16)
HGB UR QL STRIP: NEGATIVE
KETONES UR QL STRIP.AUTO: NEGATIVE MG/DL
LACTATE BLD-SCNC: 4.31 MMOL/L (ref 0.4–2)
LACTATE BLD-SCNC: 4.68 MMOL/L (ref 0.4–2)
LEUKOCYTE ESTERASE UR QL STRIP.AUTO: ABNORMAL
LIPASE SERPL-CCNC: 30 U/L (ref 73–393)
LYMPHOCYTES # BLD: 0.2 K/UL (ref 0.8–3.5)
LYMPHOCYTES NFR BLD: 4 % (ref 20–51)
MAGNESIUM SERPL-MCNC: 2 MG/DL (ref 1.6–2.6)
MCH RBC QN AUTO: 28.7 PG (ref 24–34)
MCHC RBC AUTO-ENTMCNC: 32.3 G/DL (ref 31–37)
MCV RBC AUTO: 88.7 FL (ref 74–97)
METAMYELOCYTES NFR BLD MANUAL: 2 %
MONOCYTES # BLD: 0 K/UL (ref 0–1)
MONOCYTES NFR BLD: 1 % (ref 2–9)
NEUTS BAND NFR BLD MANUAL: 30 % (ref 0–5)
NEUTS SEG # BLD: 2.8 K/UL (ref 1.8–8)
NEUTS SEG NFR BLD: 63 % (ref 42–75)
NITRITE UR QL STRIP.AUTO: NEGATIVE
PH UR STRIP: 5 [PH] (ref 5–8)
PLATELET # BLD AUTO: 102 K/UL (ref 135–420)
PLATELET COMMENTS,PCOM: ABNORMAL
PMV BLD AUTO: 10.9 FL (ref 9.2–11.8)
POTASSIUM SERPL-SCNC: 3.7 MMOL/L (ref 3.5–5.5)
PROT SERPL-MCNC: 5.2 G/DL (ref 6.4–8.2)
PROT UR STRIP-MCNC: 30 MG/DL
RBC # BLD AUTO: 3.63 M/UL (ref 4.2–5.3)
RBC #/AREA URNS HPF: ABNORMAL /HPF (ref 0–5)
RBC MORPH BLD: ABNORMAL
SODIUM SERPL-SCNC: 145 MMOL/L (ref 136–145)
SP GR UR REFRACTOMETRY: 1.02 (ref 1–1.03)
TROPONIN I SERPL-MCNC: 0.05 NG/ML (ref 0–0.04)
UROBILINOGEN UR QL STRIP.AUTO: 2 EU/DL (ref 0.2–1)
WBC # BLD AUTO: 4.5 K/UL (ref 4.6–13.2)
WBC MORPH BLD: ABNORMAL
WBC URNS QL MICRO: ABNORMAL /HPF (ref 0–5)

## 2019-05-25 PROCEDURE — 83605 ASSAY OF LACTIC ACID: CPT

## 2019-05-25 PROCEDURE — 99285 EMERGENCY DEPT VISIT HI MDM: CPT

## 2019-05-25 PROCEDURE — 85025 COMPLETE CBC W/AUTO DIFF WBC: CPT

## 2019-05-25 PROCEDURE — 70450 CT HEAD/BRAIN W/O DYE: CPT

## 2019-05-25 PROCEDURE — 80053 COMPREHEN METABOLIC PANEL: CPT

## 2019-05-25 PROCEDURE — 87077 CULTURE AEROBIC IDENTIFY: CPT

## 2019-05-25 PROCEDURE — 83690 ASSAY OF LIPASE: CPT

## 2019-05-25 PROCEDURE — 93005 ELECTROCARDIOGRAM TRACING: CPT

## 2019-05-25 PROCEDURE — 74011000250 HC RX REV CODE- 250: Performed by: EMERGENCY MEDICINE

## 2019-05-25 PROCEDURE — 83880 ASSAY OF NATRIURETIC PEPTIDE: CPT

## 2019-05-25 PROCEDURE — 83735 ASSAY OF MAGNESIUM: CPT

## 2019-05-25 PROCEDURE — 87186 SC STD MICRODIL/AGAR DIL: CPT

## 2019-05-25 PROCEDURE — 74011250636 HC RX REV CODE- 250/636: Performed by: EMERGENCY MEDICINE

## 2019-05-25 PROCEDURE — 96375 TX/PRO/DX INJ NEW DRUG ADDON: CPT

## 2019-05-25 PROCEDURE — 65270000029 HC RM PRIVATE

## 2019-05-25 PROCEDURE — 83036 HEMOGLOBIN GLYCOSYLATED A1C: CPT

## 2019-05-25 PROCEDURE — 71045 X-RAY EXAM CHEST 1 VIEW: CPT

## 2019-05-25 PROCEDURE — 96365 THER/PROPH/DIAG IV INF INIT: CPT

## 2019-05-25 PROCEDURE — 87040 BLOOD CULTURE FOR BACTERIA: CPT

## 2019-05-25 PROCEDURE — 82140 ASSAY OF AMMONIA: CPT

## 2019-05-25 PROCEDURE — 81001 URINALYSIS AUTO W/SCOPE: CPT

## 2019-05-25 PROCEDURE — 96366 THER/PROPH/DIAG IV INF ADDON: CPT

## 2019-05-25 PROCEDURE — 82550 ASSAY OF CK (CPK): CPT

## 2019-05-25 RX ORDER — BUMETANIDE 0.5 MG/1
0.5 TABLET ORAL DAILY
COMMUNITY

## 2019-05-25 RX ORDER — SODIUM CHLORIDE 0.9 % (FLUSH) 0.9 %
5-10 SYRINGE (ML) INJECTION AS NEEDED
Status: DISCONTINUED | OUTPATIENT
Start: 2019-05-25 | End: 2019-05-31 | Stop reason: HOSPADM

## 2019-05-25 RX ORDER — VANCOMYCIN/0.9 % SOD CHLORIDE 1.5G/250ML
1500 PLASTIC BAG, INJECTION (ML) INTRAVENOUS
Status: DISCONTINUED | OUTPATIENT
Start: 2019-05-27 | End: 2019-05-28

## 2019-05-25 RX ORDER — DEXTROSE 50 % IN WATER (D50W) INTRAVENOUS SYRINGE
25-50 AS NEEDED
Status: DISCONTINUED | OUTPATIENT
Start: 2019-05-25 | End: 2019-05-31 | Stop reason: HOSPADM

## 2019-05-25 RX ORDER — FUROSEMIDE 40 MG/1
20 TABLET ORAL 2 TIMES DAILY
Status: CANCELLED | OUTPATIENT
Start: 2019-05-25

## 2019-05-25 RX ORDER — ENOXAPARIN SODIUM 100 MG/ML
30 INJECTION SUBCUTANEOUS EVERY 24 HOURS
Status: DISCONTINUED | OUTPATIENT
Start: 2019-05-25 | End: 2019-05-28

## 2019-05-25 RX ORDER — VANCOMYCIN/0.9 % SOD CHLORIDE 1.5G/250ML
1500 PLASTIC BAG, INJECTION (ML) INTRAVENOUS ONCE
Status: COMPLETED | OUTPATIENT
Start: 2019-05-25 | End: 2019-05-25

## 2019-05-25 RX ORDER — MAGNESIUM SULFATE 100 %
4 CRYSTALS MISCELLANEOUS AS NEEDED
Status: DISCONTINUED | OUTPATIENT
Start: 2019-05-25 | End: 2019-05-31 | Stop reason: HOSPADM

## 2019-05-25 RX ORDER — INSULIN LISPRO 100 [IU]/ML
INJECTION, SOLUTION INTRAVENOUS; SUBCUTANEOUS
Status: DISCONTINUED | OUTPATIENT
Start: 2019-05-26 | End: 2019-05-31 | Stop reason: HOSPADM

## 2019-05-25 RX ORDER — VANCOMYCIN/0.9 % SOD CHLORIDE 1.5G/250ML
1500 PLASTIC BAG, INJECTION (ML) INTRAVENOUS EVERY 24 HOURS
Status: DISCONTINUED | OUTPATIENT
Start: 2019-05-25 | End: 2019-05-25 | Stop reason: DRUGHIGH

## 2019-05-25 RX ADMIN — CEFTRIAXONE 1 G: 1 INJECTION, POWDER, FOR SOLUTION INTRAMUSCULAR; INTRAVENOUS at 21:14

## 2019-05-25 RX ADMIN — VANCOMYCIN HYDROCHLORIDE 1500 MG: 10 INJECTION, POWDER, LYOPHILIZED, FOR SOLUTION INTRAVENOUS at 21:17

## 2019-05-25 RX ADMIN — SODIUM CHLORIDE 500 ML: 900 INJECTION, SOLUTION INTRAVENOUS at 21:14

## 2019-05-26 ENCOUNTER — APPOINTMENT (OUTPATIENT)
Dept: CT IMAGING | Age: 84
DRG: 871 | End: 2019-05-26
Attending: HOSPITALIST
Payer: MEDICARE

## 2019-05-26 PROBLEM — D69.6 THROMBOCYTOPENIA (HCC): Status: ACTIVE | Noted: 2019-05-26

## 2019-05-26 PROBLEM — R78.81 BACTEREMIA: Status: ACTIVE | Noted: 2019-05-26

## 2019-05-26 LAB
ANION GAP SERPL CALC-SCNC: 11 MMOL/L (ref 3–18)
ATRIAL RATE: 51 BPM
BUN SERPL-MCNC: 43 MG/DL (ref 7–18)
BUN/CREAT SERPL: 38 (ref 12–20)
CALCIUM SERPL-MCNC: 8.2 MG/DL (ref 8.5–10.1)
CALCULATED R AXIS, ECG10: -45 DEGREES
CALCULATED T AXIS, ECG11: 89 DEGREES
CHLORIDE SERPL-SCNC: 109 MMOL/L (ref 100–108)
CK MB CFR SERPL CALC: 1.9 % (ref 0–4)
CK MB CFR SERPL CALC: 2.1 % (ref 0–4)
CK MB SERPL-MCNC: 2 NG/ML (ref 5–25)
CK MB SERPL-MCNC: 2.3 NG/ML (ref 5–25)
CK SERPL-CCNC: 103 U/L (ref 26–192)
CK SERPL-CCNC: 108 U/L (ref 26–192)
CO2 SERPL-SCNC: 24 MMOL/L (ref 21–32)
CREAT SERPL-MCNC: 1.14 MG/DL (ref 0.6–1.3)
DIAGNOSIS, 93000: NORMAL
ERYTHROCYTE [DISTWIDTH] IN BLOOD BY AUTOMATED COUNT: 18.8 % (ref 11.6–14.5)
GLUCOSE BLD STRIP.AUTO-MCNC: 143 MG/DL (ref 70–110)
GLUCOSE BLD STRIP.AUTO-MCNC: 229 MG/DL (ref 70–110)
GLUCOSE BLD STRIP.AUTO-MCNC: 83 MG/DL (ref 70–110)
GLUCOSE SERPL-MCNC: 115 MG/DL (ref 74–99)
HCT VFR BLD AUTO: 29.7 % (ref 35–45)
HGB BLD-MCNC: 9.5 G/DL (ref 12–16)
LACTATE SERPL-SCNC: 2.7 MMOL/L (ref 0.4–2)
LACTATE SERPL-SCNC: 3.4 MMOL/L (ref 0.4–2)
LACTATE SERPL-SCNC: 3.6 MMOL/L (ref 0.4–2)
LACTATE SERPL-SCNC: 4.3 MMOL/L (ref 0.4–2)
MCH RBC QN AUTO: 28.4 PG (ref 24–34)
MCHC RBC AUTO-ENTMCNC: 32 G/DL (ref 31–37)
MCV RBC AUTO: 88.7 FL (ref 74–97)
PLATELET # BLD AUTO: 90 K/UL (ref 135–420)
PMV BLD AUTO: 11.6 FL (ref 9.2–11.8)
POTASSIUM SERPL-SCNC: 3.5 MMOL/L (ref 3.5–5.5)
Q-T INTERVAL, ECG07: 358 MS
QRS DURATION, ECG06: 92 MS
QTC CALCULATION (BEZET), ECG08: 420 MS
RBC # BLD AUTO: 3.35 M/UL (ref 4.2–5.3)
SODIUM SERPL-SCNC: 144 MMOL/L (ref 136–145)
TROPONIN I SERPL-MCNC: 0.04 NG/ML (ref 0–0.04)
TROPONIN I SERPL-MCNC: 0.05 NG/ML (ref 0–0.04)
VENTRICULAR RATE, ECG03: 83 BPM
WBC # BLD AUTO: 3.8 K/UL (ref 4.6–13.2)

## 2019-05-26 PROCEDURE — 65660000000 HC RM CCU STEPDOWN

## 2019-05-26 PROCEDURE — 82550 ASSAY OF CK (CPK): CPT

## 2019-05-26 PROCEDURE — 74011250636 HC RX REV CODE- 250/636: Performed by: HOSPITALIST

## 2019-05-26 PROCEDURE — 74011000258 HC RX REV CODE- 258: Performed by: HOSPITALIST

## 2019-05-26 PROCEDURE — 87040 BLOOD CULTURE FOR BACTERIA: CPT

## 2019-05-26 PROCEDURE — 80048 BASIC METABOLIC PNL TOTAL CA: CPT

## 2019-05-26 PROCEDURE — 74011636637 HC RX REV CODE- 636/637: Performed by: HOSPITALIST

## 2019-05-26 PROCEDURE — 74011636320 HC RX REV CODE- 636/320: Performed by: HOSPITALIST

## 2019-05-26 PROCEDURE — 82962 GLUCOSE BLOOD TEST: CPT

## 2019-05-26 PROCEDURE — 85027 COMPLETE CBC AUTOMATED: CPT

## 2019-05-26 PROCEDURE — 83605 ASSAY OF LACTIC ACID: CPT

## 2019-05-26 PROCEDURE — 74177 CT ABD & PELVIS W/CONTRAST: CPT

## 2019-05-26 PROCEDURE — 36415 COLL VENOUS BLD VENIPUNCTURE: CPT

## 2019-05-26 PROCEDURE — 74011250637 HC RX REV CODE- 250/637: Performed by: HOSPITALIST

## 2019-05-26 RX ORDER — BUMETANIDE 1 MG/1
0.5 TABLET ORAL DAILY
Status: DISCONTINUED | OUTPATIENT
Start: 2019-05-26 | End: 2019-05-31 | Stop reason: HOSPADM

## 2019-05-26 RX ORDER — CARVEDILOL 12.5 MG/1
12.5 TABLET ORAL 2 TIMES DAILY
Status: DISCONTINUED | OUTPATIENT
Start: 2019-05-26 | End: 2019-05-31 | Stop reason: HOSPADM

## 2019-05-26 RX ADMIN — DIATRIZOATE MEGLUMINE AND DIATRIZOATE SODIUM 30 ML: 660; 100 LIQUID ORAL; RECTAL at 11:47

## 2019-05-26 RX ADMIN — PIPERACILLIN SODIUM,TAZOBACTAM SODIUM 3.38 G: 3; .375 INJECTION, POWDER, FOR SOLUTION INTRAVENOUS at 09:47

## 2019-05-26 RX ADMIN — PIPERACILLIN SODIUM,TAZOBACTAM SODIUM 3.38 G: 3; .375 INJECTION, POWDER, FOR SOLUTION INTRAVENOUS at 16:20

## 2019-05-26 RX ADMIN — SODIUM CHLORIDE 500 ML: 900 INJECTION, SOLUTION INTRAVENOUS at 16:38

## 2019-05-26 RX ADMIN — BUMETANIDE 0.5 MG: 1 TABLET ORAL at 08:38

## 2019-05-26 RX ADMIN — PIPERACILLIN SODIUM,TAZOBACTAM SODIUM 3.38 G: 3; .375 INJECTION, POWDER, FOR SOLUTION INTRAVENOUS at 21:52

## 2019-05-26 RX ADMIN — SODIUM CHLORIDE 1000 MG: 900 INJECTION, SOLUTION INTRAVENOUS at 02:13

## 2019-05-26 RX ADMIN — IOPAMIDOL 100 ML: 612 INJECTION, SOLUTION INTRAVENOUS at 13:36

## 2019-05-26 RX ADMIN — CARVEDILOL 12.5 MG: 12.5 TABLET, FILM COATED ORAL at 21:53

## 2019-05-26 RX ADMIN — ENOXAPARIN SODIUM 30 MG: 30 INJECTION SUBCUTANEOUS at 02:12

## 2019-05-26 RX ADMIN — INSULIN LISPRO 4 UNITS: 100 INJECTION, SOLUTION INTRAVENOUS; SUBCUTANEOUS at 21:54

## 2019-05-26 NOTE — PROGRESS NOTES
0000 Pt arrived to the unit. Pt was dropped off by ED tech. 0007 Admission assessment completed. Pt states that Dr. Jazmin Rojas does not want the dressings removed. BLE appear to be weeping and have a foul smelling odor. 0715 Bedside and Verbal shift change report given to NORA Kemp (oncoming nurse) by Alfred Sanchez RN   (offgoing nurse). Report included the following information SBAR, Kardex, Procedure Summary, Intake/Output, MAR, Recent Results and Med Rec Status.

## 2019-05-26 NOTE — PROGRESS NOTES
Problem: Falls - Risk of  Goal: *Absence of Falls  Description  Document Ezequiel Khalida Fall Risk and appropriate interventions in the flowsheet. Outcome: Progressing Towards Goal     Problem: Pressure Injury - Risk of  Goal: *Prevention of pressure injury  Description  Document Ignacio Scale and appropriate interventions in the flowsheet.   Outcome: Progressing Towards Goal     Problem: Cellulitis Care Plan (Adult)  Goal: *Control of acute pain  Outcome: Progressing Towards Goal  Goal: *Skin integrity maintained  Outcome: Progressing Towards Goal  Goal: *Absence of infection signs and symptoms  Outcome: Progressing Towards Goal

## 2019-05-26 NOTE — ROUTINE PROCESS
0700: Assumed care of pt Zoraida Preston RN. 
0900: pt resting no sign of distress. 1030: Pt resting no sign of distress. 1130: [place] given for CT. 
1330: pt off floor for CT. 
1500: Pt back to floor family at bedside pt eating dinner. 1700: pt restless want to go home explained that pt needed to stay to receive tx.

## 2019-05-26 NOTE — H&P
[facility]  HISTORY AND PHYSICAL    Name:  Ilan Baez  MR#:   [id number]  :  1926  ACCOUNT #:  [de-identified]  ADMIT DATE:  2019    PCP:  Letitia Short MD    CODE STATUS:  DNR. ADMITTING DIAGNOSES:  1. Cellulitis of the right leg. 2.  Metabolic encephalopathy. 3.  Chronic diastolic heart failure. 4.  Chronic lower extremity wounds and lymphedema. 5.  Hypertension. 6.  Chronic atrial fibrillation. 7.  History of breast cancer. HISTORY OF PRESENT ILLNESS:  The patient is 80years old. She has an extensive medical history. She has a do not resuscitate in place. She was at [address] [place] [address] earlier this year after having a hospitalization here for heart failure. She has chronic lower extremity ulcerations and lymphedema, and these [facility]. She came into the hospital recently and had an x-ray of her right shoulder for complaints of pain. There was no acute fracture. She was given hydrocodone. She started taking the hydrocodone at home, but was brought back tonight to the emergency room because she was being confused at home, and her daughter states she could not complete her basic ADLs. They thought that may be the pain medicine, but on further examination, she has an elevated lactate and cellulitis of the right leg that tracks up to the right thigh. She is being admitted for further treatment. PAST MEDICAL HISTORY:  AFib; chronic diastolic heart failure; chronic pain; breast cancer; diabetes, diet controlled; hypertension. PREVIOUS SURGERIES:  Breast biopsy, hysterectomy. FAMILY HISTORY:  Extensive breast cancer history in aunt and grandmother. SOCIAL HISTORY:  Nonsmoker, nondrinker. Lives at home with her daughter. ALLERGIES:  SHE HAS AN ALLERGY TO LISINOPRIL, NORCO, SULFA, AND PETROLATUM.     REVIEW OF SYSTEMS:  Constitutional:  She denies any fevers, but she has had some chills and feeling cold the last 2 days. Respiratory:  She complains of shortness of breath, but she has had no respiratory distress. Per her daughter, no productive cough. Cardiovascular:  No chest pain. She does have chronic leg swelling. GI:  No nausea, vomiting, diarrhea. Genitourinary:  No dysuria or hematuria. Musculoskeletal:  She has lower extremity pain, primarily in her toes and feet. PHYSICAL EXAMINATION:  GENERAL:  On examination, she is awake and alert, in no acute distress, elderly appearing, debilitated,  female. VITAL SIGNS:  Blood pressure 100/53, pulse 85, temperature 98, respiratory rate 24, SAO2 is 100% on room air. HEENT:  Sclerae, mildly icteric. Oropharynx dry. No lesions. NECK:  Supple. No thyromegaly or lymphadenopathy. LUNGS:  Diminished breath sounds at the bases, but no rales or rhonchi. No wheezing. CARDIAC EXAM:  Irregular rhythm, regular rate. No murmur noted. ABDOMEN:  Soft, nontender, no distention, no ascites. LOWER EXTREMITIES:  Chronic swelling and lymphedema changes of the lower extremities and feet, dressed with wraps in place. There is streaking cellulitis of the medial right thigh and across the anterior portion of the right thigh to the groin that is red and warm. DIAGNOSTIC DATA:  Her EKG shows AFib, left axis deviation. She had a chest x-ray which is not read at this point. LABORATORY DATA:  Her metabolic panel shows a creatinine of 1.27 and glucose was 202. Her AST and ALT are normal.  Her total bilirubin is elevated at 3.8. Her hemoglobin A1c is 6.4%, ammonia 28, troponin 0.05. BNP is 17,206. A urinalysis shows small leukocyte esterase, 33 wbc's. White count is 4.5, H and H are 10.4 and 32.2. Her platelets are 790,228. MEDICATIONS:  At home, of note, include Coreg, Bumex, and the Norco she had been taking. ASSESSMENT:  1. Cellulitis of the right lower extremity.   2.  Sepsis due to the cellulitis with elevated lactate and confusion, and elevation in her creatinine with acute kidney injury. 3.  Chronic diastolic heart failure. 4.  Diabetes mellitus, diet controlled. 5.  Scleral icterus. 6.  Hypertension. 7.  History of breast cancer. PLAN:  We are going to put her on Lovenox 30 mg subcu daily for DVT prophylaxis. She has borderline platelets at this point. If they get any lower, we will have to hold the Lovenox. Her platelets look like they are chronically in the 100 range. She is at high risk for thromboembolism. We cannot put SCDs in her due to her leg wraps. We will place her on a diabetic diet with Accu-Cheks a.c. and at bedtime, sliding scale insulin as needed. Continue antibiotics to include Ancef and vancomycin, and track the clinical course and response of the cellulitis of the right upper leg. She needs serial lactates to be done every 4 hours. She did receive a fluid bolus in the emergency room, but not a full weight-based bolus due to her elevated BNP consistent with heart failure and chronic edema changes secondary to her diastolic heart failure. We will repeat a CBC and metabolic panel in the morning. Her bilirubin is elevated. I do not know why. Her AST and ALT are okay. I would recommend that we get a CT of her abdomen to see if there is biliary involvement causing her icterus and jaundice. She can go to a medical bed, most likely with cardiac monitoring due to her heart failure history. Diabetic diet for her. She has a do not resuscitate. We will consult pharmacy for vancomycin dosing, and I have reconciled her home medications which are appropriate to continue.   Expect 72 hours in the hospital.      Danielle Paget, MD      [id number]  D:  05/25/2019 23:41  T:  05/25/2019 23:51  JOB #:  [id number]

## 2019-05-26 NOTE — WOUND CARE
Wound Care Progress Note New consult placed by Dr. Rolanad Phillips for [place] [facility]" Assessment:  
Communication: A&O x 4 communicative Wearing briefs for incontinence Requires partial assists in repositioning. Diet: diabetic Complaints of pain only when right leg is dressed and repositioned Bilateral heel, buttocks, and sacral skin intact and without erythema. Generalized edema and blanching erythema to lower legs and feet. 1. POA VLU, left leg (wound location & etiology) = 8 x 4 x 0.1 cm Base is 50% of granulation tissue, 50% fibrinous tissue. Large serous exudate. Periwound intact & without erythema. Margins are attached. Treatment: xeroform, abd, kerlix, ace wrap in light compressive fashion 2. POA VLU,right leg (wound location & etiology) = 6 x 5 x 0.1 cm Base is 100% of granulation tissue. Large serous exudate. Periwound intact & without erythema. Margins are attached. Treatment: xeroform, abd, kerlix, ace wrap in light compressive fashion Wound Recommendations: Absorptive dressing, and lightly compressive ace to open areas per previous clinic orders, change every other day or as needed for drainage. Skin Care & Pressure Relief Recommendations: 
Minimize layers of linen/pads under patient to optimize support surface. Turn/reposition approximately every 2 hours and offload heels pillows or Prevalon boots. Manage incontinence / promote continence; Proshield to buttocks and sacrum daily and as needed with incontinence care Discussed above plan with patient & Megan WATERS Transition of Care: Plan to follow weekly and per product recommendations while admitted to hospital.

## 2019-05-26 NOTE — PROGRESS NOTES
Pharmacy Dosing Services: Vancomycin     Consult for Vancomycin Dosing by Pharmacy by Dr. Pohebe Van  Consult provided for this 80y.o. year old female , for indication of Skin and Soft Tissue Infection (5 days). Day of Therapy 1    Ht Readings from Last 1 Encounters:   05/25/19 167.6 cm (66\")        Wt Readings from Last 1 Encounters:   05/25/19 88.9 kg (196 lb)      Other Current Antibiotics Cefazolin 1 g IV every 8 hours   Significant Cultures Pending   Serum Creatinine Lab Results   Component Value Date/Time    Creatinine 1.27 05/25/2019 08:35 PM        Creatinine Clearance Estimated Creatinine Clearance: 31.7 mL/min (based on SCr of 1.27 mg/dL). BUN Lab Results   Component Value Date/Time    BUN 43 (H) 05/25/2019 08:35 PM        WBC Lab Results   Component Value Date/Time    WBC 4.5 (L) 05/25/2019 08:35 PM        H/H Lab Results   Component Value Date/Time    HGB 10.4 (L) 05/25/2019 08:35 PM        Platelets Lab Results   Component Value Date/Time    PLATELET 475 (L) 54/52/7907 08:35 PM        Temp 98 °F (36.7 °C)     Start Vancomycin therapy, with loading dose of 1500 mg at 2100 5/25/19. Follow with maintenance dose of 1500 mg at 0900 5/27/19, every 36 hours. Dose calculated to approximate a therapeutic trough of 10-15 mcg/mL. Pharmacy to follow daily and will make changes to dose and/or frequency based on clinical status.       Pharmacist [id number] [facility] [address] Jaya Duke

## 2019-05-26 NOTE — PROGRESS NOTES
DC Plan: SNF    Chart reviewed as CM on call. Pt admitted for cellulitis. Met with pt and pts daughter, Saundra Gold at bedside. Pt lives at [address]. Pt has an electric scooter, RW and rollator at home. Discussed transition of care at length with daughter. She would like pt to go to SNF at discharge. FOC offered and she chose [facility] or [facility]. Referrals placed. She also states pt may now require LTC after being skilled. State she is going to check into pts resources to see what she can afford. Will need PT/OT eval and recommendations. Also, pt active with [facility]. CM will cont to follow for transition of care needs. Reason for Admission:   Per H&P, pt is \" The patient is 80years old. She has an extensive medical history. She has a do not resuscitate in place. She was at [address] [place] [address] earlier this year after having a hospitalization here for heart failure. She has chronic lower extremity ulcerations and lymphedema, and these were [facility]. She came into the hospital recently and had an x-ray of her right shoulder for complaints of pain. There was no acute fracture. She was given hydrocodone. She started taking the hydrocodone at home, but was brought back tonight to the emergency room because she was being confused at home, and her daughter states she could not complete her basic ADLs. They thought that may be the pain medicine, but on further examination, she has an elevated lactate and cellulitis of the right leg that tracks up to the right thigh. She is being admitted for further treatment. \"    RRAT Score:     HIGH 24             Resources/supports as identified by patient/family:   Not being able to return to [address] [place] [address] Road facing patient (as identified by patient/family and CM): Finances/Medication cost?                    Transportation?   Family, [employer] system or lack thereof? Has daughter and son whom live nearby, another son in [place]                     Living arrangements? [address]             Self-care/ADLs/Cognition? Alert, needs assistance with ADLs          Current Advanced Directive/Advance Care Plan:  Not on file                          Plan for utilizing home health: Active with [facility]                       Likelihood of readmission: HIGH                 Transition of Care Plan:      SNF to LTC? Care Management Interventions  Mode of Transport at Discharge: S  Transition of Care Consult (CM Consult): SNF, Discharge Planning  Physical Therapy Consult: Yes  Current Support Network:  Other(lives @ [address])  Confirm Follow Up Transport: Family  Plan discussed with Pt/Family/Caregiver: Yes  Freedom of Choice Offered: Yes  Discharge Location  Discharge Placement: Skilled nursing facility

## 2019-05-26 NOTE — PROGRESS NOTES
Hospitalist Progress Note-critical care note     Patient: Audrey Rodriguez MRN: [id number]  CSN: [id number]    YOB: 1926  Age: 80 y.o. Sex: female    DOA: 5/25/2019 LOS:  LOS: 1 day            Chief complaint: bacteremia , cellulitis, sepsis     Assessment/Plan         Hospital Problems  Date Reviewed: 4/13/2019          Codes Class Noted POA    Bacteremia ICD-10-CM: R78.81  ICD-9-CM: 790.7  5/26/2019 Unknown        * (Principal) Cellulitis ICD-10-CM: L03.90  ICD-9-CM: 682.9  5/25/2019 Yes        Bandemia ICD-10-CM: X32.457  ICD-9-CM: 288.66  5/25/2019 Yes        Sepsis ([facility] ICD-10-CM: A41.9  ICD-9-CM: 038.9, 995.91  5/25/2019 Yes        Diastolic CHF, acute on chronic [facility]) ICD-10-CM: I50.33  ICD-9-CM: 428.33, 428.0  4/15/2019 Yes        History of stroke ICD-10-CM: Z86.73  ICD-9-CM: V12.54  4/15/2019 Yes        Diabetes mellitus type 2, diet-controlled ([facility] ICD-10-CM: E11.9  ICD-9-CM: 250.00  4/13/2019 Yes        CHF (congestive heart failure) (HCC) ICD-10-CM: I50.9  ICD-9-CM: 428.0  4/12/2019 Yes        Chronic atrial fibrillation (HCC) ICD-10-CM: I48.2  ICD-9-CM: 427.31  4/12/2019 Yes              Bacteremia   Will repeat bcx, change iv abx to vanc and zosyn  Will f/u with the cx results and sensitivity, will narrow the abx   Will have abdomen ct look for other source     Sepsis   Due to bacteremia, cellulitis, will have ct abdomen for other source of infection   Will f/u with lactic acid, received iv bolus in er     Elevated trop   Will trace the trend, no chest pain     Cellulitis of legs   Local wound care ,continue iv abx , symptom treatment   Will have pvl r/o dvt    Chronic a fib   Persistent, chronic  Rate control per coreg , not on anticoagulant     DM type II diet control   ssi and hypoglycemia protocol      hx of stroke   Fall precaution and aspiration precaution     Thrombocytopenia   Continue to monitoring, high risk for dvt, need lovenox     chf , diastolic.  So far stable , need watch sign of fluid overloaded       Subjective: feel the same   rn : amaury positive         Disposition :tbd,   Review of systems:    General: No fevers or chills. Tired   Cardiovascular: No chest pain or pressure. No palpitations. Pulmonary: No shortness of breath. Gastrointestinal: No nausea, vomiting  Msk: pain in the leg. Vital signs/Intake and Output:  Visit Vitals  /58   Pulse (!) 105   Temp 98.1 °F (36.7 °C)   Resp 18   Ht 5' 6\" (1.676 m)   Wt 81.8 kg (180 lb 6.4 oz)   SpO2 99%   BMI 29.12 kg/m²     Current Shift:  No intake/output data recorded. Last three shifts:  05/24 1901 - 05/26 0700  In: 500 [I.V.:500]  Out: -     Physical Exam:  General: WD, WN. Alert, cooperative, no acute distress    HEENT: NC, Atraumatic. PERRLA, icteric sclerae. Lungs: CTA Bilaterally. No Wheezing/Rhonchi/Rales. Heart:  Irregular irregular ,  + murmur, No Rubs, No Gallops  Abdomen: Soft, Non distended, Non tender.  +Bowel sounds,   Extremities: No c/c, bilateral legs wrapped with ace bandage, hot on touch, rt worse than left   Psych:   Not anxious or agitated. Neurologic:  No acute neurological deficit. Labs: Results:       Chemistry Recent Labs     05/26/19 0159 05/25/19 2035   * 202*    145   K 3.5 3.7   * 107   CO2 24 23   BUN 43* 43*   CREA 1.14 1.27   CA 8.2* 8.5   AGAP 11 15   BUCR 38* 34*   AP  --  63   TP  --  5.2*   ALB  --  2.1*   GLOB  --  3.1   AGRAT  --  0.7*      CBC w/Diff Recent Labs     05/26/19 0159 05/25/19 2035   WBC 3.8* 4.5*   RBC 3.35* 3.63*   HGB 9.5* 10.4*   HCT 29.7* 32.2*   PLT 90* 102*   GRANS  --  63   LYMPH  --  4*   EOS  --  0      Cardiac Enzymes Recent Labs     05/25/19 2035   CPK 96   CKND1 1.3      Coagulation No results for input(s): PTP, INR, APTT in the last 72 hours.     No lab exists for component: INREXT    Lipid Panel Lab Results   Component Value Date/Time    Cholesterol, total 140 04/06/2011 03:50 AM    HDL Cholesterol 64 (H) 04/06/2011 03:50 AM    LDL, calculated 70.4 04/06/2011 03:50 AM    VLDL, calculated 5.6 04/06/2011 03:50 AM    Triglyceride 28 04/06/2011 03:50 AM    CHOL/HDL Ratio 2.2 04/06/2011 03:50 AM      BNP No results for input(s): BNPP in the last 72 hours.    Liver Enzymes Recent Labs     05/25/19 2035   TP 5.2*   ALB 2.1*   AP 63   SGOT 21      Thyroid Studies Lab Results   Component Value Date/Time    TSH 0.78 04/06/2011 03:50 AM        Procedures/imaging: see electronic medical records for all procedures/Xrays and details which were not copied into this note but were reviewed prior to creation of Megan Cervantes MD

## 2019-05-26 NOTE — ED TRIAGE NOTES
Pt brought from home for c/o of altered mental statue since beginning pain medication yesterday.   Pt also has bilateral leg wounds that are wrapped in ace bandages upon arrival

## 2019-05-26 NOTE — ROUTINE PROCESS
Bedside and Verbal shift change report given to BARBARA Roman RN (oncoming nurse) by Herbert Arriaga RN (offgoing nurse). Report included the following information SBAR, Kardex, Intake/Output, MAR and Recent Results.

## 2019-05-26 NOTE — ED NOTES
Attempted to call for notification of SBAR review and pt to be transferred in 20 mins, unable to to give report.

## 2019-05-26 NOTE — PROGRESS NOTES

## 2019-05-26 NOTE — ED PROVIDER NOTES
EMERGENCY DEPARTMENT HISTORY AND PHYSICAL EXAM    Date: 5/25/2019  Patient Name: Bernie Cedillo    History of Presenting Illness     Chief Complaint   Patient presents with    Altered mental status    Wound Check         History Provided By: Patient    Additional History (Context): Bernie Cedillo is a 80 y.o. female with PMHX PMHX A. fib, breast cancer, hypertension, chronic leg wounds, DNR presents to the emergency department via EMS for reported confusion. Patient was seen yesterday for right shoulder pain and had x-rays which showed no acute fractures. It is reported that the patient took a dose of hydrocodone yesterday and since then has been confused. Patient able to answer orientation questions without difficulty. Patient does report shortness of breath. She reports lower extremity pain that is unchanged from her baseline. Pt denies chest pain, abdominal pain, and any other sxs or complaints.      PCP: Avani Moreno MD    Current Facility-Administered Medications   Medication Dose Route Frequency Provider Last Rate Last Dose    sodium chloride (NS) flush 5-10 mL  5-10 mL IntraVENous PRN Agustiady-Becker, Severiano [place], DO        vancomycin (VANCOCIN) 1500 mg in  ml infusion  1,500 mg IntraVENous ONCE Dmitriy Parikh  mL/hr at 05/25/19 2117 1,500 mg at 05/25/19 2117    Vancomycin- Pharmacy to dose  1 Each Other Rx Dosing/Monitoring Agustiady-Becker, Severiano [place], DO        [START ON 5/26/2019] insulin lispro (HUMALOG) injection   SubCUTAneous AC&HS Justin Ca MD        glucose chewable tablet 16 g  4 Tab Oral PRN Justin Ca MD        glucagon [facility]) injection 1 mg  1 mg IntraMUSCular PRN Justin Ca MD        dextrose (D50W) injection syrg 12.5-25 g  25-50 mL IntraVENous PRN Justin Ca MD        ceFAZolin (ANCEF) 1,000 mg in 0.9% sodium chloride (MBP/ADV) 100 mL ADV  1 g IntraVENous Q8H Justin Ca MD         Current Outpatient Medications Medication Sig Dispense Refill    bumetanide (BUMEX) 0.5 mg tablet Take 0.5 mg by mouth daily.  carvedilol (COREG) 12.5 mg tablet Take 12.5 mg by mouth two (2) times a day.  HYDROcodone-acetaminophen (NORCO) 5-325 mg per tablet Take 1 Tab by mouth every six (6) hours as needed for Pain for up to 3 days. Max Daily Amount: 4 Tabs. 12 Tab 0    lidocaine (LIDODERM) 5 % Apply patch to the affected area for 12 hours a day and remove for 12 hours a day. 1 Each 0    furosemide (LASIX) 20 mg tablet Take 20 mg by mouth two (2) times a day. Past History     Past Medical History:  Past Medical History:   Diagnosis Date    [facility]     Breast cancer ([facility] 2014    Chronic pain     Diabetes ([facility]     Hypertension     Menopause        Past Surgical History:  Past Surgical History:   Procedure Laterality Date    HX BREAST BIOPSY      Left br. Benign    HX HYSTERECTOMY      Age 52       Family History:  Family History   Problem Relation Age of Onset    Breast Cancer Maternal Aunt     Breast Cancer Maternal Grandmother        Social History:  Social History     Tobacco Use    Smoking status: Never Smoker    Smokeless tobacco: Never Used   Substance Use Topics    Alcohol use: Not Currently    Drug use: Not Currently       Allergies: Allergies   Allergen Reactions    Lisinopril Cough    Norco [Hydrocodone-Acetaminophen] Other (comments)     AMS per family    Sulfa (Sulfonamide Antibiotics) Rash    Petrolatum,White Rash         Review of Systems   Review of Systems   Constitutional: Negative for chills and fever. HENT: Negative for congestion, ear pain, sinus pain and sore throat. Eyes: Negative for pain and visual disturbance. Respiratory: Positive for shortness of breath. Negative for cough. Cardiovascular: Negative for chest pain and leg swelling. Gastrointestinal: Negative for abdominal pain, constipation, diarrhea, nausea and vomiting.    Genitourinary: Negative for dysuria, hematuria, vaginal bleeding and vaginal discharge. Musculoskeletal: Positive for joint swelling and myalgias. Negative for back pain and neck pain. Skin: Negative for pallor and rash. Neurological: Negative for dizziness, tremors, weakness, light-headedness and headaches. Psychiatric/Behavioral: Positive for confusion and decreased concentration. All other systems reviewed and are negative.       Physical Exam     Vitals:    05/25/19 2029 05/25/19 2130 05/25/19 2301   BP: 117/51  100/53   Pulse: 89 94 85   Resp: 16 (!) 32 24   Temp: 99.8 °F (37.7 °C)  98 °F (36.7 °C)   SpO2: 100% 100% 100%   Weight: 88.9 kg (196 lb)     Height: 5' 6\" (1.676 m)       Physical Exam    Nursing note and vitals reviewed    Constitutional: Elderly -American female, awake and alert, oriented x3  Head: Normocephalic, Atraumatic  Eyes: Pupils are equal, round, and reactive to light, EOMI, scleral icterus  Neck: Supple, non-tender  Cardiovascular: Regular rate and rhythm, no murmurs, rubs, or gallops  Chest: Normal work of breathing and chest excursion bilaterally  Lungs: Clear to ausculation bilaterally, no wheezes, no rhonchi  Abdomen: Soft, non tender, non distended, normoactive bowel sounds  Back: No evidence of trauma or deformity  Extremities: No evidence of trauma or deformity, +3 pitting edema of her lower extremities bilaterally, dressings in place with foul-smelling discharge, right lower extremity with streaking erythema from her lower extremities extending up to her right thigh, warm to touch  Skin: Warm and dry, normal cap refill  Neuro: Alert and appropriate, CN intact, normal speech, moving all 4 extremities freely and symmetrically  Psychiatric: Normal mood and affect       Diagnostic Study Results     Labs -     Recent Results (from the past 12 hour(s))   CBC WITH AUTOMATED DIFF    Collection Time: 05/25/19  8:35 PM   Result Value Ref Range    WBC 4.5 (L) 4.6 - 13.2 K/uL    RBC 3.63 (L) 4.20 - 5.30 M/uL    HGB 10.4 (L) 12.0 - 16.0 g/dL    HCT 32.2 (L) 35.0 - 45.0 %    MCV 88.7 74.0 - 97.0 FL    MCH 28.7 24.0 - 34.0 PG    MCHC 32.3 31.0 - 37.0 g/dL    RDW 18.7 (H) 11.6 - 14.5 %    PLATELET 695 (L) 657 - 420 K/uL    MPV 10.9 9.2 - 11.8 FL    NEUTROPHILS 63 42 - 75 %    BAND NEUTROPHILS 30 (H) 0 - 5 %    LYMPHOCYTES 4 (L) 20 - 51 %    MONOCYTES 1 (L) 2 - 9 %    EOSINOPHILS 0 0 - 5 %    BASOPHILS 0 0 - 3 %    METAMYELOCYTES 2 (H) 0 %    ABS. NEUTROPHILS 2.8 1.8 - 8.0 K/UL    ABS. LYMPHOCYTES 0.2 (L) 0.8 - 3.5 K/UL    ABS. MONOCYTES 0.0 0 - 1.0 K/UL    ABS. EOSINOPHILS 0.0 0.0 - 0.4 K/UL    ABS. BASOPHILS 0.0 0.0 - 0.1 K/UL    PLATELET COMMENTS LARGE PLATELETS      RBC COMMENTS ANISOCYTOSIS  2+        RBC COMMENTS POIKILOCYTOSIS  1+        RBC COMMENTS TARGET CELLS  OCCASIONAL  OVALOCYTES  OCCASIONAL        WBC COMMENTS VACUOLATED POLYS      DF MANUAL     METABOLIC PANEL, COMPREHENSIVE    Collection Time: 05/25/19  8:35 PM   Result Value Ref Range    Sodium 145 136 - 145 mmol/L    Potassium 3.7 3.5 - 5.5 mmol/L    Chloride 107 100 - 108 mmol/L    CO2 23 21 - 32 mmol/L    Anion gap 15 3.0 - 18 mmol/L    Glucose 202 (H) 74 - 99 mg/dL    BUN 43 (H) 7.0 - 18 MG/DL    Creatinine 1.27 0.6 - 1.3 MG/DL    BUN/Creatinine ratio 34 (H) 12 - 20      GFR est AA 48 (L) >60 ml/min/1.73m2    GFR est non-AA 39 (L) >60 ml/min/1.73m2    Calcium 8.5 8.5 - 10.1 MG/DL    Bilirubin, total 3.8 (H) 0.2 - 1.0 MG/DL    ALT (SGPT) 21 13 - 56 U/L    AST (SGOT) 21 15 - 37 U/L    Alk.  phosphatase 63 45 - 117 U/L    Protein, total 5.2 (L) 6.4 - 8.2 g/dL    Albumin 2.1 (L) 3.4 - 5.0 g/dL    Globulin 3.1 2.0 - 4.0 g/dL    A-G Ratio 0.7 (L) 0.8 - 1.7     NT-PRO BNP    Collection Time: 05/25/19  8:35 PM   Result Value Ref Range    NT pro-BNP 17,206 (H) 0 - 1,800 PG/ML   LIPASE    Collection Time: 05/25/19  8:35 PM   Result Value Ref Range    Lipase 30 (L) 73 - 393 U/L   MAGNESIUM    Collection Time: 05/25/19  8:35 PM   Result Value Ref Range    Magnesium 2.0 1.6 - 2.6 mg/dL   CARDIAC PANEL,(CK, CKMB & TROPONIN)    Collection Time: 05/25/19  8:35 PM   Result Value Ref Range    CK 96 26 - 192 U/L    CK - MB 1.2 <3.6 ng/ml    CK-MB Index 1.3 0.0 - 4.0 %    Troponin-I, QT 0.05 (H) 0.0 - 0.045 NG/ML   POC LACTIC ACID    Collection Time: 05/25/19  8:38 PM   Result Value Ref Range    Lactic Acid (POC) 4.68 (HH) 0.40 - 2.00 mmol/L   EKG, 12 LEAD, INITIAL    Collection Time: 05/25/19  8:53 PM   Result Value Ref Range    Ventricular Rate 83 BPM    Atrial Rate 51 BPM    QRS Duration 92 ms    Q-T Interval 358 ms    QTC Calculation (Bezet) 420 ms    Calculated R Axis -45 degrees    Calculated T Axis 89 degrees    Diagnosis       Atrial fibrillation  Left axis deviation  Incomplete right bundle branch block  Anteroseptal infarct (cited on or before 12-APR-2019)  Abnormal ECG  When compared with ECG of 24-MAY-2019 11:08,  Questionable change in initial forces of Septal leads     URINALYSIS W/ RFLX MICROSCOPIC    Collection Time: 05/25/19  8:55 PM   Result Value Ref Range    Color DARK YELLOW      Appearance CLOUDY      Specific gravity 1.021 1.005 - 1.030      pH (UA) 5.0 5.0 - 8.0      Protein 30 (A) NEG mg/dL    Glucose NEGATIVE  NEG mg/dL    Ketone NEGATIVE  NEG mg/dL    Bilirubin SMALL (A) NEG      Blood NEGATIVE  NEG      Urobilinogen 2.0 (H) 0.2 - 1.0 EU/dL    Nitrites NEGATIVE  NEG      Leukocyte Esterase SMALL (A) NEG     URINE MICROSCOPIC ONLY    Collection Time: 05/25/19  8:55 PM   Result Value Ref Range    WBC 0 to 3 0 - 5 /hpf    RBC 0 to 3 0 - 5 /hpf    Epithelial cells 1+ 0 - 5 /lpf    Bacteria FEW (A) NEG /hpf       Radiologic Studies -   CT HEAD WO CONT   Final Result      No acute intracranial abnormalities.       XR CHEST PORT    (Results Pending)   RADIOLOGY FINDINGS  Chest x-ray shows cardiomegaly with mild vascular congestion  Pending review by Radiologist  Recorded by Yadi Root DO        CT Results  (Last 48 hours)               05/25/19 2201  CT HEAD WO CONT Final result    Impression:      No acute intracranial abnormalities. Narrative:  EXAM: CT head       INDICATION: Altered level of consciousness       COMPARISON: April 13, 2019       TECHNIQUE: Axial CT imaging of the head was performed without intravenous   contrast. One or more dose reduction techniques were used on this CT: automated   exposure control, adjustment of the mAs and/or kVp according to patient size,   and iterative reconstruction techniques. The specific techniques used on this   CT exam have been documented in the patient's electronic medical record. Digital   Imaging and Communications in Medicine (DICOM) format image data are available   to nonaffiliated external healthcare facilities or entities on a secure, media   free, reciprocally searchable basis with patient authorization for at least a   12-month period after this study. _______________       FINDINGS:       BRAIN AND POSTERIOR FOSSA: The sulci, folia, ventricles and basal cisterns are   within normal limits for the patient's age. There is no intracranial hemorrhage,   mass effect, or midline shift. There is an old left parietal lobe infarct with   encephalomalacia. EXTRA-AXIAL SPACES AND MENINGES: There are no abnormal extra-axial fluid   collections. CALVARIUM: Intact. SINUSES: Clear. OTHER: None.       _______________               CXR Results  (Last 48 hours)    None            Medical Decision Making   I am the first provider for this patient. I reviewed the vital signs, available nursing notes, past medical history, past surgical history, family history and social history. Vital Signs-Reviewed the patient's vital signs. Pulse Oximetry Analysis -100 % on room air    Cardiac Monitor:  Rate: 94 bpm  Rhythm: Regular    EKG interpretation: (Preliminary)  90 1 PM  A. fib 83 bpm.  QTc 420 ms.   No acute ST elevation    Records Reviewed: Nursing Notes and Old Medical Records    Provider Notes:   80 y.o. female with multiple comorbidities, DNR presenting with reported confusion. On exam patient is afebrile, normotensive and not tachycardic. She is oriented x3. She is noted to have +3 pedal edema bilaterally with dressings in place. Dressings soaked with foul-smelling discharge. Patient was history of chronic leg wounds, managed at wound care. Her right lower extremity shows erythema extending from her leg wounds to her upper thigh, streaking, warm to touch. Concern for worsening cellulitis from her leg wounds. Will initiate sepsis work-up. Patient also noted to have scleral icterus. No prior history of liver disease. Will check ammonia level. We will also obtain CT scan to eval for intracranial etiology although very low on my differential.    Procedures:  Procedures    ED Course:   8:31 PM   Initial assessment performed. The patients presenting problems have been discussed, and they are in agreement with the care plan formulated and outlined with them. I have encouraged them to ask questions as they arise throughout their visit. ED Course as of May 25 2302   Sat May 25, 2019   2039 Code sepsis initiated. ABx initiated, will hold IVF due to CHF. Discussed risks of IVF w/ patient, pt in agreement and defers IVF at this time. [CA]      ED Course User Index  [CA] Nikki Root DO     11:07 PM  Patient's labs significant for lactic acidosis at 4.68. Patient CBC significant for 30% bandemia. Infectious etiology likely secondary to her lower extremity wounds and cellulitis. Patient's troponin is slightly elevated at 0.05. Diagnosis and Disposition     11:06 PM  I have spent 40 minutes of critical care time involved in lab review, consultations with specialist, family decision-making, and documentation. During this entire length of time I was immediately available to the patient. Critical Care:   The reason for providing this level of medical care for this critically ill patient was due a critical illness that impaired one or more vital organ systems such that there was a high probability of imminent or life threatening deterioration in the patients condition. This care involved high complexity decision making to assess, manipulate, and support vital system functions, to treat this degreee vital organ system failure and to prevent further life threatening deterioration of the patients condition. Core Measures:  For Hospitalized Patients:    1. Hospitalization Decision Time:  The decision to hospitalize the patient was made by Tanner Scott DO at 11:07 PM on 5/25/2019    2. Aspirin: Aspirin was not given because the patient did not present with a stroke at the time of their Emergency Department evaluation    11:06 PM  Patient is being admitted to the hospital by Miryam Trujillo MD. The results of their tests and reasons for their admission have been discussed with them and/or available family. They convey agreement and understanding for the need to be admitted and for their admission diagnosis. CONDITIONS ON ADMISSION:  Sepsis is present at the time of admission. Deep Vein Thrombosis is not present at the time of admission. Urinary Tract Infection is not present at the time of admission. Pneumonia is not present at the time of admission. MRSA is not present at the time of admission. Wound infection is present at the time of admission. Pressure Ulcer is not present at the time of admission. CLINICAL IMPRESSION:    1. Sepsis, due to unspecified organism ([address]    2. Bandemia    3. Cellulitis of right lower extremity    4. Ulcers of both lower legs ([address]      ____________________________________     Please note that this dictation was completed with [employer], the [employer] voice recognition software.   Quite often unanticipated grammatical, syntax, homophones, and other interpretive errors are inadvertently transcribed by the computer software. Please disregard these errors. Please excuse any errors that have escaped final proofreading.

## 2019-05-27 ENCOUNTER — APPOINTMENT (OUTPATIENT)
Dept: VASCULAR SURGERY | Age: 84
DRG: 871 | End: 2019-05-27
Attending: HOSPITALIST
Payer: MEDICARE

## 2019-05-27 LAB
ANION GAP SERPL CALC-SCNC: 11 MMOL/L (ref 3–18)
BUN SERPL-MCNC: 52 MG/DL (ref 7–18)
BUN/CREAT SERPL: 39 (ref 12–20)
CALCIUM SERPL-MCNC: 8.1 MG/DL (ref 8.5–10.1)
CHLORIDE SERPL-SCNC: 107 MMOL/L (ref 100–108)
CO2 SERPL-SCNC: 24 MMOL/L (ref 21–32)
CREAT SERPL-MCNC: 1.32 MG/DL (ref 0.6–1.3)
ERYTHROCYTE [DISTWIDTH] IN BLOOD BY AUTOMATED COUNT: 18.5 % (ref 11.6–14.5)
GLUCOSE BLD STRIP.AUTO-MCNC: 128 MG/DL (ref 70–110)
GLUCOSE BLD STRIP.AUTO-MCNC: 156 MG/DL (ref 70–110)
GLUCOSE BLD STRIP.AUTO-MCNC: 162 MG/DL (ref 70–110)
GLUCOSE BLD STRIP.AUTO-MCNC: 163 MG/DL (ref 70–110)
GLUCOSE SERPL-MCNC: 158 MG/DL (ref 74–99)
HCT VFR BLD AUTO: 29.7 % (ref 35–45)
HGB BLD-MCNC: 9.9 G/DL (ref 12–16)
LACTATE SERPL-SCNC: 1.6 MMOL/L (ref 0.4–2)
LACTATE SERPL-SCNC: 1.9 MMOL/L (ref 0.4–2)
LACTATE SERPL-SCNC: 2.4 MMOL/L (ref 0.4–2)
MCH RBC QN AUTO: 29.1 PG (ref 24–34)
MCHC RBC AUTO-ENTMCNC: 33.3 G/DL (ref 31–37)
MCV RBC AUTO: 87.4 FL (ref 74–97)
PLATELET # BLD AUTO: 77 K/UL (ref 135–420)
PMV BLD AUTO: 11.8 FL (ref 9.2–11.8)
POTASSIUM SERPL-SCNC: 3.7 MMOL/L (ref 3.5–5.5)
RBC # BLD AUTO: 3.4 M/UL (ref 4.2–5.3)
SODIUM SERPL-SCNC: 142 MMOL/L (ref 136–145)
WBC # BLD AUTO: 12.5 K/UL (ref 4.6–13.2)

## 2019-05-27 PROCEDURE — 74011000258 HC RX REV CODE- 258: Performed by: HOSPITALIST

## 2019-05-27 PROCEDURE — 65660000000 HC RM CCU STEPDOWN

## 2019-05-27 PROCEDURE — 80048 BASIC METABOLIC PNL TOTAL CA: CPT

## 2019-05-27 PROCEDURE — 83605 ASSAY OF LACTIC ACID: CPT

## 2019-05-27 PROCEDURE — 74011250636 HC RX REV CODE- 250/636: Performed by: HOSPITALIST

## 2019-05-27 PROCEDURE — 93970 EXTREMITY STUDY: CPT

## 2019-05-27 PROCEDURE — 74011250636 HC RX REV CODE- 250/636: Performed by: EMERGENCY MEDICINE

## 2019-05-27 PROCEDURE — 82962 GLUCOSE BLOOD TEST: CPT

## 2019-05-27 PROCEDURE — 74011250637 HC RX REV CODE- 250/637: Performed by: HOSPITALIST

## 2019-05-27 PROCEDURE — 74011636637 HC RX REV CODE- 636/637: Performed by: HOSPITALIST

## 2019-05-27 PROCEDURE — 85027 COMPLETE CBC AUTOMATED: CPT

## 2019-05-27 PROCEDURE — 36415 COLL VENOUS BLD VENIPUNCTURE: CPT

## 2019-05-27 RX ADMIN — CARVEDILOL 12.5 MG: 12.5 TABLET, FILM COATED ORAL at 21:40

## 2019-05-27 RX ADMIN — PIPERACILLIN SODIUM,TAZOBACTAM SODIUM 3.38 G: 3; .375 INJECTION, POWDER, FOR SOLUTION INTRAVENOUS at 15:08

## 2019-05-27 RX ADMIN — INSULIN LISPRO 2 UNITS: 100 INJECTION, SOLUTION INTRAVENOUS; SUBCUTANEOUS at 17:10

## 2019-05-27 RX ADMIN — INSULIN LISPRO 2 UNITS: 100 INJECTION, SOLUTION INTRAVENOUS; SUBCUTANEOUS at 12:15

## 2019-05-27 RX ADMIN — PIPERACILLIN SODIUM,TAZOBACTAM SODIUM 3.38 G: 3; .375 INJECTION, POWDER, FOR SOLUTION INTRAVENOUS at 08:41

## 2019-05-27 RX ADMIN — BUMETANIDE 0.5 MG: 1 TABLET ORAL at 08:41

## 2019-05-27 RX ADMIN — PIPERACILLIN SODIUM,TAZOBACTAM SODIUM 3.38 G: 3; .375 INJECTION, POWDER, FOR SOLUTION INTRAVENOUS at 21:40

## 2019-05-27 RX ADMIN — PIPERACILLIN SODIUM,TAZOBACTAM SODIUM 3.38 G: 3; .375 INJECTION, POWDER, FOR SOLUTION INTRAVENOUS at 02:54

## 2019-05-27 RX ADMIN — VANCOMYCIN HYDROCHLORIDE 1500 MG: 10 INJECTION, POWDER, LYOPHILIZED, FOR SOLUTION INTRAVENOUS at 10:21

## 2019-05-27 RX ADMIN — ENOXAPARIN SODIUM 30 MG: 30 INJECTION SUBCUTANEOUS at 02:54

## 2019-05-27 RX ADMIN — CARVEDILOL 12.5 MG: 12.5 TABLET, FILM COATED ORAL at 08:41

## 2019-05-27 RX ADMIN — INSULIN LISPRO 2 UNITS: 100 INJECTION, SOLUTION INTRAVENOUS; SUBCUTANEOUS at 23:18

## 2019-05-27 NOTE — PROGRESS NOTES
Hospitalist Progress Note    Patient: Howard Dawkins MRN: [id number]  CSN: [id number]    YOB: 1926  Age: 80 y.o. Sex: female    DOA: 5/25/2019 LOS:  LOS: 2 days                Assessment/Plan     Patient Active Problem List   Diagnosis Code    Ulcer of right pretibial region, with fat layer exposed ([facility] L97.812    CHF (congestive heart failure) ([facility]) I50.9    Chronic atrial fibrillation ([facility]) I48.2    Diabetes mellitus type 2, diet-controlled ([facility] K21.0    Diastolic CHF, acute on chronic ([facility]) I50.33    History of stroke Z86.73    Cellulitis L03.90    Bandemia D72.825    Sepsis ([facility] A41.9    Bacteremia R78.81    Thrombocytopenia ([facility] D69.6            81 yo female admitted for AMS, cellulitis, sepsis. Daughter reports patient still confused. Sepsis - secondary to bacteremia, cellulitis, improving. Cellulitis -     Bacteremia - 2/2 blood cultures from 05/25/2019 growing GNR  Follow blood cultures from 05/26/2019    Antibiotics - zosyn, vancomycin. Cardiac enzymes flat    Chronic diastolic CHF - compensated. Chronic A-fib - rate controlled on coreg. Not on anticoagulation. DM - continue on SSI. Thrombocytopenia - monitor platelets. DVT prophylaxis. Discussed with daughter at bedside        Disposition : 3-5 days    Review of systems  General: No fevers or chills. Cardiovascular: No chest pain or pressure. No palpitations. Pulmonary: No shortness of breath. Gastrointestinal: No nausea, vomiting. Physical Exam:  General: Awake, cooperative, no acute distress    HEENT: NC, Atraumatic. PERRLA, anicteric sclerae. Lungs: CTA Bilaterally. No Wheezing/Rhonchi/Rales. Heart:  S1 S2,  No murmur, No Rubs, No Gallops  Abdomen: Soft, Non distended, Non tender.  +Bowel sounds,   Extremities: Bilateral legs with ace wrap  Psych:   Not anxious or agitated. Neurologic:  No acute neurological deficit.            Vital signs/Intake and Output:  Visit Vitals  /65 (BP 1 Location: Left arm, BP Patient Position: At rest)   Pulse 98   Temp 97.6 °F (36.4 °C)   Resp 18   Ht 5' 6\" (1.676 m)   Wt 83.3 kg (183 lb 10.3 oz)   SpO2 100%   Breastfeeding? No   BMI 29.64 kg/m²     Current Shift:  No intake/output data recorded. Last three shifts:  05/25 1901 - 05/27 0700  In: 700 [I.V.:700]  Out: -             Labs: Results:       Chemistry Recent Labs     05/27/19  0131 05/26/19  0159 05/25/19 2035   * 115* 202*    144 145   K 3.7 3.5 3.7    109* 107   CO2 24 24 23   BUN 52* 43* 43*   CREA 1.32* 1.14 1.27   CA 8.1* 8.2* 8.5   AGAP 11 11 15   BUCR 39* 38* 34*   AP  --   --  63   TP  --   --  5.2*   ALB  --   --  2.1*   GLOB  --   --  3.1   AGRAT  --   --  0.7*      CBC w/Diff Recent Labs     05/27/19 0131 05/26/19 0159 05/25/19 2035   WBC 12.5 3.8* 4.5*   RBC 3.40* 3.35* 3.63*   HGB 9.9* 9.5* 10.4*   HCT 29.7* 29.7* 32.2*   PLT 77* 90* 102*   GRANS  --   --  63   LYMPH  --   --  4*   EOS  --   --  0      Cardiac Enzymes Recent Labs     05/26/19  1731 05/26/19  1420    103   CKND1 2.1 1.9      Coagulation No results for input(s): PTP, INR, APTT in the last 72 hours. No lab exists for component: INREXT    Lipid Panel Lab Results   Component Value Date/Time    Cholesterol, total 140 04/06/2011 03:50 AM    HDL Cholesterol 64 (H) 04/06/2011 03:50 AM    LDL, calculated 70.4 04/06/2011 03:50 AM    VLDL, calculated 5.6 04/06/2011 03:50 AM    Triglyceride 28 04/06/2011 03:50 AM    CHOL/HDL Ratio 2.2 04/06/2011 03:50 AM      BNP No results for input(s): BNPP in the last 72 hours.    Liver Enzymes Recent Labs     05/25/19 2035   TP 5.2*   ALB 2.1*   AP 63   SGOT 21      Thyroid Studies Lab Results   Component Value Date/Time    TSH 0.78 04/06/2011 03:50 AM        Procedures/imaging: see electronic medical records for all procedures/Xrays and details which were not copied into this note but were reviewed prior to creation of Plan [facility]

## 2019-05-27 NOTE — PROGRESS NOTES
INITIAL NUTRITION ASSESSMENT     RECOMMENDATIONS/PLAN:   Will order Glucerna BID to aid in meeting estimated needs  Monitor appetite  Monitor labs/lytes, skin integrity, bowel function, weight/fluid status  Will continue to follow and adjust reccs as needed    REASON FOR ASSESSMENT:     []  RN Referral:    [] MST score >/=2  Malnutrition Screening Tool (MST):  Recently Lost Weight Without Trying: Unsure     Eating Poorly Due to Decreased Appetite: Yes  MST Score: 3    [] Enteral/Parenteral Nutrition PTA   [] Pregnant (Not in Labor):  [] Gestational DM     [] Multigestation   [] Pressure Ulcer/Wound Care needs    NUTRITION ASSESSMENT:   Client History: 80 yrs old Female admitted with c/o AMS, found to have cellulitis of right leg per MD note     PMHx: a fib, chronic diastolic heart failure, chronic pain, breast cancer, DM, HTN  Cultural/Taoism Food Preferences: None Identified    FOOD/NUTRITION HISTORY  Diet History: unable to obtain at this time  Food Allergies:  [x] NKFA       Pertinent PTA Medications: bumex, lasix    NUTRITION INTAKE   Diet Order:  Diabetic diet 2000kcal  Average PO Intake:       No data found.    Pertinent Medications:  [x] Reviewed; bumex, lispro  Electrolyte Replacement Protocol: []K  []Mg  []PO4    Insulin:  [] SSI  [x] Pre-meal   []  Basal   [] Drip  [] None  Pt expected to meet estimated nutrient needs through next review:          [x]  Yes      ANTHROPOMETRICS  Height: 5' 6\" (167.6 cm)       Weight: 83.3 kg (183 lb 10.3 oz)    BMI: 29.7 kg/m^2  -  overweight (25.0%-29.9% BMI)        Weight change: per chart reviewed wt 4/2019 was 81.1kg, no wt loss noted at this time                                   Comparison to Reference Standards:  IBW: 130 lbs      %IBW: 140%      AdjBW: 66.5 kg    NUTRITION-FOCUSED PHYSICAL ASSESSMENT  Skin: cellulitis to R leg, no pressure areas per documentation  GI: BM 5/27    BIOCHEMICAL DATA & MEDICAL TESTS  Pertinent Labs:  [x] Reviewed    NUTRITION PRESCRIPTION  Calories: 1662 kcal/day based on 25kcal/kg adj body weight  Protein: 80 g/day based on 1.2 g/kg Adj Body weight  CHO: 208 g/day based on 50% of total energy  Fluid: 1662 ml/day based on 1 kcal/ml      NUTRITION DIAGNOSES:   1. At risk for unintended weight loss related to reduced appetite as evidenced by pt report in MST    NUTRITION INTERVENTIONS:   INTERVENTIONS:        GOALS:  1. Commercial beverage: glucerna BID 1. Glucerna intakes >75% throughout the next 3-4 days     LEARNING NEEDS (Diet, Supplementation, Food/Nutrient-Drug Interaction):   [x] None Identified  [] Inpatient education provided/documented    [] Identified and patient:  [] Declined     [] Was not appropriate/indicated  NUTRITION MONITORING /EVALUATION:   Follow PO intake  Monitor wt  Monitor renal labs, electrolytes, fluid status  Monitor for additional supplement needs    [] Participated in Interdisciplinary Rounds  [x] [address] [place] [address] Reviewed/Documented  DISCHARGE NUTRITION RECOMMENDATIONS ADDRESSED:        [x] To be determined closer to discharge    NUTRITION RISK:     [x]  At risk                     []  Not currently at risk     Will follow-up per policy.   Katherine Vasquez 1

## 2019-05-27 NOTE — PROGRESS NOTES
Problem: Falls - Risk of  Goal: *Absence of Falls  Description  Document Ricardo Hicks Fall Risk and appropriate interventions in the flowsheet. Outcome: Progressing Towards Goal     Problem: Patient Education: Go to Patient Education Activity  Goal: Patient/Family Education  Outcome: Progressing Towards Goal     Problem: Pressure Injury - Risk of  Goal: *Prevention of pressure injury  Description  Document Ignacio Scale and appropriate interventions in the flowsheet.   Outcome: Progressing Towards Goal     Problem: Patient Education: Go to Patient Education Activity  Goal: Patient/Family Education  Outcome: Progressing Towards Goal

## 2019-05-27 NOTE — PROGRESS NOTES
1909 Assumed care of the patient from [address] [place] [address] (offgoing Nurse). Patient is alert and oriented x2. Pt denies any pain or discomfort at this moment. bed in low position, call bell within reach. 1430 Pt wheezing. Spoke with Dr. Brittney Grimaldo. Stated he will look at it when he comes upstairs. 1903 Bedside and Verbal shift change report given to Cindra Boas RN (oncoming nurse) by Renae Rodrigues RN (offgoing nurse). Report included the following information SBAR, Kardex, MAR, Recent Results and Cardiac Rhythm .

## 2019-05-27 NOTE — PROGRESS NOTES
Bedside and Verbal shift change report given to MICHAEL Dillon RN (oncoming nurse) by BARBARA Martino RN (offgoing nurse). Report included the following information SBAR, Kardex, Intake/Output, MAR and Recent Results.

## 2019-05-28 ENCOUNTER — APPOINTMENT (OUTPATIENT)
Dept: NON INVASIVE DIAGNOSTICS | Age: 84
DRG: 871 | End: 2019-05-28
Attending: HOSPITALIST
Payer: MEDICARE

## 2019-05-28 PROBLEM — D69.59 THROMBOCYTOPENIA, SECONDARY: Status: ACTIVE | Noted: 2019-05-28

## 2019-05-28 LAB
ANION GAP SERPL CALC-SCNC: 11 MMOL/L (ref 3–18)
BACTERIA SPEC CULT: ABNORMAL
BUN SERPL-MCNC: 57 MG/DL (ref 7–18)
BUN/CREAT SERPL: 50 (ref 12–20)
CALCIUM SERPL-MCNC: 8.2 MG/DL (ref 8.5–10.1)
CHLORIDE SERPL-SCNC: 111 MMOL/L (ref 100–108)
CO2 SERPL-SCNC: 24 MMOL/L (ref 21–32)
CREAT SERPL-MCNC: 1.14 MG/DL (ref 0.6–1.3)
ECHO AO ASC DIAM: 3.2 CM
ECHO AV AREA PEAK VELOCITY: 1.5 CM2
ECHO AV AREA VTI: 1.8 CM2
ECHO AV AREA/BSA PEAK VELOCITY: 0.8 CM2/M2
ECHO AV AREA/BSA VTI: 0.9 CM2/M2
ECHO AV MEAN GRADIENT: 3.5 MMHG
ECHO AV PEAK GRADIENT: 7 MMHG
ECHO AV PEAK VELOCITY: 132.64 CM/S
ECHO AV VTI: 21.01 CM
ECHO IVC PROX: 2.16 CM
ECHO LA MAJOR AXIS: 4.81 CM
ECHO LA VOL 2C: 68.1 ML (ref 22–52)
ECHO LA VOL 4C: 64.57 ML (ref 22–52)
ECHO LA VOLUME INDEX A2C: 35.36 ML/M2 (ref 16–28)
ECHO LA VOLUME INDEX A4C: 33.52 ML/M2 (ref 16–28)
ECHO LV E' LATERAL VELOCITY: 14 CM/S
ECHO LV E' SEPTAL VELOCITY: 7 CM/S
ECHO LV EDV A2C: 39.1 ML
ECHO LV EDV A4C: 45.5 ML
ECHO LV EDV BP: 43.9 ML (ref 56–104)
ECHO LV EDV INDEX A4C: 23.6 ML/M2
ECHO LV EDV INDEX BP: 22.8 ML/M2
ECHO LV EDV NDEX A2C: 20.3 ML/M2
ECHO LV EJECTION FRACTION A2C: 32 %
ECHO LV EJECTION FRACTION A4C: 34 %
ECHO LV EJECTION FRACTION BIPLANE: 35.1 % (ref 55–100)
ECHO LV ESV A2C: 26.7 ML
ECHO LV ESV A4C: 30.1 ML
ECHO LV ESV BP: 28.5 ML (ref 19–49)
ECHO LV ESV INDEX A2C: 13.9 ML/M2
ECHO LV ESV INDEX A4C: 15.6 ML/M2
ECHO LV ESV INDEX BP: 14.8 ML/M2
ECHO LV INTERNAL DIMENSION DIASTOLIC: 4.78 CM (ref 3.9–5.3)
ECHO LV INTERNAL DIMENSION SYSTOLIC: 3.67 CM
ECHO LV IVSD: 1.29 CM (ref 0.6–0.9)
ECHO LV MASS 2D: 280.9 G (ref 67–162)
ECHO LV MASS INDEX 2D: 145.8 G/M2 (ref 43–95)
ECHO LV POSTERIOR WALL DIASTOLIC: 1.25 CM (ref 0.6–0.9)
ECHO LVOT DIAM: 2.01 CM
ECHO LVOT PEAK GRADIENT: 1.6 MMHG
ECHO LVOT PEAK VELOCITY: 62.45 CM/S
ECHO LVOT VTI: 11.92 CM
ECHO MV A VELOCITY: 11.28 CM/S
ECHO MV AREA PHT: 4.3 CM2
ECHO MV E DECELERATION TIME (DT): 177.4 MS
ECHO MV E VELOCITY: 109.96 CM/S
ECHO MV E/A RATIO: 9.75
ECHO MV E/E' LATERAL: 7.85
ECHO MV E/E' RATIO (AVERAGED): 11.78
ECHO MV E/E' SEPTAL: 15.71
ECHO MV PRESSURE HALF TIME (PHT): 51.4 MS
ECHO PULMONARY ARTERY SYSTOLIC PRESSURE (PASP): 50 MMHG
ECHO RV INTERNAL DIMENSION: 4.76 CM
ECHO TRICUSPID ANNULAR PEAK SYSTOLIC VELOCITY: 0.7 CM/S
ECHO TV REGURGITANT MAX VELOCITY: 307.52 CM/S
ECHO TV REGURGITANT PEAK GRADIENT: 37.8 MMHG
ERYTHROCYTE [DISTWIDTH] IN BLOOD BY AUTOMATED COUNT: 18.4 % (ref 11.6–14.5)
GLUCOSE BLD STRIP.AUTO-MCNC: 127 MG/DL (ref 70–110)
GLUCOSE BLD STRIP.AUTO-MCNC: 135 MG/DL (ref 70–110)
GLUCOSE BLD STRIP.AUTO-MCNC: 158 MG/DL (ref 70–110)
GLUCOSE BLD STRIP.AUTO-MCNC: 171 MG/DL (ref 70–110)
GLUCOSE SERPL-MCNC: 131 MG/DL (ref 74–99)
GRAM STN SPEC: ABNORMAL
HCT VFR BLD AUTO: 30.5 % (ref 35–45)
HGB BLD-MCNC: 10.2 G/DL (ref 12–16)
LACTATE SERPL-SCNC: 1.5 MMOL/L (ref 0.4–2)
MCH RBC QN AUTO: 28.7 PG (ref 24–34)
MCHC RBC AUTO-ENTMCNC: 33.4 G/DL (ref 31–37)
MCV RBC AUTO: 85.9 FL (ref 74–97)
PLATELET # BLD AUTO: 61 K/UL (ref 135–420)
POTASSIUM SERPL-SCNC: 3.6 MMOL/L (ref 3.5–5.5)
RBC # BLD AUTO: 3.55 M/UL (ref 4.2–5.3)
SERVICE CMNT-IMP: ABNORMAL
SERVICE CMNT-IMP: ABNORMAL
SODIUM SERPL-SCNC: 146 MMOL/L (ref 136–145)
WBC # BLD AUTO: 13.4 K/UL (ref 4.6–13.2)

## 2019-05-28 PROCEDURE — 77030037878 HC DRSG MEPILEX >48IN BORD MOLN -B

## 2019-05-28 PROCEDURE — 74011250636 HC RX REV CODE- 250/636: Performed by: HOSPITALIST

## 2019-05-28 PROCEDURE — 97530 THERAPEUTIC ACTIVITIES: CPT

## 2019-05-28 PROCEDURE — 74011250637 HC RX REV CODE- 250/637: Performed by: HOSPITALIST

## 2019-05-28 PROCEDURE — 74011636637 HC RX REV CODE- 636/637: Performed by: HOSPITALIST

## 2019-05-28 PROCEDURE — 85027 COMPLETE CBC AUTOMATED: CPT

## 2019-05-28 PROCEDURE — 93308 TTE F-UP OR LMTD: CPT

## 2019-05-28 PROCEDURE — 74011000258 HC RX REV CODE- 258: Performed by: HOSPITALIST

## 2019-05-28 PROCEDURE — 82962 GLUCOSE BLOOD TEST: CPT

## 2019-05-28 PROCEDURE — 83605 ASSAY OF LACTIC ACID: CPT

## 2019-05-28 PROCEDURE — 97166 OT EVAL MOD COMPLEX 45 MIN: CPT

## 2019-05-28 PROCEDURE — 65660000000 HC RM CCU STEPDOWN

## 2019-05-28 PROCEDURE — 80048 BASIC METABOLIC PNL TOTAL CA: CPT

## 2019-05-28 PROCEDURE — 87040 BLOOD CULTURE FOR BACTERIA: CPT

## 2019-05-28 PROCEDURE — 36415 COLL VENOUS BLD VENIPUNCTURE: CPT

## 2019-05-28 RX ADMIN — PIPERACILLIN SODIUM,TAZOBACTAM SODIUM 3.38 G: 3; .375 INJECTION, POWDER, FOR SOLUTION INTRAVENOUS at 15:15

## 2019-05-28 RX ADMIN — PIPERACILLIN SODIUM,TAZOBACTAM SODIUM 3.38 G: 3; .375 INJECTION, POWDER, FOR SOLUTION INTRAVENOUS at 08:28

## 2019-05-28 RX ADMIN — CARVEDILOL 12.5 MG: 12.5 TABLET, FILM COATED ORAL at 08:28

## 2019-05-28 RX ADMIN — PIPERACILLIN SODIUM,TAZOBACTAM SODIUM 3.38 G: 3; .375 INJECTION, POWDER, FOR SOLUTION INTRAVENOUS at 02:04

## 2019-05-28 RX ADMIN — BUMETANIDE 0.5 MG: 1 TABLET ORAL at 08:28

## 2019-05-28 RX ADMIN — PIPERACILLIN SODIUM,TAZOBACTAM SODIUM 3.38 G: 3; .375 INJECTION, POWDER, FOR SOLUTION INTRAVENOUS at 21:55

## 2019-05-28 RX ADMIN — INSULIN LISPRO 2 UNITS: 100 INJECTION, SOLUTION INTRAVENOUS; SUBCUTANEOUS at 21:56

## 2019-05-28 RX ADMIN — CARVEDILOL 12.5 MG: 12.5 TABLET, FILM COATED ORAL at 21:55

## 2019-05-28 NOTE — PROGRESS NOTES
Problem: Falls - Risk of  Goal: *Absence of Falls  Description  Document Malick Mcdonnell Fall Risk and appropriate interventions in the flowsheet.   Outcome: Progressing Towards Goal

## 2019-05-28 NOTE — PROGRESS NOTES
7019 Assumed care of the patient from [address] [facility] (offgoing Nurse). Patient is alert and oriented. Pt denies any pain or discomfort at this moment. bed in low position, call bell within reach. Bedside and Verbal shift change report given to Carri Jhaveri RN (oncoming nurse) by Vladimir Monterroso RN (offgoing nurse). Report included the following information SBAR, Kardex, MAR, Recent Results and Cardiac Rhythm .

## 2019-05-28 NOTE — PROGRESS NOTES
Transition of care: SNF when medically called  Met with patient at bedside. States she is thirsty and hungry. Patients nurse Roxanne Schmid notified. Patient is alert to person. Cm telephoned call to her daughter Yoandy Ayala. She was informed that [facility] has accepted she said she would like to see if [facility] would accept since they were her first choice. Telephone call to [address] at [facility]. States she will need PT and OT notes. Patient has Pt order. Received verbal order from Dr. Rosario Car for [address] [facility]. Cm will continue to follow. Care Management Interventions  PCP Verified by CM:  Yes  Mode of Transport at Discharge: BLS  Transition of Care Consult (CM Consult): SNF  Partner SNF: Yes  Physical Therapy Consult: Yes  Occupational Therapy Consult: Yes  Current Support Network: Assisted Living  Confirm Follow Up Transport: Family  Plan discussed with Pt/Family/Caregiver: Yes  Freedom of Choice Offered: Yes  Discharge Location  Discharge Placement: Skilled nursing facility

## 2019-05-28 NOTE — PROGRESS NOTES
00:30 Assessment completed. See nsg flow sheet for details. 02:55 Reassessed with 0 changes noted. Resting quietly in bed with eyes closed between cares. 07:00 Bedside and Verbal shift change report given to A Sah RN (oncoming nurse) by Vin Gilman RN (offgoing nurse). Report included the following information SBAR.

## 2019-05-28 NOTE — PROGRESS NOTES
1920: Assumed patient care from [address] [place] [address] Rd., [address]. Patient is alert and oriented to person and time, but disoriented to place and situation. Respiratory status is stable on room air. Vital signs are stable. MEWS score is a one. Patient denies any pain, discomfort, nausea vomiting dizziness or anxiety. White board and fall card is updated. Bed is locked and in lowest position. Call bell, water and personal belongings are within reach. Patient has no questions, comments or concerns after bedside shift report. 2330: Patient had an uneventful shift. Respiratory status, vital signs and MEWS score remained stable. Patient was resting quietly with no signs of distress noted. Bed locked and in lowest position. Call bell water and personal belongings were within reach. Patient had no questions, comments or concerns after bedside shift report.  Bedside report given to Ember Ward R.N.

## 2019-05-28 NOTE — PROGRESS NOTES
Hospitalist Progress Note    Patient: Radha Urena MRN: [id number]  CSN: [id number]    YOB: 1926  Age: 80 y.o. Sex: female    DOA: 5/25/2019 LOS:  LOS: 3 days          Chief Complaint:    sepsis      Assessment/Plan     Sepsis-resolved, lactate normalized    81 yo female admitted for AMS, cellulitis, sepsis.      Sepsis - secondary to bacteremia, cellulitis, improving. Metabolic encephalopathy-certainly may be due to severe infection still getting better     Cellulitis - right leg-much improved    Elevated bili and dilated CBD on CT scan-get US tomorrow and check duct, repeat bili also     Bacteremia -repeat cultures-on 5/26 one of two still positive-morganella and pseudomonas positve on admission in blood  Check echo also for vegetation-all sensitive to zosyn     Chronic diastolic CHF - compensated. Continue bumex     Chronic A-fib - rate controlled on coreg. Not on anticoagulation.      DM - continue on SSI.  A1C less than 7%     Thrombocytopenia - worsened-stop lovenox, may be due to sepsis, continue to monitor daily CBC and hold any blood thinners-may be acute on chronic as plt count low in 80K range as far back as 2011    DNR, can d/c telemetry as no arrhythmias noted  Will need ID consult once final cx results in    Disposition :SNF 2-3 days  Patient Active Problem List   Diagnosis Code    Ulcer of right pretibial region, with fat layer exposed ([facility] [facility] L97.812    CHF (congestive heart failure) ([facility] I50.9    Chronic atrial fibrillation ([facility] I48.2    Diabetes mellitus type 2, diet-controlled ([address] [facility] L27.1    Diastolic CHF, acute on chronic ([facility]) I50.33    History of stroke Z86.73    Cellulitis L03.90    Bandemia D72.825    Sepsis ([address] [facility] A41.9    Bacteremia R78.81    Thrombocytopenia ([address] [facility] D69.6       Subjective:    Feels ok  Falls asleep easily  Ate some lunch  Denies pain, SOB, nausea    Review of systems:    Constitutional: denies fevers, chills  Respiratory: denies SOB, cough  Cardiovascular: denies chest pain, palpitations  Gastrointestinal: denies  diarrhea      Vital signs/Intake and Output:  Visit Vitals  /71   Pulse (!) 103   Temp 97.3 °F (36.3 °C)   Resp 20   Ht 5' 6\" (1.676 m)   Wt 83.1 kg (183 lb 4.8 oz)   SpO2 100%   Breastfeeding? No   BMI 29.59 kg/m²     Current Shift:  No intake/output data recorded. Last three shifts:  05/26 1901 - 05/28 0700  In: 440 [P.O.:240; I.V.:200]  Out: 0     Exam:    General: elderly frail appearing BF, NAD, oriented to person  Head/Neck: NCAT, supple, No masses, No lymphadenopathy  CVS:irreg rhythm, no M/R/G, S1/S2 heard, no thrill  Lungs:Clear to auscultation bilaterally, no wheezes, rhonchi, or rales  Abdomen: Soft, Nontender, No distention, Normal Bowel sounds, No hepatomegaly  Extremities: chronic stasis edema changes LE  Skin:redness of right thigh resolved, LE wrapped  Neuro:grossly normal , follows commands  Psych:appropriate                Labs: Results:       Chemistry Recent Labs     05/28/19 0344 05/27/19 0131 05/26/19 0159 05/25/19 2035   * 158* 115* 202*   * 142 144 145   K 3.6 3.7 3.5 3.7   * 107 109* 107   CO2 24 24 24 23   BUN 57* 52* 43* 43*   CREA 1.14 1.32* 1.14 1.27   CA 8.2* 8.1* 8.2* 8.5   AGAP 11 11 11 15   BUCR 50* 39* 38* 34*   AP  --   --   --  63   TP  --   --   --  5.2*   ALB  --   --   --  2.1*   GLOB  --   --   --  3.1   AGRAT  --   --   --  0.7*      CBC w/Diff Recent Labs     05/28/19 0344 05/27/19 0131 05/26/19 0159 05/25/19 2035   WBC 13.4* 12.5 3.8* 4.5*   RBC 3.55* 3.40* 3.35* 3.63*   HGB 10.2* 9.9* 9.5* 10.4*   HCT 30.5* 29.7* 29.7* 32.2*   PLT 61* 77* 90* 102*   GRANS  --   --   --  63   LYMPH  --   --   --  4*   EOS  --   --   --  0      Cardiac Enzymes Recent Labs     05/26/19  1731 05/26/19  1420    103   CKND1 2.1 1.9      Coagulation No results for input(s): PTP, INR, APTT in the last 72 hours.     No lab exists for component: INREXT    Lipid Panel Lab Results Component Value Date/Time    Cholesterol, total 140 04/06/2011 03:50 AM    HDL Cholesterol 64 (H) 04/06/2011 03:50 AM    LDL, calculated 70.4 04/06/2011 03:50 AM    VLDL, calculated 5.6 04/06/2011 03:50 AM    Triglyceride 28 04/06/2011 03:50 AM    CHOL/HDL Ratio 2.2 04/06/2011 03:50 AM      BNP No results for input(s): BNPP in the last 72 hours.    Liver Enzymes Recent Labs     05/25/19 2035   TP 5.2*   ALB 2.1*   AP 63   SGOT 21      Thyroid Studies Lab Results   Component Value Date/Time    TSH 0.78 04/06/2011 03:50 AM        Procedures/imaging: see electronic medical records for all procedures/Xrays and details which were not copied into this note but were reviewed prior to creation of Demarcus Recinos MD

## 2019-05-28 NOTE — PROGRESS NOTES
Problem: Self Care Deficits Care Plan (Adult)  Goal: *Acute Goals and Plan of Care (Insert Text)  Description  Occupational Therapy Goals  Initiated 5/28/2019 within 7 day(s). 1.  Patient will perform grooming with minimal assistance/contact guard assist   2. Patient will perform self-feeding with minimal assistance/contact guard assist.  3.  Patient will perform UB bathing with supervision/set-up. 4.  Patient will complete supine to sit EOB with min assist in prep for ADLs. 5.  Patient will participate in upper extremity therapeutic exercise/activities with supervision/set-up for 2 minutes. Outcome: Progressing Towards Goal     OCCUPATIONAL THERAPY EVALUATION    Patient: Maritza Galloway (17 y.o. female)  Date: 5/28/2019  Primary Diagnosis: Sepsis ([facility] [A41.9]  Bandemia [D72.825]  Cellulitis [L03.90]        Precautions: Fall, WBAT    ASSESSMENT :  Based on the objective data described below, the patient presents with sepsis and noted cellulitis in B LE. Pt very limited this session secondary to pain and confusion/agitation. Pt right UE no active movement noted this session secondary to pain. B LE pt not actively moving. Initially pt reported wanted to get OOB but then unable to initiate any movement and becoming agitated when therapist attempted to move LE or right UE secondary to pain. Pt oriented to person and place only. Family present in room and reported pt has been getting progressively weaker and now confused. Normally pleasant and independent at baseline. Pt max/total assist for all ADLs at this time and refusing further movement. Pt could benefit from OT to increase I with ADLs, transfers, mobility, activity tolerance and strength for functional activity. Pt may also benefit from Palliative Consult. Patient will benefit from skilled intervention to address the above impairments.   Patient?s rehabilitation potential is considered to be Fair  Factors which may influence rehabilitation potential include:   ? None noted  ? Mental ability/status  ? Medical condition  ? Home/family situation and support systems  ? Safety awareness  ? Pain tolerance/management  ? Other:      PLAN :  Recommendations and Planned Interventions:  ?               Self Care Training                  ? Therapeutic Activities  ? Functional Mobility Training    ? Cognitive Retraining  ? Therapeutic Exercises           ? Endurance Activities  ? Balance Training                   ? Neuromuscular Re-Education  ? Visual/Perceptual Training     ? Home Safety Training  ? Patient Education                 ? Family Training/Education  ? Other (comment):    Frequency/Duration: Patient will be followed by occupational therapy 1-2 times per day/4-7 days per week to address goals. Discharge Recommendations: Rehab  Further Equipment Recommendations for Discharge: N/A     SUBJECTIVE:   Patient stated ? I don't understand what you're asking it just hurts. ?    OBJECTIVE DATA SUMMARY:     Past Medical History:   Diagnosis Date    A-fib ([facility]     Breast cancer ([facility] 2014    Chronic pain     Diabetes ([facility]     Hypertension     Menopause      Past Surgical History:   Procedure Laterality Date    HX BREAST BIOPSY      Left br.   Benign    HX HYSTERECTOMY      Age 52     Barriers to Learning/Limitations: yes;  cognitive and physical  Compensate with: visual, verbal, tactile, kinesthetic cues/model  Prior Level of Function/Home Situation: I with ADLs prior to admission using RW per family and pt report, Pt was in rehab for 2 weeks in April but has been declining since with B LE cellulitis affecting mobility  Home Situation  Home Environment: Independent living([facility])  One/Two Story Residence: One story  Living Alone: Yes  Patient Expects to be Discharged to[de-identified] Unknown  Current DME Used/Available at Home: Walker, rolling  Tub or Shower Type: Shower  ? Right hand dominant   ? Left hand dominant  Cognitive/Behavioral Status:  Neurologic State: Alert;Confused  Orientation Level: Oriented to person;Oriented to place  Cognition: Follows commands  Safety/Judgement: Decreased awareness of environment;Decreased awareness of need for assistance;Decreased awareness of need for safety;Decreased insight into deficits  Skin: B LE wrapped in ace wrap  Edema: min edema noted in B LE and right UE  Vision/Perceptual:    N/A  Coordination:  Coordination: Grossly decreased, non-functional B UE  Fine Motor Skills-Upper: Left Impaired;Right Impaired    Gross Motor Skills-Upper: Left Impaired;Right Impaired  Balance:  N/A secondary to pt agitation and pain with any type of movement  Strength:  Strength: Grossly decreased, non-functional  Right UE not tested secondary to pain, elevated on pillow  Left UE elbow flexion and  strength 3-/5  Tone & Sensation:  Tone: Abnormal  Range of Motion:  AROM: Grossly decreased, non-functional  Right UE PROM only secondary to pain  Left UE no active shld flexion, elbow flexion/extension and finger flexion/extension WFL  Functional Mobility and Transfers for ADLs:  Bed Mobility:  Rolling: Maximum assistance; Total assistance  Attempted to roll pt in bed and pt resisted secondary to pain and did not assist, unable to complete full rolling in bed  Transfers:  N/A  ADL Assessment:  Feeding: Maximum assistance  Oral Facial Hygiene/Grooming: Total assistance  Upper Body Dressing: Total assistance  Lower Body Dressing:  Total assistance  ADL Intervention:  Cognitive Retraining  Safety/Judgement: Decreased awareness of environment;Decreased awareness of need for assistance;Decreased awareness of need for safety;Decreased insight into deficits    Pain:  Pain Scale 1: Numeric (0 - 10)  Pain Intensity 1: 0    Activity Tolerance:   Poor  Please refer to the flowsheet for vital signs taken during this treatment. After treatment:   ? Patient left in no apparent distress sitting up in chair  ? Patient left in no apparent distress in bed  ? Call bell left within reach  ? Nursing notified  ? Caregiver present  ? Bed alarm activated    COMMUNICATION/EDUCATION:   ? Home safety education was provided and the patient/caregiver indicated understanding. ? Patient/family have participated as able in goal setting and plan of care. ? Patient/family agree to work toward stated goals and plan of care. ? Patient understands intent and goals of therapy, but is neutral about his/her participation. ? Patient is unable to participate in goal setting and plan of care.     Thank you for this referral.  Gerardo Torres, OTR/L  Time Calculation: 31 mins

## 2019-05-28 NOTE — PROGRESS NOTES
Problem: Falls - Risk of  Goal: *Absence of Falls  Description  Document Ioana Escalera Fall Risk and appropriate interventions in the flowsheet. Outcome: Progressing Towards Goal     Problem: Patient Education: Go to Patient Education Activity  Goal: Patient/Family Education  Outcome: Progressing Towards Goal     Problem: Pressure Injury - Risk of  Goal: *Prevention of pressure injury  Description  Document Ignacio Scale and appropriate interventions in the flowsheet. Outcome: Progressing Towards Goal     Problem: Patient Education: Go to Patient Education Activity  Goal: Patient/Family Education  Outcome: Progressing Towards Goal     Problem: Cellulitis Care Plan (Adult)  Goal: *Control of acute pain  Outcome: Progressing Towards Goal  Goal: *Skin integrity maintained  Outcome: Progressing Towards Goal  Goal: *Absence of infection signs and symptoms  Outcome: Progressing Towards Goal     Problem: Patient Education: Go to Patient Education Activity  Goal: Patient/Family Education  Outcome: Progressing Towards Goal     Problem: Diabetes Self-Management  Goal: *Disease process and treatment process  Description  Define diabetes and identify own type of diabetes; list 3 options for treating diabetes. Outcome: Progressing Towards Goal  Goal: *Incorporating nutritional management into lifestyle  Description  Describe effect of type, amount and timing of food on blood glucose; list 3 methods for planning meals. Outcome: Progressing Towards Goal  Goal: *Incorporating physical activity into lifestyle  Description  State effect of exercise on blood glucose levels. Outcome: Progressing Towards Goal  Goal: *Developing strategies to promote health/change behavior  Description  Define the ABC's of diabetes; identify appropriate screenings, schedule and personal plan for screenings.   Outcome: Progressing Towards Goal  Goal: *Using medications safely  Description  State effect of diabetes medications on diabetes; name diabetes medication taking, action and side effects. Outcome: Progressing Towards Goal  Goal: *Monitoring blood glucose, interpreting and using results  Description  Identify recommended blood glucose targets  and personal targets. Outcome: Progressing Towards Goal  Goal: *Prevention, detection, treatment of acute complications  Description  List symptoms of hyper- and hypoglycemia; describe how to treat low blood sugar and actions for lowering  high blood glucose level. Outcome: Progressing Towards Goal  Goal: *Prevention, detection and treatment of chronic complications  Description  Define the natural course of diabetes and describe the relationship of blood glucose levels to long term complications of diabetes.   Outcome: Progressing Towards Goal  Goal: *Developing strategies to address psychosocial issues  Description  Describe feelings about living with diabetes; identify support needed and support network  Outcome: Progressing Towards Goal     Problem: Patient Education: Go to Patient Education Activity  Goal: Patient/Family Education  Outcome: Progressing Towards Goal     Problem: General Infection Care Plan (Adult and Pediatric)  Goal: Improvement in signs and symptoms of infection  Outcome: Progressing Towards Goal  Goal: *Optimize nutritional status  Outcome: Progressing Towards Goal     Problem: Patient Education: Go to Patient Education Activity  Goal: Patient/Family Education  Outcome: Progressing Towards Goal

## 2019-05-29 ENCOUNTER — APPOINTMENT (OUTPATIENT)
Dept: WOUND CARE | Age: 84
End: 2019-05-29
Attending: PODIATRIST
Payer: MEDICARE

## 2019-05-29 ENCOUNTER — APPOINTMENT (OUTPATIENT)
Dept: CT IMAGING | Age: 84
DRG: 871 | End: 2019-05-29
Attending: HOSPITALIST
Payer: MEDICARE

## 2019-05-29 ENCOUNTER — APPOINTMENT (OUTPATIENT)
Dept: ULTRASOUND IMAGING | Age: 84
DRG: 871 | End: 2019-05-29
Attending: HOSPITALIST
Payer: MEDICARE

## 2019-05-29 LAB
ALBUMIN SERPL-MCNC: 1.8 G/DL (ref 3.4–5)
ALBUMIN/GLOB SERPL: 0.6 {RATIO} (ref 0.8–1.7)
ALP SERPL-CCNC: 80 U/L (ref 45–117)
ALT SERPL-CCNC: 23 U/L (ref 13–56)
ANION GAP SERPL CALC-SCNC: 10 MMOL/L (ref 3–18)
AST SERPL-CCNC: 44 U/L (ref 15–37)
BACTERIA SPEC CULT: ABNORMAL
BASOPHILS # BLD: 0 K/UL (ref 0–0.1)
BASOPHILS NFR BLD: 0 % (ref 0–2)
BILIRUB SERPL-MCNC: 2.1 MG/DL (ref 0.2–1)
BUN SERPL-MCNC: 60 MG/DL (ref 7–18)
BUN/CREAT SERPL: 54 (ref 12–20)
CALCIUM SERPL-MCNC: 8.1 MG/DL (ref 8.5–10.1)
CHLORIDE SERPL-SCNC: 110 MMOL/L (ref 100–108)
CO2 SERPL-SCNC: 24 MMOL/L (ref 21–32)
CREAT SERPL-MCNC: 1.12 MG/DL (ref 0.6–1.3)
DIFFERENTIAL METHOD BLD: ABNORMAL
EOSINOPHIL # BLD: 0 K/UL (ref 0–0.4)
EOSINOPHIL NFR BLD: 0 % (ref 0–5)
ERYTHROCYTE [DISTWIDTH] IN BLOOD BY AUTOMATED COUNT: 18.5 % (ref 11.6–14.5)
GLOBULIN SER CALC-MCNC: 3.1 G/DL (ref 2–4)
GLUCOSE BLD STRIP.AUTO-MCNC: 157 MG/DL (ref 70–110)
GLUCOSE BLD STRIP.AUTO-MCNC: 165 MG/DL (ref 70–110)
GLUCOSE BLD STRIP.AUTO-MCNC: 179 MG/DL (ref 70–110)
GLUCOSE BLD STRIP.AUTO-MCNC: 208 MG/DL (ref 70–110)
GLUCOSE SERPL-MCNC: 161 MG/DL (ref 74–99)
GRAM STN SPEC: ABNORMAL
HCT VFR BLD AUTO: 31.2 % (ref 35–45)
HGB BLD-MCNC: 10.2 G/DL (ref 12–16)
LYMPHOCYTES # BLD: 0.5 K/UL (ref 0.9–3.6)
LYMPHOCYTES NFR BLD: 5 % (ref 21–52)
MCH RBC QN AUTO: 28.3 PG (ref 24–34)
MCHC RBC AUTO-ENTMCNC: 32.7 G/DL (ref 31–37)
MCV RBC AUTO: 86.4 FL (ref 74–97)
MONOCYTES # BLD: 0.4 K/UL (ref 0.05–1.2)
MONOCYTES NFR BLD: 5 % (ref 3–10)
NEUTS SEG # BLD: 8.2 K/UL (ref 1.8–8)
NEUTS SEG NFR BLD: 90 % (ref 40–73)
PLATELET # BLD AUTO: 67 K/UL (ref 135–420)
POTASSIUM SERPL-SCNC: 4.1 MMOL/L (ref 3.5–5.5)
PROT SERPL-MCNC: 4.9 G/DL (ref 6.4–8.2)
RBC # BLD AUTO: 3.61 M/UL (ref 4.2–5.3)
SERVICE CMNT-IMP: ABNORMAL
SODIUM SERPL-SCNC: 144 MMOL/L (ref 136–145)
WBC # BLD AUTO: 9.1 K/UL (ref 4.6–13.2)

## 2019-05-29 PROCEDURE — 97530 THERAPEUTIC ACTIVITIES: CPT

## 2019-05-29 PROCEDURE — 80053 COMPREHEN METABOLIC PANEL: CPT

## 2019-05-29 PROCEDURE — 73701 CT LOWER EXTREMITY W/DYE: CPT

## 2019-05-29 PROCEDURE — 74011250637 HC RX REV CODE- 250/637: Performed by: HOSPITALIST

## 2019-05-29 PROCEDURE — 36415 COLL VENOUS BLD VENIPUNCTURE: CPT

## 2019-05-29 PROCEDURE — 85025 COMPLETE CBC W/AUTO DIFF WBC: CPT

## 2019-05-29 PROCEDURE — 65660000000 HC RM CCU STEPDOWN

## 2019-05-29 PROCEDURE — 74011000258 HC RX REV CODE- 258: Performed by: HOSPITALIST

## 2019-05-29 PROCEDURE — 97163 PT EVAL HIGH COMPLEX 45 MIN: CPT

## 2019-05-29 PROCEDURE — 74011250637 HC RX REV CODE- 250/637: Performed by: INTERNAL MEDICINE

## 2019-05-29 PROCEDURE — 74011636637 HC RX REV CODE- 636/637: Performed by: HOSPITALIST

## 2019-05-29 PROCEDURE — 82962 GLUCOSE BLOOD TEST: CPT

## 2019-05-29 PROCEDURE — 76705 ECHO EXAM OF ABDOMEN: CPT

## 2019-05-29 PROCEDURE — 74011250636 HC RX REV CODE- 250/636: Performed by: HOSPITALIST

## 2019-05-29 PROCEDURE — 74011636320 HC RX REV CODE- 636/320: Performed by: HOSPITALIST

## 2019-05-29 RX ORDER — INSULIN GLARGINE 100 [IU]/ML
7 INJECTION, SOLUTION SUBCUTANEOUS DAILY
Status: DISCONTINUED | OUTPATIENT
Start: 2019-05-29 | End: 2019-05-31 | Stop reason: HOSPADM

## 2019-05-29 RX ADMIN — IOPAMIDOL 100 ML: 612 INJECTION, SOLUTION INTRAVENOUS at 10:39

## 2019-05-29 RX ADMIN — CARVEDILOL 12.5 MG: 12.5 TABLET, FILM COATED ORAL at 21:00

## 2019-05-29 RX ADMIN — INSULIN LISPRO 2 UNITS: 100 INJECTION, SOLUTION INTRAVENOUS; SUBCUTANEOUS at 16:30

## 2019-05-29 RX ADMIN — INSULIN LISPRO 2 UNITS: 100 INJECTION, SOLUTION INTRAVENOUS; SUBCUTANEOUS at 06:38

## 2019-05-29 RX ADMIN — INSULIN GLARGINE 7 UNITS: 100 INJECTION, SOLUTION SUBCUTANEOUS at 09:39

## 2019-05-29 RX ADMIN — LEVOFLOXACIN 750 MG: 500 TABLET, FILM COATED ORAL at 16:30

## 2019-05-29 RX ADMIN — INSULIN LISPRO 2 UNITS: 100 INJECTION, SOLUTION INTRAVENOUS; SUBCUTANEOUS at 11:30

## 2019-05-29 RX ADMIN — CARVEDILOL 12.5 MG: 12.5 TABLET, FILM COATED ORAL at 09:39

## 2019-05-29 RX ADMIN — INSULIN LISPRO 4 UNITS: 100 INJECTION, SOLUTION INTRAVENOUS; SUBCUTANEOUS at 22:00

## 2019-05-29 RX ADMIN — Medication 16 G: at 07:02

## 2019-05-29 RX ADMIN — PIPERACILLIN SODIUM,TAZOBACTAM SODIUM 3.38 G: 3; .375 INJECTION, POWDER, FOR SOLUTION INTRAVENOUS at 04:57

## 2019-05-29 RX ADMIN — BUMETANIDE 0.5 MG: 1 TABLET ORAL at 09:39

## 2019-05-29 RX ADMIN — PIPERACILLIN SODIUM,TAZOBACTAM SODIUM 3.38 G: 3; .375 INJECTION, POWDER, FOR SOLUTION INTRAVENOUS at 09:38

## 2019-05-29 NOTE — PROGRESS NOTES
7611:  Assumed care for patient, received bedside report from Ananda Carrero RN. Patient quietly lying in bed resting with no complaints of pain or discomfort at the time. Whiteboard updated, bed at the lowest position with call bell within reach. 1029:  Patient off floor for CT for right leg.    1105:  Patient back on unit from CT.

## 2019-05-29 NOTE — PROGRESS NOTES
Hospitalist Progress Note    Patient: Radha Mcgowan MRN: [id number]  CSN: [id number]    YOB: 1926  Age: 80 y.o. Sex: female    DOA: 5/25/2019 LOS:  LOS: 4 days          Chief Complaint:    sepsis      Assessment/Plan          81 yo female admitted for AMS, cellulitis of right leg superimposed on chronic ulceration, as well as sepsis.      Sepsis - secondary to bacteremia, cellulitis, improving. repeat cx are neg, but will need ID consult on IV abx recommendations     Metabolic encephalopathy-certainly may be due to severe infection still getting better-may not clear until after she leaves hospital     Cellulitis - right leg-much improved-but due to severity of infection, will get CT of leg to rule out deep abscess     Elevated bili and dilated CBD on CT scan-US without acute abnormality, repeat bili is down to 2     Bacteremia -repeat cultures thus far negative-on 5/26 one of two still positive-e coli, pseudomonas, morganella, and klebsiella on the admission cultures    Echo showed no clear valavular vegetation     Chronic diastolic CHF - compensated. Continue bumex     Chronic A-fib - rate controlled on coreg. Not on anticoagulation.      NIDDM - with hyperglycemia-continue on SSI.  A1C less than 7%-BG in 150-180 range, add low dose basal insulin     Thrombocytopenia - off lovenox, may be due to sepsis, continue to monitor daily CBC and hold any blood thinners-may be acute on chronic as plt count low in 80K range as far back as 2011     DNR code status    Consult to ID  Discussed with daughter and reviewed all questions      Disposition :SNF 2-3 days  Patient Active Problem List   Diagnosis Code    Ulcer of right pretibial region, with fat layer exposed ([address] L97.812    CHF (congestive heart failure) ([address] I50.9    Chronic atrial fibrillation (HCC) I48.2    Diabetes mellitus type 2, diet-controlled ([address] E92.0    Diastolic CHF, acute on chronic (HCC) I50.33    History of stroke Z86.73  Cellulitis L03.90    Bandemia D72.825    Sepsis ([facility]) A41.9    Bacteremia R78.81    Thrombocytopenia (HCC) D69.6    Thrombocytopenia, secondary D69.59       Subjective:  I am so confused, where am I? Why has my daughter not come by? Denies pain, nausea  Is feeding self breakfast this am  No nursing concerns      Review of systems:    Constitutional: denies fevers, chills  Respiratory: denies SOB  Cardiovascular: denies chest pain  Gastrointestinal: denies nausea, vomiting, diarrhea      Vital signs/Intake and Output:  Visit Vitals  /71   Pulse (!) 106   Temp 97.4 °F (36.3 °C)   Resp 20   Ht 5' 6\" (1.676 m)   Wt 85.7 kg (189 lb)   SpO2 100%   Breastfeeding? No   BMI 30.51 kg/m²     Current Shift:  No intake/output data recorded.   Last three shifts:  05/27 1901 - 05/29 0700  In: 360 [P.O.:360]  Out: 0     Exam:    General: elderly debilitated AAF, NAD, ox1  CVS:irreg rhythm, reg rate, no M/R/G, S1/S2 heard, no thrill  Lungs:Clear to auscultation bilaterally, no wheezes, rhonchi, or rales  Abdomen: Soft, Nontender, No distention, Normal Bowel sounds, No hepatomegaly  Extremities: No C/C/E, pulses palpable 2+  Skin:redness right thigh resolved, LE dressed  Neuro:grossly normal , follows commands  Psych:appropriate, but confused to place and time                Labs: Results:       Chemistry Recent Labs     05/29/19 0342 05/28/19 0344 05/27/19 0131   * 131* 158*    146* 142   K 4.1 3.6 3.7   * 111* 107   CO2 24 24 24   BUN 60* 57* 52*   CREA 1.12 1.14 1.32*   CA 8.1* 8.2* 8.1*   AGAP 10 11 11   BUCR 54* 50* 39*   AP 80  --   --    TP 4.9*  --   --    ALB 1.8*  --   --    GLOB 3.1  --   --    AGRAT 0.6*  --   --       CBC w/Diff Recent Labs     05/29/19 0342 05/28/19 0344 05/27/19  0131   WBC 9.1 13.4* 12.5   RBC 3.61* 3.55* 3.40*   HGB 10.2* 10.2* 9.9*   HCT 31.2* 30.5* 29.7*   PLT 67* 61* 77*   GRANS 90*  --   --    LYMPH 5*  --   --    EOS 0  --   --       Cardiac Enzymes Recent Labs     05/26/19  1731 05/26/19  1420    103   CKND1 2.1 1.9      Coagulation No results for input(s): PTP, INR, APTT in the last 72 hours. No lab exists for component: INREXT    Lipid Panel Lab Results   Component Value Date/Time    Cholesterol, total 140 04/06/2011 03:50 AM    HDL Cholesterol 64 (H) 04/06/2011 03:50 AM    LDL, calculated 70.4 04/06/2011 03:50 AM    VLDL, calculated 5.6 04/06/2011 03:50 AM    Triglyceride 28 04/06/2011 03:50 AM    CHOL/HDL Ratio 2.2 04/06/2011 03:50 AM      BNP No results for input(s): BNPP in the last 72 hours.    Liver Enzymes Recent Labs     05/29/19  0342   TP 4.9*   ALB 1.8*   AP 80   SGOT 44*      Thyroid Studies Lab Results   Component Value Date/Time    TSH 0.78 04/06/2011 03:50 AM        Procedures/imaging: see electronic medical records for all procedures/Xrays and details which were not copied into this note but were reviewed prior to creation of Terrell Han MD

## 2019-05-29 NOTE — CONSULTS
Infectious Disease Consultation Note    Date of Admission: 5/25/2019    Date of Consultation: 5/29/2019    Referred by: Dr. Leena Castro       Reason for Referral: polymicrobial bacteremia       Current Antimicrobials: Prior Antimicrobials   Levofloxacin 5/29 - 0 Vancomycin 5/25 - 4, Zosyn     Assessment / Plan:     Polymicrobial Bacteremia  - blcx 5/25 Pseudomonas mendocina x 2, Morganella x 1, E coli, Klebsiella x 1 (all sensitive to zosyn, ceftriaxone, quinolones)  - rpbt blcx 5/26 Ps mendocina 1 of 2  - likely source infected leg wounds  - has received 4 days of effective IV abx rx -> dc Zosyn. Dc Vancomycin  -> Levofloxacin 750 mg q 48 hours x 5 doses   Right thigh cellulitis  - present on admission  - now resolved -> monitor   Chronic leg wounds  - started as bullae -> per wd care   DM     HTN    Chronic A fib    CHF    h/o CVA      Microbiology:   5/25  blcx Ps mendocina, Morganella x 1, Ps mendocina, E coli, Klebsiella  5/26 blcx Ps 1 of 2  5/28 blcx NGTD x 2    Lines / Catheters:   piv    HPI:     80year-old AA female with DM, HTN, Chronic A fib, CHF, h/o CVA admitted to [facility] 5/25/2019 due to confusion. She has chronic leg ulcers which have been followed at the [place] [address] since April when they appear to have been mainly bullae. Serial photographs at the wound clinic show enlarging areas of denuded skin on legs likely representing ruptured or unroofed bullae. On the days of admission she was taken to the ED by family who found her confused, initially thinking it may  Have been due to her hydrocodone. At the ED she was noted to have cellulitis extending proximally to the medial right thigh and groin. Temperature was 99.8 and WBC was 4.5 but there was 30% bandemia. She was admitted on Vancomycin and Ceftriaxone which was changed to Zosyn on 5/26. Blood culutres from 5/25 have grown Pseudomonas mendocina and Morganella in one set and Pseudomonas mendocina, E col, and Klebsiella pneumonia in another. Repeat blood cultures 5/26 grew Pseudomonas mendocina in one of two sets. Blood cultures form 5/28 are no growth to date x 2 sets. She has been afebrile and WBC mack to 13.4 but has normalized again today (9.1). The right thigh cellulitis appears to have resolved. Active Hospital Problems    Diagnosis Date Noted    Thrombocytopenia, secondary 05/28/2019    Bacteremia 05/26/2019    Thrombocytopenia ([address] 05/26/2019    Cellulitis 05/25/2019    Bandemia 05/25/2019    Sepsis ([address] 05/25/2019    History of stroke 51/63/6871    Diastolic CHF, acute on chronic ([facility] 04/15/2019    Diabetes mellitus type 2, diet-controlled ([facility] 04/13/2019    CHF (congestive heart failure) ([facility] 04/12/2019    Chronic atrial fibrillation ([facility] 04/12/2019     Past Medical History:   Diagnosis Date    A-fib ([facility]     Breast cancer ([facility] 2014    Chronic pain     Diabetes ([facility]     Hypertension     Menopause      Past Surgical History:   Procedure Laterality Date    HX BREAST BIOPSY      Left br.   Benign    HX HYSTERECTOMY      Age 52     Family History   Problem Relation Age of Onset    Breast Cancer Maternal Aunt     Breast Cancer Maternal Grandmother      Social History     Socioeconomic History    Marital status:      Spouse name: Not on file    Number of children: Not on file    Years of education: Not on file    Highest education level: Not on file   Occupational History    Not on file   Social Needs    Financial resource strain: Not on file    Food insecurity:     Worry: Not on file     Inability: Not on file    Transportation needs:     Medical: Not on file     Non-medical: Not on file   Tobacco Use    Smoking status: Never Smoker    Smokeless tobacco: Never Used   Substance and Sexual Activity    Alcohol use: Not Currently    Drug use: Not Currently    Sexual activity: Not on file   Lifestyle    Physical activity:     Days per week: Not on file     Minutes per session: Not on file    Stress: Not on file   Relationships    Social connections:     Talks on phone: Not on file     Gets together: Not on file     Attends [employer] service: Not on file     Active member of club or organization: Not on file     Attends meetings of clubs or organizations: Not on file     Relationship status: Not on file    Intimate partner violence:     Fear of current or ex partner: Not on file     Emotionally abused: Not on file     Physically abused: Not on file     Forced sexual activity: Not on file   Other Topics Concern     Service Not Asked    Blood Transfusions Not Asked    Caffeine Concern Not Asked    Occupational Exposure Not Asked   [place] Not Asked    Sleep Concern Not Asked    Stress Concern Not Asked    Weight Concern Not Asked    Special Diet Not Asked    Back Care Not Asked    Exercise Not Asked    Bike Helmet Not Asked   [address] Not Asked    Self-Exams Not Asked   Social History Narrative    Not on file       Allergies:    Lisinopril; Norco [hydrocodone-acetaminophen]; Sulfa (sulfonamide antibiotics); and Petrolatum,white . Medications:     Current Facility-Administered Medications   Medication Dose Route Frequency    insulin glargine (LANTUS) injection 7 Units  7 Units SubCUTAneous DAILY    bumetanide (BUMEX) tablet 0.5 mg  0.5 mg Oral DAILY    carvedilol (COREG) tablet 12.5 mg  12.5 mg Oral BID    piperacillin-tazobactam (ZOSYN) 3.375 g in 0.9% sodium chloride (MBP/ADV) 100 mL MBP  3.375 g IntraVENous Q6H    sodium chloride (NS) flush 5-10 mL  5-10 mL IntraVENous PRN    insulin lispro (HUMALOG) injection   SubCUTAneous AC&HS    glucose chewable tablet 16 g  4 Tab Oral PRN    glucagon (GLUCAGEN) injection 1 mg  1 mg IntraMUSCular PRN    dextrose (D50W) injection syrg 12.5-25 g  25-50 mL IntraVENous PRN       ROS:   A comprehensive review of systems was negative except for that written in the History of Present Illness.     Physical Exam:     Temp (24hrs), Av.7 °F (36.5 °C), Min:97.4 °F (36.3 °C), Max:98.3 °F (36.8 °C)    Visit Vitals  /61   Pulse 74   Temp 97.5 °F (36.4 °C)   Resp 20   Ht 5' 6\" (1.676 m)   Wt 85.7 kg (189 lb)   LMP  (LMP Unknown)   SpO2 97%   Breastfeeding? No   BMI 30.51 kg/m²       General: Well developed, well nourished 80 y.o.  female in no acute distress. ENT: ENT exam normal, no neck nodes or sinus tenderness  Head: normocephalic, without obvious abnormality  Mouth:  mucous membranes moist, pharynx normal without lesions  Neck: supple, symmetrical, trachea midline   Cardio:  irregularly irregular rhythm  Chest: inspection normal - no chest wall deformities or tenderness, respiratory effort normal  Lungs: clear to auscultation and no wheezes or rales  Abdomen: soft, non-tender. Bowel sounds normal. No masses, no organomegaly. Extremities:  Bilateral leg dressings intact, clean and dry. Trace edema lateral and dependent thighs  Neuro: alert, oriented.      Lab results:     Chemistry  Recent Labs     19   * 131* 158*    146* 142   K 4.1 3.6 3.7   * 111* 107   CO2 24 24 24   BUN 60* 57* 52*   CREA 1.12 1.14 1.32*   CA 8.1* 8.2* 8.1*   AGAP 10 11 11   BUCR 54* 50* 39*   AP 80  --   --    TP 4.9*  --   --    ALB 1.8*  --   --    GLOB 3.1  --   --    AGRAT 0.6*  --   --        CBC w/ Diff  Recent Labs     19   WBC 9.1 13.4* 12.5   RBC 3.61* 3.55* 3.40*   HGB 10.2* 10.2* 9.9*   HCT 31.2* 30.5* 29.7*   PLT 67* 61* 77*   GRANS 90*  --   --    LYMPH 5*  --   --    EOS 0  --   --        Microbiology  All Micro Results     Procedure Component Value Units Date/Time    CULTURE, BLOOD [[id number]]  (Abnormal)  (Susceptibility) Collected:  19    Order Status:  Completed Specimen:  Blood Updated:  19     Special Requests: NO SPECIAL REQUESTS        GRAM STAIN       GRAM NEGATIVE RODS ANAEROBIC BOTTLE SMEAR CALLED TO AND CORRECTLY REPEATED BY: Connie Prabhakar RN 3N, TO JAF AT 5989 5/26/2019                  GRAM NEGATIVE RODS AEROBIC BOTTLE                  SMEAR CALLED TO AND CORRECTLY REPEATED BY: Connie Prabhakar RN 3N, TO JAF AT 0402 5/26/2019           Culture result:       AEROBIC BOTTLE PSEUDOMONAS MENDOCINA For Susceptibility Refer to Culture [id number]                  ANAEROBIC BOTTLE ESCHERICHIA COLI                  ANAEROBIC BOTTLE KLEBSIELLA PNEUMONIAE          CULTURE, BLOOD [[id number]] Collected:  05/28/19 1036    Order Status:  Completed Specimen:  Blood Updated:  05/29/19 0719     Special Requests:  RIGHT ARM     Culture result: NO GROWTH AFTER 19 HOURS       CULTURE, BLOOD [[id number]] Collected:  05/28/19 1042    Order Status:  Completed Specimen:  Blood Updated:  05/29/19 0719     Special Requests:  RIGHT     Culture result: NO GROWTH AFTER 19 HOURS       CULTURE, BLOOD [[id number]] Collected:  05/26/19 1014    Order Status:  Completed Specimen:  Blood Updated:  05/29/19 0719     Special Requests: NO SPECIAL REQUESTS        Culture result: NO GROWTH 3 DAYS       CULTURE, BLOOD [[id number]]  (Abnormal) Collected:  05/26/19 1016    Order Status:  Completed Specimen:  Blood Updated:  05/28/19 0702     Special Requests: NO SPECIAL REQUESTS        GRAM STAIN       AEROBIC BOTTLE GRAM NEGATIVE RODS                  SMEAR CALLED TO AND CORRECTLY REPEATED BY: [facility] Maxine LÓPEZ RN 3N ON 5/27/2019 0833 TO 5046           Culture result:       AEROBIC BOTTLE PSEUDOMONAS MENDOCINA For Susceptibility Refer to Culture [id number]          CULTURE, BLOOD [[id number]]  (Abnormal)  (Susceptibility) Collected:  05/25/19 2035    Order Status:  Completed Specimen:  Blood Updated:  05/28/19 0658     Special Requests: NO SPECIAL REQUESTS        GRAM STAIN       GRAM NEGATIVE RODS AEROBIC BOTTLE                  SMEAR CALLED TO AND CORRECTLY REPEATED BY: Connie Prabhakar RN 3N, TO JAF AT 1018 5/26/2019                  GRAM NEGATIVE RODS ANAEROBIC BOTTLE                  SMEAR CALLED TO AND CORRECTLY REPEATED BY: German Friedman RN 3N, TO JAF AT 1442 5/26/2019           Culture result:       [address], [place], [address] [facility]  Infectious Disease Specialist  Pager [phone]

## 2019-05-29 NOTE — PROGRESS NOTES
Transition of care: snf when medically cleared  Met with patient at bedside. Patient sleeping. Cm has acceptance from [facility] at [address] [place], [place]. And cm has also reached out to [facility] and they were waiting PT notes they are in EMR and informed Perez at [facility] since that is the first choice. However, [facility] aware [facility]. Per  Dr. Frederic Andujar patient will need levofloxacin 750 mg Q 48 x5 doses. Cm will continue to follow  Care Management Interventions  PCP Verified by CM:  Yes  Mode of Transport at Discharge: BLS  Transition of Care Consult (CM Consult): SNF  Partner SNF: Yes  Physical Therapy Consult: Yes  Occupational Therapy Consult: Yes  Current Support Network: Assisted Living  Confirm Follow Up Transport: Family  Plan discussed with Pt/Family/Caregiver: Yes  Freedom of Choice Offered: Yes  Discharge Location  Discharge Placement: Skilled nursing facility

## 2019-05-29 NOTE — PROGRESS NOTES
1920: Assumed patient care from Diana Higgins. Patient is alert and oriented to person, place, time and situation. Respiratory status is stable on room air. Vital signs are stable. MEWS score is a one. Patient denies any pain, discomfort, nausea vomiting dizziness or anxiety. White board and fall card is updated. Bed is locked and in lowest position. Call bell, water and personal belongings are within reach. Patient has no questions, comments or concerns after bedside shift report. 0700: Patient had an uneventful shift. Respiratory status, vital signs and MEWS score remained stable. Patient was resting quietly with no signs of distress noted. Bed locked and in lowest position. Call bell water and personal belongings were within reach. Patient had no questions, comments or concerns after bedside shift report.  Bedside report given to Skylar Parker R.N.

## 2019-05-29 NOTE — PROGRESS NOTES
1950 - Bedside and Verbal shift change report given to Amarilys Palmer RN and Venkata Banerjee RN (oncoming nurse) by Wyatt Vaca RN (offgoing nurse). Report included the following information SBAR, Kardex, ED Summary, MAR, Recent Results and Cardiac Rhythm Afib .

## 2019-05-29 NOTE — PROGRESS NOTES
Problem: Mobility Impaired (Adult and Pediatric)  Goal: *Acute Goals and Plan of Care (Insert Text)  Description  Physical TherapyGoals   Initiated 5/29/2019 to be met within 7 days  1. Bed mobility: Rolling L/R and Supine <> Sit with mod/max A with use of HR for positioning. 2. Transfer: Sit <> stand with mod/max A with RW for prep for ambulation. 3. Activity Tolerance: Tolerate EOB sitting 10 minutes for ADL/balance activities. 4. Ambulation:  Ambulate 5-20 ft. mod/max A with RW for increased mobility. Outcome: Progressing Towards Goal  PHYSICAL THERAPY EVALUATION    Patient: Jennie Le (16 y.o. female)  Date: 5/29/2019  Primary Diagnosis: Sepsis ([facility] [place] [address] [A41.9]  Bandemia [D72.825]  Cellulitis [L03.90]  Precautions: Fall    ASSESSMENT :  Based on the objective data described below, the patient seen on telemetry unit and presents with Bilat LE's dressed and ace wrapped, BLE weakness, limitations in functional mobility with regard to bed mobility/transfers, gait and decreased activity tolerance. Pt with O2 sat on RA in mid to high 90's and with HR variable from 76 to 140's (h/o of a-fib). Pt alert, confused and appears intermittently anxious while perseverating on not knowing what she is supposed to be doing and that \"things having \" in the environment. Reports no pain. Required max/total assist for all functional mobility including rolling and supine <>sit. Noted wheezing during supine <>sit but resolved once in new position. Able to tolerate sitting EOB ~5 min before increased fatigue noted. Pt left back with HOB elevated, all needs in reach, and bed alarm engaged. Recommend SNF for follow up physical therapy upon discharge to reach maximal level of independence/safety with functional mobility.      Pt Education: Role of physical therapy in acute care setting, fall prevention and safety/technique during functional mobility tasks      Patient will benefit from skilled intervention to address the above impairments. Patient?s rehabilitation potential is considered to be Fair  Factors which may influence rehabilitation potential include:   ? None noted  ? Mental ability/status  ? Medical condition  ? Home/family situation and support systems  ? Safety awareness  ? Pain tolerance/management  ? Other:      PLAN :  Recommendations and Planned Interventions:  ?           Bed Mobility Training             ? Neuromuscular Re-Education  ? Transfer Training                   ? Orthotic/Prosthetic Training  ? Gait Training                          ? Modalities  ? Therapeutic Exercises          ? Edema Management/Control  ? Therapeutic Activities            ? Patient and Family Training/Education  ? Other (comment):    Frequency/Duration: Patient will be followed by physical therapy 1-2 times per day to address goals. Discharge Recommendations: [facility]  Further Equipment Recommendations for Discharge: N/A     SUBJECTIVE:   Patient stated ? I don't know what I'm supposed to do. Something is going on, but nobody will tell me.?    OBJECTIVE DATA SUMMARY:     Past Medical History:   Diagnosis Date    A-fib ([facility] [address]     Breast cancer ([facility] [address] 2014    Chronic pain     Diabetes ([facility] [address]     Hypertension     Menopause      Past Surgical History:   Procedure Laterality Date    HX BREAST BIOPSY      Left br.   Benign    HX HYSTERECTOMY      Age 52     Barriers to Learning/Limitations: yes;  physical and altered mental status (i.e.Sedation, Confusion)  Compensate with: Visual Cues, Verbal Cues and Tactile Cues  Prior Level of Function/Home Situation: pt reports having trouble moving legs for a while  Home Situation  Home Environment: Independent living([facility])  One/Two Story Residence: One story  Living Alone: Yes  Patient Expects to be Discharged to[de-identified] Unknown  Current DME Used/Available at Home: Walker, rolling  Tub or Shower Type: Shower  Critical Behavior:  Neurologic State: Alert;Confused  Orientation Level: Oriented to person;Oriented to place  Cognition: Follows commands;Decreased attention/concentration  Safety/Judgement: Lack of insight into deficits  Psychosocial  Patient Behaviors: Confused;Calm  Purposeful Interaction: Yes  Pt Identified Daily Priority: Clinical issues (comment)  Felis Process: Nurture loving kindness;Establish trust;Teaching/learning; Attend basic human needs;Create healing environment  Caring Interventions: Reassure; Therapeutic modalities  Reassure: Therapeutic listening; Informing; Acceptance;Caring rounds  Therapeutic Modalities: Humor; Intentional therapeutic touch  Skin Condition/Temp: Warm  Skin Integrity: Wound (add Wound LDA)  Skin Integumentary  Skin Color: Appropriate for ethnicity  Skin Condition/Temp: Warm  Skin Integrity: Wound (add Wound LDA)  Turgor: Epidermis thin w/ loss of subcut tissue  Hair Growth: Present  Varicosities: Absent  Strength:    Strength: Generally decreased, functional  Tone & Sensation:   Tone: Normal  Sensation: Intact  Range Of Motion:  AROM: Generally decreased, functional  PROM: Generally decreased, functional  Functional Mobility:  Bed Mobility:  Rolling: Maximum assistance  Supine to Sit: Total assistance  Sit to Supine: Total assistance  Balance:   Sitting: Impaired  Sitting - Static: Fair (occasional)  Sitting - Dynamic: Fair (occasional)  Pain:  Pain Scale 1: Numeric (0 - 10)  Pain Intensity 1: 0  Activity Tolerance:   Fair   Please refer to the flowsheet for vital signs taken during this treatment. After treatment:   ?         Patient left in no apparent distress sitting up in chair  ? Patient left in no apparent distress in bed  ? Call bell left within reach  ? Nursing notified  ? Caregiver present  ?          Bed alarm activated    COMMUNICATION/EDUCATION:   ?         Fall prevention education was provided and the patient/caregiver indicated understanding. ? Patient/family have participated as able in goal setting and plan of care. ?         Patient/family agree to work toward stated goals and plan of care. ?         Patient understands intent and goals of therapy, but is neutral about his/her participation. ? Patient is unable to participate in goal setting and plan of care.     Eval Complexity: History: HIGH Complexity :3+ comorbidities / personal factors will impact the outcome/ POC Exam:HIGH Complexity : 4+ Standardized tests and measures addressing body structure, function, activity limitation and / or participation in recreation  Presentation: MEDIUM Complexity : Evolving with changing characteristics  Clinical Decision Making:High Complexity requiring max/total assist for functional mobility  Overall Complexity:HIGH    Thank you for this referral.  Blade Kirk, PT   Time Calculation: 24 mins

## 2019-05-30 LAB
ALBUMIN SERPL-MCNC: 1.8 G/DL (ref 3.4–5)
ALBUMIN/GLOB SERPL: 0.5 {RATIO} (ref 0.8–1.7)
ALP SERPL-CCNC: 98 U/L (ref 45–117)
ALT SERPL-CCNC: 26 U/L (ref 13–56)
ANION GAP SERPL CALC-SCNC: 6 MMOL/L (ref 3–18)
AST SERPL-CCNC: 40 U/L (ref 15–37)
BASOPHILS # BLD: 0 K/UL (ref 0–0.1)
BASOPHILS NFR BLD: 0 % (ref 0–2)
BILIRUB SERPL-MCNC: 2.1 MG/DL (ref 0.2–1)
BUN SERPL-MCNC: 55 MG/DL (ref 7–18)
BUN/CREAT SERPL: 50 (ref 12–20)
CALCIUM SERPL-MCNC: 8 MG/DL (ref 8.5–10.1)
CHLORIDE SERPL-SCNC: 110 MMOL/L (ref 100–108)
CO2 SERPL-SCNC: 31 MMOL/L (ref 21–32)
CREAT SERPL-MCNC: 1.1 MG/DL (ref 0.6–1.3)
DIFFERENTIAL METHOD BLD: ABNORMAL
EOSINOPHIL # BLD: 0.1 K/UL (ref 0–0.4)
EOSINOPHIL NFR BLD: 1 % (ref 0–5)
ERYTHROCYTE [DISTWIDTH] IN BLOOD BY AUTOMATED COUNT: 18.7 % (ref 11.6–14.5)
GLOBULIN SER CALC-MCNC: 3.3 G/DL (ref 2–4)
GLUCOSE BLD STRIP.AUTO-MCNC: 107 MG/DL (ref 70–110)
GLUCOSE BLD STRIP.AUTO-MCNC: 178 MG/DL (ref 70–110)
GLUCOSE BLD STRIP.AUTO-MCNC: 190 MG/DL (ref 70–110)
GLUCOSE BLD STRIP.AUTO-MCNC: 60 MG/DL (ref 70–110)
GLUCOSE BLD STRIP.AUTO-MCNC: 65 MG/DL (ref 70–110)
GLUCOSE BLD STRIP.AUTO-MCNC: 71 MG/DL (ref 70–110)
GLUCOSE BLD STRIP.AUTO-MCNC: 73 MG/DL (ref 70–110)
GLUCOSE BLD STRIP.AUTO-MCNC: 93 MG/DL (ref 70–110)
GLUCOSE SERPL-MCNC: 118 MG/DL (ref 74–99)
HCT VFR BLD AUTO: 31.1 % (ref 35–45)
HGB BLD-MCNC: 10.2 G/DL (ref 12–16)
LYMPHOCYTES # BLD: 0.4 K/UL (ref 0.9–3.6)
LYMPHOCYTES NFR BLD: 7 % (ref 21–52)
MCH RBC QN AUTO: 28.9 PG (ref 24–34)
MCHC RBC AUTO-ENTMCNC: 32.8 G/DL (ref 31–37)
MCV RBC AUTO: 88.1 FL (ref 74–97)
MONOCYTES # BLD: 0.4 K/UL (ref 0.05–1.2)
MONOCYTES NFR BLD: 7 % (ref 3–10)
NEUTS SEG # BLD: 5.4 K/UL (ref 1.8–8)
NEUTS SEG NFR BLD: 85 % (ref 40–73)
PLATELET # BLD AUTO: 72 K/UL (ref 135–420)
PLATELET COMMENTS,PCOM: ABNORMAL
PMV BLD AUTO: 11.9 FL (ref 9.2–11.8)
POTASSIUM SERPL-SCNC: 3.8 MMOL/L (ref 3.5–5.5)
PROT SERPL-MCNC: 5.1 G/DL (ref 6.4–8.2)
RBC # BLD AUTO: 3.53 M/UL (ref 4.2–5.3)
RBC MORPH BLD: ABNORMAL
SODIUM SERPL-SCNC: 147 MMOL/L (ref 136–145)
WBC # BLD AUTO: 6.3 K/UL (ref 4.6–13.2)

## 2019-05-30 PROCEDURE — 65660000000 HC RM CCU STEPDOWN

## 2019-05-30 PROCEDURE — 97530 THERAPEUTIC ACTIVITIES: CPT

## 2019-05-30 PROCEDURE — 85025 COMPLETE CBC W/AUTO DIFF WBC: CPT

## 2019-05-30 PROCEDURE — 74011636637 HC RX REV CODE- 636/637: Performed by: HOSPITALIST

## 2019-05-30 PROCEDURE — 36415 COLL VENOUS BLD VENIPUNCTURE: CPT

## 2019-05-30 PROCEDURE — 97110 THERAPEUTIC EXERCISES: CPT

## 2019-05-30 PROCEDURE — 74011250637 HC RX REV CODE- 250/637: Performed by: HOSPITALIST

## 2019-05-30 PROCEDURE — 82962 GLUCOSE BLOOD TEST: CPT

## 2019-05-30 PROCEDURE — 80053 COMPREHEN METABOLIC PANEL: CPT

## 2019-05-30 RX ORDER — HEPARIN 100 UNIT/ML
300 SYRINGE INTRAVENOUS ONCE
Status: DISCONTINUED | OUTPATIENT
Start: 2019-05-30 | End: 2019-05-30

## 2019-05-30 RX ADMIN — INSULIN LISPRO 2 UNITS: 100 INJECTION, SOLUTION INTRAVENOUS; SUBCUTANEOUS at 11:53

## 2019-05-30 RX ADMIN — INSULIN LISPRO 2 UNITS: 100 INJECTION, SOLUTION INTRAVENOUS; SUBCUTANEOUS at 17:28

## 2019-05-30 RX ADMIN — BUMETANIDE 0.5 MG: 1 TABLET ORAL at 10:05

## 2019-05-30 RX ADMIN — CARVEDILOL 12.5 MG: 12.5 TABLET, FILM COATED ORAL at 22:28

## 2019-05-30 RX ADMIN — INSULIN GLARGINE 7 UNITS: 100 INJECTION, SOLUTION SUBCUTANEOUS at 10:12

## 2019-05-30 RX ADMIN — CARVEDILOL 12.5 MG: 12.5 TABLET, FILM COATED ORAL at 10:05

## 2019-05-30 NOTE — PROGRESS NOTES
Transition of care[de-identified] anticipate rehab when medically cleared for d/c  Patients daughter Sal Ernandez has asked cm to send ptient to facilities in [facility]. Informed her that [facility] was her first choice states she wants to try to get patient to a closer rehab to her home. Informed her that cm will send out to facilities as requsted, However; if patient is ready for d/c and does ot have an acceptig facility in new locations she has requested her choice at that time would be to let patient go to [facility] or [place] or she could also consider taking patient home. Daughter is very aware of this. Cm will continue to follow. Patient has medicre for insuracne and has Dr. Orly Lawrence for pcp  Care Management Interventions  PCP Verified by CM:  Yes  Mode of Transport at Discharge: BLS  Transition of Care Consult (CM Consult): SNF  Partner SNF: Yes  Physical Therapy Consult: Yes  Occupational Therapy Consult: Yes  Current Support Network: Assisted Living  Confirm Follow Up Transport: Family  Plan discussed with Pt/Family/Caregiver: Yes  Freedom of Choice Offered: Yes  Discharge Location  Discharge Placement: Skilled nursing facility

## 2019-05-30 NOTE — PROGRESS NOTES
1950: Bedside and Verbal shift change report given to ENRIQUE Ambrocio, JT and Anthony Miguel Rn  (oncoming nurse) by Michael Zee RN (offgoing nurse). Report included the following information SBAR, Kardex, MAR, Recent Results and Cardiac Rhythm Afib.   2000: Patient assessed. Family in room. Patient is oriented to self, on room air and lung sounds are clear. She is calm and resting. Purewick put in place. 0000: Patient reassessed. The patient is sleeping and there are no new changes to report at this time. 0200: Patient sleeping, no changes to report. 0400: Reassessment complete, patient resting comfortably, no changes to notate at this time. 0710: Bedside and Verbal shift change report given to Rebecca Laird RN (oncoming nurse) by Bhaskar Gonzalez RN (offgoing nurse). Report included the following information SBAR, Kardex, MAR, Recent Results and Cardiac Rhythm Afib.

## 2019-05-30 NOTE — DIABETES MGMT
GLYCEMIC CONTROL PROGRESS NOTE:    - known h/o T2DM, HbA1C not within recommended range for age + comorbids no PTA DM meds  - BG out of target range non-ICU: < 180 mg/dl  - TDD = 17 (7 Lantus  + 10 units - Humalog Normal Insulin Sensitivity Corrective Coverage)  Noted:  - pt with hypoglycemic episode on AM POCT 60 mg/dl, possibly r/t poor PO intake, hypoglycemia protocol followed  - recommend d/c basal insulin, PPG out of target range, recommend conservative MTI & d/c HS corrective coverage   *Humalog 2 units qa    Recent Glucose Results:   Lab Results   Component Value Date/Time    GLUCPOC 93 05/30/2019 07:24 AM    GLUCPOC 73 05/30/2019 06:50 AM    GLUCPOC 71 05/30/2019 06:37 AM     Cherelle Lam MS, RN, CDE  Glycemic Control Team  [phone]  Pager [phone] (M-TH 8:00-4:30P)  *After Hours pager [phone]

## 2019-05-30 NOTE — WOUND CARE
Wound Care Progress Note       Follow-up visit for bilateral lower leg wounds. Assessment:   Communication: A&O x 4, communicative  Incontinent. Requires partial assists in repositioning. Diet: As ordered by MD  Patient reports pain upon pressure or repositioning of legs. Generalized edema and blanching erythema to lower legs and feet. 1. POA, right lower leg VSU = multiple scattered open areas, base is 90% of red/pink tissue. Moderate amount of serosanginous/tan exudate. Periwound macerated & with erythema. Margins are open. Treatment: Proshield, Cutimed, Aquacel, ABD, Kerlix, ACE wraps  2. POA, left lower leg VSU = 9.5 x 6 x 0.1 cm    Base is 90% of pink/red tissue. Moderate amount of serosanginous exudate. Periwound intact & with some erythema. Margins are open. Treatment: Proshield, Cutimed, Aquacel, ABD, Kerlix, ACE wraps     Wound Recommendations:    Proshield to periwound, Cutimed and Aquacel to wound bed, ABD, Kerlix, ACE wraps to be changed twice weekly and PRN soiled/saturated. Skin Care & Pressure Relief Recommendations:  Minimize layers of linen/pads under patient to optimize support surface. Turn/reposition approximately every 2 hours and offload heels pillows or Prevalon boots.   Manage incontinence / promote continence; Proshield to buttocks and sacrum daily and as needed with incontinence care    Discussed above plan with patient & Hesham Yoon MD    Transition of Care: Plan to follow weekly and per product recommendations while admitted to hospital.

## 2019-05-30 NOTE — PROGRESS NOTES
NUTRITION FOLLOW-UP    RECOMMENDATIONS/PLAN:   -Commercial beverage: glucerna BID  Monitor labs/lytes, PO intakes, skin integrity, wt, fluid status, BM    NUTRITION ASSESSMENT:   Client Update: 80 yrs old Female with c/o AMS, found to have cellulitis of right leg on chronic ulceration, sepsis, metabolic encephalopathy, elevated bili and dilated CBD on CT, bacteremia, Echo showed no clear valavular vegetation, chronic diastolic CHF, chronic a-fib, NIDDM, thrombocytopenia. FOOD/NUTRITION INTAKE   Diet Order:  DM 2000    Food Allergies: NKFA  Average PO Intake:      Patient Vitals for the past 100 hrs:   % Diet Eaten   05/29/19 1843 25 %   05/29/19 1400 10 %   05/28/19 1920 0 %   05/28/19 1647 0 %   05/28/19 1200 0 %   05/28/19 1037 15 %   05/27/19 1920 20 %      Pertinent Medications:  [x] Reviewed; Electrolyte Replacement Protocol: []K []Mg []PO4  Insulin:  []SSI  [x]Pre-meal   [x]Basal    []Drip  []None  Cultural/Confucianist Food Preferences: None Identified    BIOCHEMICAL DATA & MEDICAL TESTS  Pertinent Labs:  [x] Reviewed; glucose-161 AST-44 GFR est AA-55   ANTHROPOMETRICS  Height: 5' 6\" (167.6 cm)       Weight: 84.2 kg (185 lb 9.4 oz)         BMI: 30 kg/m^2 obese (30%-39.9% BMI)   Adm Weight: 183 lbs                Weight change: +2lbs  Adjusted Body Weight: 66.5kg    NUTRITION-FOCUSED PHYSICAL ASSESSMENT  Skin: cellulitis to R leg, no pressure areas per documentation     GI: +BM 5/28/19    NUTRITION PRESCRIPTION  Calories: 1662 kcal/day based on 25kcal/kg adj body weight  Protein: 80 g/day based on 1.2 g/kg Adj Body weight  CHO: 208 g/day based on 50% of total energy  Fluid: 1662 ml/day based on 1 kcal/ml          NUTRITION DIAGNOSES:   1. At risk for unintended weight loss related to reduced appetite as evidenced by pt report in [facility]     NUTRITION INTERVENTIONS:   INTERVENTIONS:                                                                                         GOALS:  1.  Commercial beverage: glucerna BID 1. Glucerna intakes >75% throughout the next 5 days              LEARNING NEEDS (Diet, Supplementation, Food/Nutrient-Drug Interaction):   [] None Identified   [] Education provided/documented      Identified and patient: [] Declined   [x] Was not appropriate/indicated        NUTRITION MONITORING /EVALUATION:   Follow PO intake  Monitor wt  Monitor renal labs, electrolytes, fluid status     Previous Recommendations Implemented: yes        Previous Goals Met:  N/A -      [] Participated in Interdisciplinary Rounds    [x] Interdisciplinary Care Plan Reviewed  DISCHARGE NUTRITION RECOMMENDATIONS ADDRESSED:      [x] To be determined closer to discharge    NUTRITION RISK:           [x] At risk                        [] Not currently at risk        Will follow-up per policy.   Katherine Barber 1

## 2019-05-30 NOTE — PROGRESS NOTES
Problem: Falls - Risk of  Goal: *Absence of Falls  Description  Document Herlinda Lawrence Fall Risk and appropriate interventions in the flowsheet.   Outcome: Progressing Towards Goal

## 2019-05-30 NOTE — ROUTINE PROCESS
7:45 PM  Bedside and Verbal shift change report given to Adrián RN (oncoming nurse) by Hugh Loya RN (offgoing nurse). Report included the following information SBAR, Kardex and MAR.

## 2019-05-30 NOTE — PROGRESS NOTES
1140  Bedside and Verbal shift change report given to Margarito Ruby RN by April Joshua. Report included the following information SBAR, Kardex, OR Summary, Intake/Output and MAR.     1950  Bedside and Verbal shift change report given to ANDREY Perdomo RN by Margarito Ruby RN. Report included the following information SBAR, Kardex, OR Summary, Intake/Output and MAR.

## 2019-05-30 NOTE — PROGRESS NOTES
Problem: Mobility Impaired (Adult and Pediatric)  Goal: *Acute Goals and Plan of Care (Insert Text)  Description  Physical TherapyGoals   Initiated 5/29/2019 to be met within 7 days  1. Bed mobility: Rolling L/R and Supine <> Sit with mod/max A with use of HR for positioning. 2. Transfer: Sit <> stand with mod/max A with RW for prep for ambulation. 3. Activity Tolerance: Tolerate EOB sitting 10 minutes for ADL/balance activities. 4. Ambulation:  Ambulate 5-20 ft. mod/max A with RW for increased mobility. Outcome: Progressing Towards Goal   PHYSICAL THERAPY TREATMENT    Patient: Naa Shahid (76 y.o. female)  Date: 5/30/2019  Diagnosis: Sepsis ([facility] [address] [A41.9]  Bandemia [D72.825]  Cellulitis [L03.90] Sepsis ([facility] [address]       Precautions: Fall   Chart, physical therapy assessment, plan of care and goals were reviewed. ASSESSMENT:  Pt is mildly argumentative, but agree to PT session. High levels of assistance were needed for EOB transition and 3 standing trials. Strong posterior lean, that pt is unable to resolve, due to pulling of walker and mild PF contractures. Progression toward goals:  ?      Improving appropriately and progressing toward goals  ? Improving slowly and progressing toward goals  ? Not making progress toward goals and plan of care will be adjusted     PLAN:  Patient continues to benefit from skilled intervention to address the above impairments. Continue treatment per established plan of care. Discharge Recommendations:  [facility] (Medical transport)  Further Equipment Recommendations for Discharge:  bedside commode and rolling walker     SUBJECTIVE:   Patient stated I'm do.     OBJECTIVE DATA SUMMARY:   Critical Behavior:  Neurologic State: Alert, Confused, Eyes open spontaneously  Orientation Level: Oriented to person  Cognition: Follows commands  Safety/Judgement: Lack of insight into deficits  Functional Mobility Training:  Bed Mobility:  Rolling:  Total assistance  Supine to Sit: Total assistance  Sit to Supine: Total assistance  Transfers:  Sit to Stand: Total assistance  Stand to Sit: Maximum assistance; Total assistance   Balance:  Sitting: Impaired  Sitting - Static: Fair (occasional)  Sitting - Dynamic: Poor (constant support)  Standing: Impaired; With support  Standing - Static: Poor  Standing - Dynamic : Poor  Ambulation/Gait Training:  Unable  Therapeutic Exercises:       EXERCISE   Sets   Reps   Active Active Assist   Passive Self ROM   Comments   Ankle Pumps 1 20  [x] [] [] [] B Ankle stretch   Quad Sets/Glut Sets 1 10 [x] [] [] []    Hamstring Sets   [] [] [] []    Short Arc Quads 1 10 [x] [] [] []    Heel Slides 1 10 [x] [] [] []    Straight Leg Raises   [] [] [] []    Hip Abd/Add 1 10 [x] [] [] []    Long Arc Quads 1 10 [x] [] [] []    Seated Marching   [] [] [] []    Standing Marching   [] [] [] []       [] [] [] []        Pain:  Pain Scale 1: Numeric (0 - 10)  Pain Intensity 1: 0   Pain out: 0  Activity Tolerance:   Fair-  Please refer to the flowsheet for vital signs taken during this treatment. After treatment:   ? Patient left in no apparent distress sitting up in chair  ? Patient left in no apparent distress in bed  ? Call bell left within reach  ? Nursing notified  ? Caregiver present  ?  Bed alarm activated      Cleveland Delatorre PTA   Time Calculation: 65 mins

## 2019-05-30 NOTE — PROGRESS NOTES
Infectious Disease Follow-up Note      Date of Admission: 5/25/2019     Date of Note:  5/30/2019      Summary:     79 y/o AAF w/ DM, HTN, Chronic AF, CHF, h/o CVA w/ chronic leg wounds admitted with confusion, bandemia, had polymicrobial bacteremia w/ Ps mendocina, Morganella, E coli, Klebsiella. Suspected infected leg wounds. CKD    Interval History:     Sitting up in bed feeding self lunch. Says appetite is good. D/w family at bedside.  Legs now in ACE bandage ?unna boot    Current Antimicrobials: Prior Antimicrobials   Levofloxacin 5/29 - 1 Vancomycin 5/25 - 4, Zosyn      Assessment / Plan:      Polymicrobial Bacteremia  - blcx 5/25 Pseudomonas mendocina x 2, Morganella x 1, E coli, Klebsiella x 1 (all sensitive to zosyn, ceftriaxone, quinolones)  - rpbt blcx 5/26 Ps mendocina 1 of 2  - likely source infected leg wounds  - has received 4 days of effective IV abx rx -> continue Levofloxacin 750 mg q 48 hours x 5 doses  -> no objection to discharge from ID standpoint   Right thigh cellulitis  - present on admission  - now resolved -> monitor   Chronic leg wounds  - started as bullae -> per wd care   DM      HTN     Chronic A fib     CHF     h/o CVA        Microbiology:   5/25      blcx Ps mendocina, Morganella x 1, Ps mendocina, E coli, Klebsiella  5/26      blcx Ps 1 of 2  5/28      blcx NGTD x 2     Lines / Catheters:   piv      Patient Active Problem List   Diagnosis Code    Ulcer of right pretibial region, with fat layer exposed ([facility] L97.812    CHF (congestive heart failure) (HCC) I50.9    Chronic atrial fibrillation (HCC) I48.2    Diabetes mellitus type 2, diet-controlled ([facility]) U29.2    Diastolic CHF, acute on chronic (HCC) I50.33    History of stroke Z86.73    Cellulitis L03.90    Bandemia D72.825    Sepsis ([facility]) A41.9    Bacteremia R78.81    Thrombocytopenia ([facility]) D69.6    Thrombocytopenia, secondary D69.59       Current Facility-Administered Medications   Medication Dose Route Frequency    insulin glargine (LANTUS) injection 7 Units  7 Units SubCUTAneous DAILY    levoFLOXacin (LEVAQUIN) tablet 750 mg  750 mg Oral Q48H    bumetanide (BUMEX) tablet 0.5 mg  0.5 mg Oral DAILY    carvedilol (COREG) tablet 12.5 mg  12.5 mg Oral BID    sodium chloride (NS) flush 5-10 mL  5-10 mL IntraVENous PRN    insulin lispro (HUMALOG) injection   SubCUTAneous AC&HS    glucose chewable tablet 16 g  4 Tab Oral PRN    glucagon (GLUCAGEN) injection 1 mg  1 mg IntraMUSCular PRN    dextrose (D50W) injection syrg 12.5-25 g  25-50 mL IntraVENous PRN               Objective:     Visit Vitals  /70   Pulse 92   Temp 97.6 °F (36.4 °C)   Resp 16   Ht 5' 6\" (1.676 m)   Wt 84.2 kg (185 lb 9.4 oz)   LMP  (LMP Unknown)   SpO2 98%   Breastfeeding? No   BMI 29.96 kg/m²       Temp (24hrs), Av.7 °F (36.5 °C), Min:97.4 °F (36.3 °C), Max:97.9 °F (36.6 °C)      General: Well developed, well nourished 80 y.o.  female in no acute distress. ENT: ENT exam normal, no neck nodes or sinus tenderness  Head: normocephalic, without obvious abnormality  Mouth:  mucous membranes moist, pharynx normal without lesions  Neck: supple, symmetrical, trachea midline   Cardio:  irregularly irregular rhythm  Chest: inspection normal - no chest wall deformities or tenderness, respiratory effort normal  Lungs: clear to auscultation and no wheezes or rales  Abdomen: soft, non-tender. Bowel sounds normal. No masses, no organomegaly.   Extremities:  Bilateral leg ACE wrap to thighs        Lab results     Chemistry  Recent Labs     19  0907 19  0342 19  0344   * 161* 131*   * 144 146*   K 3.8 4.1 3.6   * 110* 111*   CO2 31 24 24   BUN 55* 60* 57*   CREA 1.10 1.12 1.14   CA 8.0* 8.1* 8.2*   AGAP 6 10 11   BUCR 50* 54* 50*   AP 98 80  --    TP 5.1* 4.9*  --    ALB 1.8* 1.8*  --    GLOB 3.3 3.1  --    AGRAT 0.5* 0.6*  --        CBC w/ Diff  Recent Labs     19  0907 19  03419  0344 WBC 6.3 9.1 13.4*   RBC 3.53* 3.61* 3.55*   HGB 10.2* 10.2* 10.2*   HCT 31.1* 31.2* 30.5*   PLT 72* 67* 61*   GRANS 85* 90*  --    LYMPH 7* 5*  --    EOS 1 0  --        Microbiology  All Micro Results     Procedure Component Value Units Date/Time    CULTURE, BLOOD [[id number]] Collected:  05/28/19 1042    Order Status:  Completed Specimen:  Blood Updated:  05/30/19 0617     Special Requests:  RIGHT     Culture result: NO GROWTH 2 DAYS       CULTURE, BLOOD [[id number]] Collected:  05/28/19 1036    Order Status:  Completed Specimen:  Blood Updated:  05/30/19 0617     Special Requests:  RIGHT ARM     Culture result: NO GROWTH 2 DAYS       CULTURE, BLOOD [[id number]] Collected:  05/26/19 1014    Order Status:  Completed Specimen:  Blood Updated:  05/30/19 0617     Special Requests: NO SPECIAL REQUESTS        Culture result: NO GROWTH 4 DAYS       CULTURE, BLOOD [[id number]]  (Abnormal)  (Susceptibility) Collected:  05/25/19 2055    Order Status:  Completed Specimen:  Blood Updated:  05/29/19 0740     Special Requests: NO SPECIAL REQUESTS        GRAM STAIN       GRAM NEGATIVE RODS ANAEROBIC BOTTLE                  SMEAR CALLED TO AND CORRECTLY REPEATED BY: Dania Novoa RN 3N, TO [facility] AT 1406 5/26/2019                  GRAM NEGATIVE RODS AEROBIC BOTTLE                  SMEAR CALLED TO AND CORRECTLY REPEATED BY: Dania Novoa RN 3N, TO [facility] AT 0551 5/26/2019           Culture result:       AEROBIC BOTTLE PSEUDOMONAS MENDOCINA For Susceptibility Refer to Culture [id number]                  ANAEROBIC BOTTLE ESCHERICHIA COLI                  ANAEROBIC BOTTLE KLEBSIELLA PNEUMONIAE          CULTURE, BLOOD [[id number]]  (Abnormal) Collected:  05/26/19 1016    Order Status:  Completed Specimen:  Blood Updated:  05/28/19 0702     Special Requests: NO SPECIAL REQUESTS        GRAM STAIN       AEROBIC BOTTLE GRAM NEGATIVE RODS                  SMEAR CALLED TO AND CORRECTLY REPEATED BY: [facility] Maxine LÓPEZ RN 3N ON 5/27/2019 0860 TO 1584 Culture result:       AEROBIC BOTTLE PSEUDOMONAS MENDOCINA For Susceptibility Refer to Culture [id number]          CULTURE, BLOOD [[id number]]  (Abnormal)  (Susceptibility) Collected:  05/25/19 2035    Order Status:  Completed Specimen:  Blood Updated:  05/28/19 0658     Special Requests: NO SPECIAL REQUESTS        GRAM STAIN       GRAM NEGATIVE RODS AEROBIC BOTTLE                  SMEAR CALLED TO AND CORRECTLY REPEATED BY: Vanessa Pham RN 3N, TO DIN[facility] AT 1012 5/26/2019                  GRAM NEGATIVE RODS ANAEROBIC BOTTLE                  SMEAR CALLED TO AND CORRECTLY REPEATED BY: Vanessa Pham RN 3N, TO JA AT [phone] 5/26/2019           Culture result:       [facility]  Rocío Anderson MD, [facility]  Infectious Disease Specialist  Pager [phone]

## 2019-05-30 NOTE — PROGRESS NOTES
0754  Pt is awake, alert, oriented to person and birthdate. Resting in bed. Ace wrap dressing to both legs dry and intact. 10:17 AM   In bed. Took po meds with orange juice. Fed by CNA at breakfast. Lungs are clear. Abdomen soft with active BS. Pt has Purewick external urinary device draining  Ivanna urine. Seen by wound care . Dressing to both legs was done by Wound care nurse. Wounds appears pinkish. No drainage noted. Xeroform, ABD, kerlix and ace were applied to both legs by Wound care Nurse. 1100  IDR done with Dr Nena Quiroga. Plan for pt is  for transfer to Rehab/ SNF tomorrow. 3:29 PM   Pt resting in bed. Pt denies pain and pt is comfortable. Family member at bedside. 5:30 PM  Pt served dinner.

## 2019-05-30 NOTE — PROGRESS NOTES
Hospitalist Progress Note    Patient: Jose D Reeder MRN: [id number]  CSN: [id number]    YOB: 1926  Age: 80 y.o. Sex: female    DOA: 5/25/2019 LOS:  LOS: 5 days          Chief Complaint:      sepsis    Assessment/Plan     79 yo female admitted for AMS, cellulitis of right leg superimposed on chronic ulceration, as well as sepsis.      Sepsis - secondary to bacteremia, cellulitis, improving. repeat cx are neg, ID has made abx recs-change to PO     Metabolic encephalopathy-improving slowly-certainly may be due to severe infection still getting better-may not clear until after she leaves hospital     Cellulitis - right leg-much improved     Elevated bili and dilated CBD on CT scan-US without acute abnormality-repeat bili still at 2.1     Bacteremia -repeat cultures thus far negative-on 5/26 one of two still positive-e coli, pseudomonas, morganella, and klebsiella on the admission cultures     Echo showed no clear valvular vegetation     Chronic diastolic CHF - Continue bumex     Chronic A-fib - rate controlled on coreg.  Not on anticoagulation.      NIDDM - with hyperglycemia-continue on SSI.     Thrombocytopenia -  may be due to sepsis, continue to monitor daily CBC and hold any blood thinners-may be acute on chronic as plt count low in 80K range as far back as 2011-slightly better this am     DNR code status    Wound care to see her, continue PO abx  expect she can d/c if all is continuing to improve tomorrow to SNF      Disposition :  Patient Active Problem List   Diagnosis Code    Ulcer of right pretibial region, with fat layer exposed ([address] L97.812    CHF (congestive heart failure) (HCC) I50.9    Chronic atrial fibrillation ([address] I48.2    Diabetes mellitus type 2, diet-controlled ([address] I07.0    Diastolic CHF, acute on chronic (HCC) I50.33    History of stroke Z86.73    Cellulitis L03.90    Bandemia D72.825    Sepsis ([address] A41.9    Bacteremia R78.81    Thrombocytopenia ([address] D69.6  Thrombocytopenia, secondary D69.59       Subjective:    She is awake and talking  BG low per daughter  No new issues  She is eating oatmeal    Review of systems:    Constitutional: denies fevers, chills  Respiratory: denies SOB  Cardiovascular: denies chest pain  Gastrointestinal: denies nausea, vomiting, diarrhea      Vital signs/Intake and Output:  Visit Vitals  /85   Pulse (!) 118   Temp 97.7 °F (36.5 °C)   Resp 16   Ht 5' 6\" (1.676 m)   Wt 84.2 kg (185 lb 9.4 oz)   SpO2 100%   Breastfeeding? No   BMI 29.96 kg/m²     Current Shift:  No intake/output data recorded. Last three shifts:  05/28 1901 - 05/30 0700  In: 620 [P.O.:620]  Out: 200 [Urine:200]    Exam:    General: elderly frail AAF, NAD, ox2  Head/Neck: NCAT, supple, No masses, No lymphadenopathy  CVS:Regular rate and rhythm, no M/R/G, S1/S2 heard, no thrill  Lungs:Clear to auscultation bilaterally, no wheezes, rhonchi, or rales  Abdomen: Soft, Nontender, No distention, Normal Bowel sounds, No hepatomegaly  Extremities: cellulitis right thigh resolved, leg wraps in place  Neuro:grossly normal , follows commands  Psych:appropriate                Labs: Results:       Chemistry Recent Labs     05/30/19  0907 05/29/19 0342 05/28/19  0344   * 161* 131*   * 144 146*   K 3.8 4.1 3.6   * 110* 111*   CO2 31 24 24   BUN 55* 60* 57*   CREA 1.10 1.12 1.14   CA 8.0* 8.1* 8.2*   AGAP 6 10 11   BUCR 50* 54* 50*   AP 98 80  --    TP 5.1* 4.9*  --    ALB 1.8* 1.8*  --    GLOB 3.3 3.1  --    AGRAT 0.5* 0.6*  --       CBC w/Diff Recent Labs     05/30/19  0907 05/29/19 0342 05/28/19  0344   WBC 6.3 9.1 13.4*   RBC 3.53* 3.61* 3.55*   HGB 10.2* 10.2* 10.2*   HCT 31.1* 31.2* 30.5*   PLT 72* 67* 61*   GRANS 85* 90*  --    LYMPH 7* 5*  --    EOS 1 0  --       Cardiac Enzymes No results for input(s): CPK, CKND1, JEREMY in the last 72 hours.     No lab exists for component: CKRMB, TROIP   Coagulation No results for input(s): PTP, INR, APTT in the last 72 hours.    No lab exists for component: INREXT, INREXT    Lipid Panel Lab Results   Component Value Date/Time    Cholesterol, total 140 04/06/2011 03:50 AM    HDL Cholesterol 64 (H) 04/06/2011 03:50 AM    LDL, calculated 70.4 04/06/2011 03:50 AM    VLDL, calculated 5.6 04/06/2011 03:50 AM    Triglyceride 28 04/06/2011 03:50 AM    CHOL/HDL Ratio 2.2 04/06/2011 03:50 AM      BNP No results for input(s): BNPP in the last 72 hours.    Liver Enzymes Recent Labs     05/30/19  0907   TP 5.1*   ALB 1.8*   AP 98   SGOT 40*      Thyroid Studies Lab Results   Component Value Date/Time    TSH 0.78 04/06/2011 03:50 AM        Procedures/imaging: see electronic medical records for all procedures/Xrays and details which were not copied into this note but were reviewed prior to creation of Marissa Lebron MD

## 2019-05-31 VITALS
DIASTOLIC BLOOD PRESSURE: 84 MMHG | RESPIRATION RATE: 20 BRPM | SYSTOLIC BLOOD PRESSURE: 135 MMHG | TEMPERATURE: 97.4 F | HEIGHT: 66 IN | OXYGEN SATURATION: 100 % | BODY MASS INDEX: 30.12 KG/M2 | HEART RATE: 104 BPM | WEIGHT: 187.39 LBS

## 2019-05-31 LAB
GLUCOSE BLD STRIP.AUTO-MCNC: 106 MG/DL (ref 70–110)
GLUCOSE BLD STRIP.AUTO-MCNC: 151 MG/DL (ref 70–110)

## 2019-05-31 PROCEDURE — 74011636637 HC RX REV CODE- 636/637: Performed by: HOSPITALIST

## 2019-05-31 PROCEDURE — 74011250637 HC RX REV CODE- 250/637: Performed by: HOSPITALIST

## 2019-05-31 PROCEDURE — 74011250637 HC RX REV CODE- 250/637: Performed by: INTERNAL MEDICINE

## 2019-05-31 PROCEDURE — 82962 GLUCOSE BLOOD TEST: CPT

## 2019-05-31 RX ORDER — INSULIN GLARGINE 100 [IU]/ML
7 INJECTION, SOLUTION SUBCUTANEOUS
Qty: 1 VIAL | Refills: 0 | Status: SHIPPED
Start: 2019-05-31

## 2019-05-31 RX ORDER — LEVOFLOXACIN 750 MG/1
750 TABLET ORAL
Qty: 3 TAB | Refills: 0 | Status: SHIPPED
Start: 2019-05-31 | End: 2019-06-05

## 2019-05-31 RX ADMIN — BUMETANIDE 0.5 MG: 1 TABLET ORAL at 09:00

## 2019-05-31 RX ADMIN — INSULIN GLARGINE 7 UNITS: 100 INJECTION, SOLUTION SUBCUTANEOUS at 09:00

## 2019-05-31 RX ADMIN — LEVOFLOXACIN 750 MG: 500 TABLET, FILM COATED ORAL at 12:55

## 2019-05-31 RX ADMIN — CARVEDILOL 12.5 MG: 12.5 TABLET, FILM COATED ORAL at 09:00

## 2019-05-31 NOTE — DISCHARGE SUMMARY
[address]    Name:  Nori Emery  MR#:   [id number]  :  1926  ACCOUNT #:  [de-identified]  ADMIT DATE:  2019  DISCHARGE DATE:  2019      The patient has a Do Not Resuscitate code status. Her current diet is diabetic with options of 2000 kilocalorie. DISCHARGE DIAGNOSES:  1. Bacteremia. 2.  Right lower extremity cellulitis resulting in bacteremia. 3.  Chronic venous stasis ulcerations of the lower extremities. 4.  Chronic peripheral edema due to congestive heart failure. 5.  Chronic diastolic congestive heart failure. 6.  Metabolic encephalopathy. 7.  Non-insulin-dependent diabetes mellitus. HOSPITAL SUMMARY:  The patient is a kendra 22-year-old  female who came to the hospital on 2019. She was confused which was the primary complaint when she came in, but she is under care for chronic lower extremity ulcerations and lymphedema, and the ER staff noted significant redness spreading up the right thigh to the groin. This was evident that it was cellulitis and most likely the result of infected leg wounds and the etiology of her confusion. Thus she was admitted for this issue, started on IV antibiotics, diagnosed with sepsis, had to receive minimal fluid resuscitation due to her extensive history of fluid issues and an elevated BNP. She was not in septic shock. The patient did well with her admission. She has responded to the IV antibiotics. However, her blood cultures did come back positive for various organisms, thus we consulted with Infectious Disease while she was here, Dr. Mimi Mcmahan, who recommended discontinuing the Zosyn and vancomycin and changing over to Levaquin every 48 hours for five doses, and this was on 2019.   She had Pseudomonas mendocina x2, Morganella, E-coli, and Klebsiella in the blood cultures, likely the source is the infected leg wounds, and by the time she was switched to Levaquin, she had received 4 days of effective IV antibiotics. With that said, she is doing well. She is going to a skilled nursing facility that her daughter chose for her. There have been no acute issues overnight. Today she is stable. Her most recent white count is 6.3, H and H 10.2 and 31.1. Her platelet count is 88,793. She is chronically thrombocytopenic. Her most recent BUN is 55, creatinine 1.10. Her sodium is 147, potassium 3.8, and her most recent blood cultures from 05/28/2019 are both no growth for 3 days. The legs were wrapped by Wound Care yesterday. Her diabetic management is sliding scale insulin corrective coverage. Her blood sugars are between 93 and 190. Her hemoglobin A1c is 6.4%. She had troponin stable at 0.04-0.05 on admission. Her lactate was elevated but normalized after acute treatment, and the patient is stable for discharge to the nursing facility today. Her blood pressure is 135/84, pulse is , temperature 97.4, respiratory rate 20, SaO2 100% on room air. She is an elderly debilitated  female. Lungs are clear bilaterally. Cardiac exam regular rate and rhythm. Her abdomen is soft and nontender. Lower extremities, chronic stasis edema with acute wraps on currently. PLAN:  Discharge today on the following medications:  Bumex 0.5 mg daily, Coreg 12.5 mg twice daily, Lantus insulin 7 units subcu at night, Levaquin 750 mg every 48 hours for three more doses, and to use lidocaine patches as needed for pain. She appears to have no acute pain issues at this point in time. She is stable for transfer to the skilled nursing facility. She has a Do Not Resuscitate in place. Diet as noted above. She should have Accu-Cheks before meals and at bedtime for monitoring of her blood glucose. Continue PT and OT as tolerated with wound care for her lower extremities also. Follow up with Dr. Colletta Gamble after discharge from rehab.  45 minutes of discharge time.       ANA Andre MD [email or web address]  D:  05/31/2019 9:36  T:  05/31/2019 9:45  JOB #:  [id number]  CC:  Jp Warren MD

## 2019-05-31 NOTE — PROGRESS NOTES
0715: Bedside shift change report received from TAMRA Donahue RN. Report included the following information SBAR, Kardex, Intake/Output, MAR, Recent Results, Med Rec Status and patient not on cardiac monitoring and plan of care. Discharge planner/Discharge nurse to do discharge this am to SNF.  7319: Shift assessment complete. See flowsheet. 1030: Report given to JT Dye. Report included the following information SBAR, Kardex, Intake/Output, MAR, Recent Results, Med Rec Status and no cardiac monitoring and plan of care.

## 2019-05-31 NOTE — PROGRESS NOTES
1900: Assumed care of the patient from Nina Villagomez RN. Patient is resting supine in bed and denies chest pain or SOB. Bed is locked in low position and Call light is within reach. 0720: Shift Uneventful. Bedside and Verbal shift change report given to Zara Cain RN (oncoming nurse) by Elinor Larry RN (offgoing nurse). Report included the following information SBAR, Kardex, MAR, Recent Results and Med Rec Status.

## 2019-05-31 NOTE — ROUTINE PROCESS
Transport arrived 1250,  For transport to SNF. Report given to Medic. Rx given prior to discharge Rachel PO, Accucheck 151, help short acting insulin, patient did not consumed lunch meal prior to transport, however, Lantus 7U given @ 0900. D/C WDL.

## 2019-05-31 NOTE — ROUTINE PROCESS
TRANSFER - OUT REPORT:    Verbal report given to Dia Rushing LPN  [facility] (name) hossein Grant  being transferred to pending room assignment (unit) for routine progression of care       Report consisted of patients Situation, Background, Assessment and   Recommendations(SBAR). Information from the following report(s) SBAR, Kardex, ED Summary, Procedure Summary, Intake/Output, MAR, Recent Results, Med Rec Status and Quality Measures was reviewed with the receiving nurse. Lines:   Peripheral IV 05/29/19 Right Hand (Active)   Site Assessment Clean, dry, & intact 5/31/2019  7:43 AM   Phlebitis Assessment 0 5/31/2019  7:43 AM   Infiltration Assessment 0 5/31/2019  7:43 AM   Dressing Status Clean, dry, & intact 5/31/2019  7:43 AM   Dressing Type Transparent;Tape 5/31/2019  7:43 AM   Hub Color/Line Status Blue;Capped 5/31/2019  7:43 AM   Action Taken Open ports on tubing capped 5/31/2019  7:43 AM   Alcohol Cap Used Yes 5/31/2019  7:43 AM        Opportunity for questions and clarification was provided.       Patient transported with:   Tech/Transport Team, pending arrival @ 1300

## 2019-05-31 NOTE — PROGRESS NOTES
Transition of  Care: SNF today  Telephone calll with daughter she would like for her to go to [facility] in [place]. Cm has contacted [facility] and [address], [address] Phone [phone]  She states she will call cm back to confirm she is able to accept today. 101 message sent to [facility] as requested continue to wait on confirmation that they are able to accommodate. Patient daughter plans for rehab and to return home after rehab. Patient has medicare for insurance and has pcp. Patient will need transportation to snf per daughter. RN please call report to [phone] prior to patient leaving [facility]  Please include all hard scripts for controlled substances, med rec and dc summary in packet. Please medicate for pain prior to dc if possible and needed to help offset delay when patient first arrives to facility. 1217 left message for Cynthia at [facility] [phone] to let cm if she is able to accommodate since daughter wants patient to go to that facility    1249 Telephone call with Angela Kennedy at [facility] she is able to accomodate today. Daughter at bedside she is aware and d/c plans as above. Will need dc summary sent to [facility]  Care Management Interventions  PCP Verified by CM:  Yes  Mode of Transport at Discharge: BLS  Transition of Care Consult (CM Consult): SNF  Partner SNF: Yes  Physical Therapy Consult: Yes  Occupational Therapy Consult: Yes  Current Support Network: Assisted Living  Confirm Follow Up Transport: Family  Plan discussed with Pt/Family/Caregiver: Yes  Freedom of Choice Offered: Yes  Discharge Location  Discharge Placement: Skilled nursing facility

## 2019-05-31 NOTE — PROGRESS NOTES
Problem: Falls - Risk of  Goal: *Absence of Falls  Description  Document Sheryl Barillas Fall Risk and appropriate interventions in the flowsheet. Outcome: Progressing Towards Goal     Problem: Patient Education: Go to Patient Education Activity  Goal: Patient/Family Education  Outcome: Progressing Towards Goal     Problem: Pressure Injury - Risk of  Goal: *Prevention of pressure injury  Description  Document Ignacio Scale and appropriate interventions in the flowsheet. Outcome: Progressing Towards Goal     Problem: Patient Education: Go to Patient Education Activity  Goal: Patient/Family Education  Outcome: Progressing Towards Goal     Problem: Cellulitis Care Plan (Adult)  Goal: *Control of acute pain  Outcome: Progressing Towards Goal  Goal: *Skin integrity maintained  Outcome: Progressing Towards Goal  Goal: *Absence of infection signs and symptoms  Outcome: Progressing Towards Goal     Problem: Patient Education: Go to Patient Education Activity  Goal: Patient/Family Education  Outcome: Progressing Towards Goal     Problem: Diabetes Self-Management  Goal: *Disease process and treatment process  Description  Define diabetes and identify own type of diabetes; list 3 options for treating diabetes. Outcome: Progressing Towards Goal  Goal: *Incorporating nutritional management into lifestyle  Description  Describe effect of type, amount and timing of food on blood glucose; list 3 methods for planning meals. Outcome: Progressing Towards Goal  Goal: *Incorporating physical activity into lifestyle  Description  State effect of exercise on blood glucose levels. Outcome: Progressing Towards Goal  Goal: *Developing strategies to promote health/change behavior  Description  Define the ABC's of diabetes; identify appropriate screenings, schedule and personal plan for screenings.   Outcome: Progressing Towards Goal  Goal: *Using medications safely  Description  State effect of diabetes medications on diabetes; name diabetes medication taking, action and side effects. Outcome: Progressing Towards Goal  Goal: *Monitoring blood glucose, interpreting and using results  Description  Identify recommended blood glucose targets  and personal targets. Outcome: Progressing Towards Goal  Goal: *Prevention, detection, treatment of acute complications  Description  List symptoms of hyper- and hypoglycemia; describe how to treat low blood sugar and actions for lowering  high blood glucose level. Outcome: Progressing Towards Goal  Goal: *Prevention, detection and treatment of chronic complications  Description  Define the natural course of diabetes and describe the relationship of blood glucose levels to long term complications of diabetes.   Outcome: Progressing Towards Goal  Goal: *Developing strategies to address psychosocial issues  Description  Describe feelings about living with diabetes; identify support needed and support network  Outcome: Progressing Towards Goal     Problem: Patient Education: Go to Patient Education Activity  Goal: Patient/Family Education  Outcome: Progressing Towards Goal     Problem: General Infection Care Plan (Adult and Pediatric)  Goal: Improvement in signs and symptoms of infection  Outcome: Progressing Towards Goal  Goal: *Optimize nutritional status  Outcome: Progressing Towards Goal     Problem: Patient Education: Go to Patient Education Activity  Goal: Patient/Family Education  Outcome: Progressing Towards Goal     Problem: Patient Education: Go to Patient Education Activity  Goal: Patient/Family Education  Outcome: Progressing Towards Goal     Problem: Patient Education: Go to Patient Education Activity  Goal: Patient/Family Education  Outcome: Progressing Towards Goal

## 2019-06-01 LAB
BACTERIA SPEC CULT: NORMAL
SERVICE CMNT-IMP: NORMAL

## 2019-06-01 NOTE — ROUTINE PROCESS
Late Entry 5/31/2019: Bedside and Verbal shift change report given to [facility]. Rod Buchanan  (oncoming nurse) by Wild Arteaga RN (offgoing nurse). Report included the following information SBAR, Kardex, ED Summary, Procedure Summary, Intake/Output, MAR, Recent Results, Med Rec Status and Quality Measures Plan: discharge to SNF today per discussion Dr. Ora Akers. Initial assessment completed, assumed care of patient.

## 2019-06-03 LAB
BACTERIA SPEC CULT: NORMAL
BACTERIA SPEC CULT: NORMAL
SERVICE CMNT-IMP: NORMAL
SERVICE CMNT-IMP: NORMAL

## 2019-06-05 ENCOUNTER — PATIENT OUTREACH (OUTPATIENT)
Dept: CASE MANAGEMENT | Age: 84
End: 2019-06-05

## 2019-06-05 NOTE — PROGRESS NOTES
Community Care Team Documentation for Patient in [facility]     Patient discharged from  [facility] to [facility], on 5/31/19. Hospital Discharge diagnosis:       1. Bacteremia. 2.  Right lower extremity cellulitis resulting in bacteremia. 3.  Chronic venous stasis ulcerations of the lower extremities. 4.  Chronic peripheral edema due to congestive heart failure. 5.  Chronic diastolic congestive heart failure. 6.  Metabolic encephalopathy. 7.  Non-insulin-dependent diabetes mellitus.     SNF Attending Provider:  Dr. Brandon North     Anticipated discharge date from SNF:  2-3 weeks     PCP : Linford Siemens, MD    Nurse Navigator:     [facility] Team rounds completed, updates provided by facility. Brief Summary of Care:    Family meeting today to discuss goals. Patient participating well w/ therapy - PT/OT. Receiving wound care to bilateral leg wounds. DNR. Dispo - home w/ family in 2-3 weeks. RRAT:  High Risk            25       Total Score        3 Has Seen PCP in Last 6 Months (Yes=3, No=0)    4 IP Visits Last 12 Months (1-3=4, 4=9, >4=11)    18 Charlson Comorbidity Score (Age + Comorbid Conditions)        Criteria that do not apply:    . Living with Significant Other. Assisted Living. LTAC. SNF. or   Rehab    Patient Length of Stay (>5 days = 3)    Pt.  Coverage (Medicare=5 , Medicaid, or Self-Pay=4)        Past Medical History:   Diagnosis Date    A-fib ([address]     Breast cancer ([address] 2014    Chronic pain     Diabetes ([address]     Hypertension     Menopause          Active Ambulatory Problems     Diagnosis Date Noted    Ulcer of right pretibial region, with fat layer exposed ([address] 02/01/2019    CHF (congestive heart failure) ([address] 04/12/2019    Chronic atrial fibrillation ([address] 04/12/2019    Diabetes mellitus type 2, diet-controlled ([address] 35/39/7585    Diastolic CHF, acute on chronic ([address] 04/15/2019    History of stroke 04/15/2019    Cellulitis 05/25/2019    Bandemia 05/25/2019    Seps[facility] 05/25/2019    Bacteremia 05/26/2019    Thrombocytopenia ([facility] 05/26/2019    Thrombocytopenia, secondary 05/28/2019     Resolved Ambulatory Problems     Diagnosis Date Noted    Pleural effusion 04/12/2019    Facial droop 04/13/2019    Elevated troponin 04/13/2019     Past Medical History:   Diagnosis Date    A-fib [facility])     Breast cancer [facility] 2014    Chronic pain     Diabetes ([facility]     Hypertension     Menopause

## 2019-06-12 ENCOUNTER — PATIENT OUTREACH (OUTPATIENT)
Dept: CASE MANAGEMENT | Age: 84
End: 2019-06-12

## 2019-06-12 NOTE — PROGRESS NOTES
Community Care Team Documentation for Patient in [facility]     Patient discharged from  [facility] to Nikki Little, on 5/31/19. Hospital Discharge diagnosis:       1. Bacteremia. 2.  Right lower extremity cellulitis resulting in bacteremia. 3.  Chronic venous stasis ulcerations of the lower extremities. 4.  Chronic peripheral edema due to congestive heart failure. 5.  Chronic diastolic congestive heart failure. 6.  Metabolic encephalopathy. 7.  Non-insulin-dependent diabetes mellitus.     SNF Attending Provider:  Dr. Joe Pressley     Anticipated discharge date from SNF:  2-3 weeks     PCP : Hattie Mcdonnell MD    Nurse Navigator:     [facility] Team rounds completed, updates provided by facility. Brief Summary of Care:    Family meeting with Medicaid rep - looking at possibility of patient staying LTC. Patient refusing therapy at times . PT/OT. Receiving wound care to bilateral leg wounds. DNR. Dispo -to be determined pending Meeting w/ Medicaid rep. RRAT:  High Risk            25       Total Score        3 Has Seen PCP in Last 6 Months (Yes=3, No=0)    4 IP Visits Last 12 Months (1-3=4, 4=9, >4=11)    18 Charlson Comorbidity Score (Age + Comorbid Conditions)        Criteria that do not apply:    . Living with Significant Other. Assisted Living. LTAC. SNF. or   Rehab    Patient Length of Stay (>5 days = 3)    Pt.  Coverage (Medicare=5 , Medicaid, or Self-Pay=4)        Past Medical History:   Diagnosis Date    A-fib ([address]     Breast cancer ([address] 2014    Chronic pain     Diabetes ([address]     Hypertension     Menopause          Active Ambulatory Problems     Diagnosis Date Noted    Ulcer of right pretibial region, with fat layer exposed ([address] 02/01/2019    CHF (congestive heart failure) ([address] 04/12/2019    Chronic atrial fibrillation ([address] 04/12/2019    Diabetes mellitus type 2, diet-controlled ([address] 04/13/2019    Diastolic CHF, acute on chronic (HCC) 04/15/2019    History of stroke 04/15/2019    Cellulitis 05/25/2019    Bandemia 05/25/2019    Sepsis ([facility] 05/25/2019    Bacteremia 05/26/2019    Thrombocytopenia ([facility] 05/26/2019    Thrombocytopenia, secondary 05/28/2019     Resolved Ambulatory Problems     Diagnosis Date Noted    Pleural effusion 04/12/2019    Facial droop 04/13/2019    Elevated troponin 04/13/2019     Past Medical History:   Diagnosis Date    A-fib [facility])     Breast cancer ([facility] 2014    Chronic pain     Diabetes ([facility]     Hypertension     Menopause

## 2019-06-20 ENCOUNTER — PATIENT OUTREACH (OUTPATIENT)
Dept: CASE MANAGEMENT | Age: 84
End: 2019-06-20

## 2019-06-20 NOTE — PROGRESS NOTES
Community Care Team Documentation for Patient in [facility]     Patient discharged from  [facility] to Nikki Little, on 5/31/19. Hospital Discharge diagnosis:       1. Bacteremia. 2.  Right lower extremity cellulitis resulting in bacteremia. 3.  Chronic venous stasis ulcerations of the lower extremities. 4.  Chronic peripheral edema due to congestive heart failure. 5.  Chronic diastolic congestive heart failure. 6.  Metabolic encephalopathy. 7.  Non-insulin-dependent diabetes mellitus.     SNF Attending Provider:  Dr. Iraj Schulz     Anticipated discharge date from SNF:  2-3 weeks     PCP : Varun León MD    Nurse Navigator:     [facility] Team rounds completed, updates provided by facility. Brief Summary of Care:    Family meeting with Medicaid rep - Patient will be staying LTC. Patient continues to refuse therapy at times . PT/OT. Receiving wound care to bilateral leg wounds. DNR. Sees Vasc surgery this week. Dispo - LTC , Will follow for BERTHA x 30 days. RRAT:  High Risk            25       Total Score        3 Has Seen PCP in Last 6 Months (Yes=3, No=0)    4 IP Visits Last 12 Months (1-3=4, 4=9, >4=11)    18 Charlson Comorbidity Score (Age + Comorbid Conditions)        Criteria that do not apply:    . Living with Significant Other. Assisted Living. LTAC. SNF. or   Rehab    Patient Length of Stay (>5 days = 3)    Pt.  Coverage (Medicare=5 , Medicaid, or Self-Pay=4)        Past Medical History:   Diagnosis Date    A-fib ([address]     Breast cancer ([address] 2014    Chronic pain     Diabetes ([address]     Hypertension     Menopause          Active Ambulatory Problems     Diagnosis Date Noted    Ulcer of right pretibial region, with fat layer exposed ([address] 02/01/2019    CHF (congestive heart failure) ([address] 04/12/2019    Chronic atrial fibrillation ([address] 04/12/2019    Diabetes mellitus type 2, diet-controlled ([address] 62/77/6495    Di[facility] 04/15/2019    History of stroke 04/15/2019    Cellulitis 05/25/2019    Bandemia 05/25/2019    Sepsis [facility] 05/25/2019    Bacteremia 05/26/2019    Thrombocytopenia ([facility] 05/26/2019    Thrombocytopenia, secondary 05/28/2019     Resolved Ambulatory Problems     Diagnosis Date Noted    Pleural effusion 04/12/2019    Facial droop 04/13/2019    Elevated troponin 04/13/2019     Past Medical History:   Diagnosis Date    A-fib [facility])     [facility] cancer [facility] 2014    Chronic pain     Diabetes ([facility]     Hypertension     Menopause

## 2019-06-24 NOTE — PROGRESS NOTES
Problem: Falls - Risk of  Goal: *Absence of Falls  Description  Document Marlena Musa Fall Risk and appropriate interventions in the flowsheet. Outcome: Progressing Towards Goal     Problem: Patient Education: Go to Patient Education Activity  Goal: Patient/Family Education  Outcome: Progressing Towards Goal     Problem: Pressure Injury - Risk of  Goal: *Prevention of pressure injury  Description  Document Ignacio Scale and appropriate interventions in the flowsheet. Outcome: Progressing Towards Goal     Problem: Patient Education: Go to Patient Education Activity  Goal: Patient/Family Education  Outcome: Progressing Towards Goal     Problem: Cellulitis Care Plan (Adult)  Goal: *Control of acute pain  Outcome: Progressing Towards Goal  Goal: *Skin integrity maintained  Outcome: Progressing Towards Goal  Goal: *Absence of infection signs and symptoms  Outcome: Progressing Towards Goal     Problem: Patient Education: Go to Patient Education Activity  Goal: Patient/Family Education  Outcome: Progressing Towards Goal     Problem: Diabetes Self-Management  Goal: *Disease process and treatment process  Description  Define diabetes and identify own type of diabetes; list 3 options for treating diabetes. Outcome: Progressing Towards Goal  Goal: *Incorporating nutritional management into lifestyle  Description  Describe effect of type, amount and timing of food on blood glucose; list 3 methods for planning meals. Outcome: Progressing Towards Goal  Goal: *Incorporating physical activity into lifestyle  Description  State effect of exercise on blood glucose levels. Outcome: Progressing Towards Goal  Goal: *Developing strategies to promote health/change behavior  Description  Define the ABC's of diabetes; identify appropriate screenings, schedule and personal plan for screenings.   Outcome: Progressing Towards Goal  Goal: *Using medications safely  Description  State effect of diabetes medications on diabetes; name diabetes medication taking, action and side effects. Outcome: Progressing Towards Goal  Goal: *Monitoring blood glucose, interpreting and using results  Description  Identify recommended blood glucose targets  and personal targets. Outcome: Progressing Towards Goal  Goal: *Prevention, detection, treatment of acute complications  Description  List symptoms of hyper- and hypoglycemia; describe how to treat low blood sugar and actions for lowering  high blood glucose level. Outcome: Progressing Towards Goal  Goal: *Prevention, detection and treatment of chronic complications  Description  Define the natural course of diabetes and describe the relationship of blood glucose levels to long term complications of diabetes.   Outcome: Progressing Towards Goal  Goal: *Developing strategies to address psychosocial issues  Description  Describe feelings about living with diabetes; identify support needed and support network  Outcome: Progressing Towards Goal     Problem: Patient Education: Go to Patient Education Activity  Goal: Patient/Family Education  Outcome: Progressing Towards Goal     Problem: General Infection Care Plan (Adult and Pediatric)  Goal: Improvement in signs and symptoms of infection  Outcome: Progressing Towards Goal  Goal: *Optimize nutritional status  Outcome: Progressing Towards Goal     Problem: Patient Education: Go to Patient Education Activity  Goal: Patient/Family Education  Outcome: Progressing Towards Goal 20

## 2019-06-26 ENCOUNTER — PATIENT OUTREACH (OUTPATIENT)
Dept: CASE MANAGEMENT | Age: 84
End: 2019-06-26

## 2019-06-26 NOTE — PROGRESS NOTES
Community Care Team Documentation for Patient in [facility]     Patient discharged from  [facility] to [facility], on 5/31/19. Hospital Discharge diagnosis:       1. Bacteremia. 2.  Right lower extremity cellulitis resulting in bacteremia. 3.  Chronic venous stasis ulcerations of the lower extremities. 4.  Chronic peripheral edema due to congestive heart failure. 5.  Chronic diastolic congestive heart failure. 6.  Metabolic encephalopathy. 7.  Non-insulin-dependent diabetes mellitus.     SNF Attending Provider:  Dr. Dedrick Durant     Anticipated discharge date from SNF:  2-3 weeks     PCP : North Jha MD    Nurse Navigator:     [facility] Team rounds completed, updates provided by facility. Brief Summary of Care:    Family meeting with Medicaid rep - Patient will be staying LTC. Patient continues to refuse therapy at times . PT/OT. Receiving wound care to bilateral leg wounds. DNR. Sees Vasc surgery this week. Dispo - LTC , Will follow for BERTHA x 30 days. RRAT:  High Risk            25       Total Score        3 Has Seen PCP in Last 6 Months (Yes=3, No=0)    4 IP Visits Last 12 Months (1-3=4, 4=9, >4=11)    18 Charlson Comorbidity Score (Age + Comorbid Conditions)        Criteria that do not apply:    . Living with Significant Other. Assisted Living. LTAC. SNF. or   Rehab    Patient Length of Stay (>5 days = 3)    Pt.  Coverage (Medicare=5 , Medicaid, or Self-Pay=4)        Past Medical History:   Diagnosis Date    A-fib ([address]     Breast cancer ([address] 2014    Chronic pain     Diabetes ([address]     Hypertension     Menopause          Active Ambulatory Problems     Diagnosis Date Noted    Ulcer of right pretibial region, with fat layer exposed ([address] 02/01/2019    CHF (congestive heart failure) ([address] 04/12/2019    Chronic atrial fibrillation ([address] 04/12/2019    Diabetes mellitus type 2, diet-controlled ([address] 34/72/3990    Diastolic CHF, acute on chronic ([facility] 04/15/2019    History of stroke 04/15/2019    Cellulitis 05/25/2019    Bandemia 05/25/2019    [facility] 05/25/[facility]    Bacteremia 05/26/2019    Thrombocytopenia ([facility] 05/26/2019    Thrombocytopenia, secondary 05/28/2019     Resolved Ambulatory Problems     Diagnosis Date Noted    Pleural effusion 04/12/2019    Facial droop 04/13/2019    Elevated troponin 04/13/2019     Past Medical History:   Diagnosis Date    A-fib [facility])     Breast cancer ([facility] 2014    Chronic pain     Diabetes [facility]     Hypertension     Menopause
